# Patient Record
Sex: MALE | Race: BLACK OR AFRICAN AMERICAN | NOT HISPANIC OR LATINO | Employment: OTHER | URBAN - METROPOLITAN AREA
[De-identification: names, ages, dates, MRNs, and addresses within clinical notes are randomized per-mention and may not be internally consistent; named-entity substitution may affect disease eponyms.]

---

## 2018-10-16 PROBLEM — Z11.3 ROUTINE SCREENING FOR STI (SEXUALLY TRANSMITTED INFECTION): Status: ACTIVE | Noted: 2018-10-16

## 2018-10-16 PROBLEM — Z00.00 ANNUAL PHYSICAL EXAM: Status: ACTIVE | Noted: 2018-10-16

## 2018-10-16 PROBLEM — D72.819 LEUKOPENIA: Status: ACTIVE | Noted: 2018-10-16

## 2018-10-16 PROBLEM — E78.5 HYPERLIPIDEMIA: Status: ACTIVE | Noted: 2018-10-16

## 2019-01-21 PROBLEM — Z00.00 ANNUAL PHYSICAL EXAM: Status: RESOLVED | Noted: 2018-10-16 | Resolved: 2019-01-21

## 2019-05-23 ENCOUNTER — HOSPITAL ENCOUNTER (INPATIENT)
Facility: HOSPITAL | Age: 34
LOS: 2 days | Discharge: HOME | DRG: 282 | End: 2019-05-25
Attending: EMERGENCY MEDICINE | Admitting: HOSPITALIST
Payer: COMMERCIAL

## 2019-05-23 DIAGNOSIS — E78.5 HYPERLIPIDEMIA, UNSPECIFIED HYPERLIPIDEMIA TYPE: ICD-10-CM

## 2019-05-23 DIAGNOSIS — I21.4 NSTEMI (NON-ST ELEVATED MYOCARDIAL INFARCTION): Primary | ICD-10-CM

## 2019-05-23 DIAGNOSIS — D72.819 LEUKOPENIA, UNSPECIFIED TYPE: ICD-10-CM

## 2019-05-23 DIAGNOSIS — Z11.3 ROUTINE SCREENING FOR STI (SEXUALLY TRANSMITTED INFECTION): ICD-10-CM

## 2019-05-23 DIAGNOSIS — Z91.199 NONCOMPLIANCE: ICD-10-CM

## 2019-05-23 DIAGNOSIS — E78.5 HYPERLIPIDEMIA: ICD-10-CM

## 2019-05-23 DIAGNOSIS — R07.9 CHEST PAIN: ICD-10-CM

## 2019-05-23 LAB
ALBUMIN SERPL BCP-MCNC: 4.1 G/DL (ref 3.5–5.2)
ALP SERPL-CCNC: 58 U/L (ref 55–135)
ALT SERPL W/O P-5'-P-CCNC: 23 U/L (ref 10–44)
AMPHET+METHAMPHET UR QL: NEGATIVE
ANION GAP SERPL CALC-SCNC: 9 MMOL/L (ref 8–16)
APTT BLDCRRT: 27.8 SEC (ref 21–32)
AST SERPL-CCNC: 38 U/L (ref 10–40)
BARBITURATES UR QL SCN>200 NG/ML: NEGATIVE
BASOPHILS # BLD AUTO: 0.02 K/UL (ref 0–0.2)
BASOPHILS NFR BLD: 0.3 % (ref 0–1.9)
BENZODIAZ UR QL SCN>200 NG/ML: NEGATIVE
BILIRUB SERPL-MCNC: 0.6 MG/DL (ref 0.1–1)
BNP SERPL-MCNC: 15 PG/ML (ref 0–99)
BUN SERPL-MCNC: 16 MG/DL (ref 6–20)
BZE UR QL SCN: NEGATIVE
CALCIUM SERPL-MCNC: 10.3 MG/DL (ref 8.7–10.5)
CANNABINOIDS UR QL SCN: NEGATIVE
CHLORIDE SERPL-SCNC: 104 MMOL/L (ref 95–110)
CK SERPL-CCNC: 508 U/L (ref 20–200)
CO2 SERPL-SCNC: 28 MMOL/L (ref 23–29)
CREAT SERPL-MCNC: 1.2 MG/DL (ref 0.5–1.4)
CREAT UR-MCNC: 167.9 MG/DL (ref 23–375)
DIFFERENTIAL METHOD: ABNORMAL
EOSINOPHIL # BLD AUTO: 0.1 K/UL (ref 0–0.5)
EOSINOPHIL NFR BLD: 1.1 % (ref 0–8)
ERYTHROCYTE [DISTWIDTH] IN BLOOD BY AUTOMATED COUNT: 13.4 % (ref 11.5–14.5)
EST. GFR  (AFRICAN AMERICAN): >60 ML/MIN/1.73 M^2
EST. GFR  (NON AFRICAN AMERICAN): >60 ML/MIN/1.73 M^2
GLUCOSE SERPL-MCNC: 100 MG/DL (ref 70–110)
HCT VFR BLD AUTO: 43.1 % (ref 40–54)
HGB BLD-MCNC: 14 G/DL (ref 14–18)
INR PPP: 1 (ref 0.8–1.2)
LYMPHOCYTES # BLD AUTO: 1.3 K/UL (ref 1–4.8)
LYMPHOCYTES NFR BLD: 19.1 % (ref 18–48)
MCH RBC QN AUTO: 28.1 PG (ref 27–31)
MCHC RBC AUTO-ENTMCNC: 32.5 G/DL (ref 32–36)
MCV RBC AUTO: 86 FL (ref 82–98)
METHADONE UR QL SCN>300 NG/ML: NEGATIVE
MONOCYTES # BLD AUTO: 0.4 K/UL (ref 0.3–1)
MONOCYTES NFR BLD: 5.4 % (ref 4–15)
NEUTROPHILS # BLD AUTO: 4.9 K/UL (ref 1.8–7.7)
NEUTROPHILS NFR BLD: 73.9 % (ref 38–73)
OPIATES UR QL SCN: NEGATIVE
PCP UR QL SCN>25 NG/ML: NEGATIVE
PLATELET # BLD AUTO: 202 K/UL (ref 150–350)
PMV BLD AUTO: 10.4 FL (ref 9.2–12.9)
POTASSIUM SERPL-SCNC: 3.5 MMOL/L (ref 3.5–5.1)
PROT SERPL-MCNC: 7.6 G/DL (ref 6–8.4)
PROTHROMBIN TIME: 10.4 SEC (ref 9–12.5)
RBC # BLD AUTO: 4.99 M/UL (ref 4.6–6.2)
SODIUM SERPL-SCNC: 141 MMOL/L (ref 136–145)
TOXICOLOGY INFORMATION: NORMAL
TROPONIN I SERPL DL<=0.01 NG/ML-MCNC: 3.87 NG/ML (ref 0–0.03)
WBC # BLD AUTO: 6.66 K/UL (ref 3.9–12.7)

## 2019-05-23 PROCEDURE — 93005 ELECTROCARDIOGRAM TRACING: CPT | Mod: ER

## 2019-05-23 PROCEDURE — 99900035 HC TECH TIME PER 15 MIN (STAT): Mod: ER

## 2019-05-23 PROCEDURE — 25000003 PHARM REV CODE 250: Mod: ER | Performed by: EMERGENCY MEDICINE

## 2019-05-23 PROCEDURE — 63600175 PHARM REV CODE 636 W HCPCS: Mod: ER | Performed by: EMERGENCY MEDICINE

## 2019-05-23 PROCEDURE — 96374 THER/PROPH/DIAG INJ IV PUSH: CPT | Mod: ER

## 2019-05-23 PROCEDURE — 85025 COMPLETE CBC W/AUTO DIFF WBC: CPT | Mod: ER

## 2019-05-23 PROCEDURE — 99291 CRITICAL CARE FIRST HOUR: CPT | Mod: 25,ER

## 2019-05-23 PROCEDURE — 83880 ASSAY OF NATRIURETIC PEPTIDE: CPT | Mod: ER

## 2019-05-23 PROCEDURE — 82550 ASSAY OF CK (CPK): CPT | Mod: ER

## 2019-05-23 PROCEDURE — 85730 THROMBOPLASTIN TIME PARTIAL: CPT | Mod: ER

## 2019-05-23 PROCEDURE — 80053 COMPREHEN METABOLIC PANEL: CPT | Mod: ER

## 2019-05-23 PROCEDURE — 85610 PROTHROMBIN TIME: CPT | Mod: ER

## 2019-05-23 PROCEDURE — 21400001 HC TELEMETRY ROOM

## 2019-05-23 PROCEDURE — 80307 DRUG TEST PRSMV CHEM ANLYZR: CPT | Mod: ER

## 2019-05-23 PROCEDURE — 84484 ASSAY OF TROPONIN QUANT: CPT | Mod: ER

## 2019-05-23 PROCEDURE — 93010 ELECTROCARDIOGRAM REPORT: CPT | Mod: ,,, | Performed by: INTERNAL MEDICINE

## 2019-05-23 PROCEDURE — 93010 EKG 12-LEAD: ICD-10-PCS | Mod: ,,, | Performed by: INTERNAL MEDICINE

## 2019-05-23 RX ORDER — NITROGLYCERIN 0.4 MG/1
0.4 TABLET SUBLINGUAL
Status: COMPLETED | OUTPATIENT
Start: 2019-05-23 | End: 2019-05-23

## 2019-05-23 RX ORDER — HEPARIN SODIUM,PORCINE/D5W 25000/250
12 INTRAVENOUS SOLUTION INTRAVENOUS CONTINUOUS
Status: DISCONTINUED | OUTPATIENT
Start: 2019-05-23 | End: 2019-05-24

## 2019-05-23 RX ORDER — ATORVASTATIN CALCIUM 40 MG/1
80 TABLET, FILM COATED ORAL
Status: COMPLETED | OUTPATIENT
Start: 2019-05-23 | End: 2019-05-23

## 2019-05-23 RX ORDER — CLOPIDOGREL 300 MG/1
300 TABLET, FILM COATED ORAL
Status: COMPLETED | OUTPATIENT
Start: 2019-05-23 | End: 2019-05-23

## 2019-05-23 RX ORDER — NAPROXEN SODIUM 220 MG/1
324 TABLET, FILM COATED ORAL
Status: COMPLETED | OUTPATIENT
Start: 2019-05-23 | End: 2019-05-23

## 2019-05-23 RX ADMIN — ASPIRIN 81 MG 324 MG: 81 TABLET ORAL at 08:05

## 2019-05-23 RX ADMIN — ATORVASTATIN CALCIUM 80 MG: 40 TABLET, FILM COATED ORAL at 09:05

## 2019-05-23 RX ADMIN — CLOPIDOGREL BISULFATE 300 MG: 300 TABLET, FILM COATED ORAL at 09:05

## 2019-05-23 RX ADMIN — NITROGLYCERIN 0.4 MG: 0.4 TABLET, ORALLY DISINTEGRATING SUBLINGUAL at 09:05

## 2019-05-23 RX ADMIN — HEPARIN SODIUM AND DEXTROSE 12 UNITS/KG/HR: 10000; 5 INJECTION INTRAVENOUS at 09:05

## 2019-05-24 PROBLEM — E78.5 HYPERLIPIDEMIA: Chronic | Status: ACTIVE | Noted: 2018-10-16

## 2019-05-24 LAB
ANION GAP SERPL CALC-SCNC: 11 MMOL/L (ref 8–16)
APTT BLDCRRT: 55.5 SEC (ref 21–32)
APTT BLDCRRT: 57.1 SEC (ref 21–32)
APTT BLDCRRT: 57.3 SEC (ref 21–32)
APTT BLDCRRT: 58.7 SEC (ref 21–32)
BASOPHILS # BLD AUTO: 0.01 K/UL (ref 0–0.2)
BASOPHILS NFR BLD: 0.2 % (ref 0–1.9)
BUN SERPL-MCNC: 15 MG/DL (ref 6–20)
CALCIUM SERPL-MCNC: 9.6 MG/DL (ref 8.7–10.5)
CHLORIDE SERPL-SCNC: 107 MMOL/L (ref 95–110)
CHOLEST SERPL-MCNC: 194 MG/DL (ref 120–199)
CHOLEST/HDLC SERPL: 4.4 {RATIO} (ref 2–5)
CO2 SERPL-SCNC: 21 MMOL/L (ref 23–29)
CREAT SERPL-MCNC: 0.9 MG/DL (ref 0.5–1.4)
DIASTOLIC DYSFUNCTION: NO
DIFFERENTIAL METHOD: ABNORMAL
DIFFERENTIAL METHOD: ABNORMAL
DIFFERENTIAL METHOD: NORMAL
EOSINOPHIL # BLD AUTO: 0.1 K/UL (ref 0–0.5)
EOSINOPHIL NFR BLD: 1.3 % (ref 0–8)
EOSINOPHIL NFR BLD: 1.5 % (ref 0–8)
EOSINOPHIL NFR BLD: 1.8 % (ref 0–8)
ERYTHROCYTE [DISTWIDTH] IN BLOOD BY AUTOMATED COUNT: 13.6 % (ref 11.5–14.5)
ERYTHROCYTE [DISTWIDTH] IN BLOOD BY AUTOMATED COUNT: 13.7 % (ref 11.5–14.5)
ERYTHROCYTE [DISTWIDTH] IN BLOOD BY AUTOMATED COUNT: 13.7 % (ref 11.5–14.5)
EST. GFR  (AFRICAN AMERICAN): >60 ML/MIN/1.73 M^2
EST. GFR  (NON AFRICAN AMERICAN): >60 ML/MIN/1.73 M^2
ESTIMATED AVG GLUCOSE: 105 MG/DL (ref 68–131)
GLUCOSE SERPL-MCNC: 93 MG/DL (ref 70–110)
HBA1C MFR BLD HPLC: 5.3 % (ref 4–5.6)
HCT VFR BLD AUTO: 40.5 % (ref 40–54)
HCT VFR BLD AUTO: 41.2 % (ref 40–54)
HCT VFR BLD AUTO: 41.9 % (ref 40–54)
HDLC SERPL-MCNC: 44 MG/DL (ref 40–75)
HDLC SERPL: 22.7 % (ref 20–50)
HGB BLD-MCNC: 13.4 G/DL (ref 14–18)
HGB BLD-MCNC: 13.8 G/DL (ref 14–18)
HGB BLD-MCNC: 14 G/DL (ref 14–18)
INR PPP: 1 (ref 0.8–1.2)
LDLC SERPL CALC-MCNC: 135 MG/DL (ref 63–159)
LYMPHOCYTES # BLD AUTO: 2.1 K/UL (ref 1–4.8)
LYMPHOCYTES # BLD AUTO: 2.3 K/UL (ref 1–4.8)
LYMPHOCYTES # BLD AUTO: 2.4 K/UL (ref 1–4.8)
LYMPHOCYTES NFR BLD: 34.6 % (ref 18–48)
LYMPHOCYTES NFR BLD: 36.8 % (ref 18–48)
LYMPHOCYTES NFR BLD: 41.3 % (ref 18–48)
MCH RBC QN AUTO: 28.8 PG (ref 27–31)
MCH RBC QN AUTO: 29 PG (ref 27–31)
MCH RBC QN AUTO: 29.1 PG (ref 27–31)
MCHC RBC AUTO-ENTMCNC: 33.1 G/DL (ref 32–36)
MCHC RBC AUTO-ENTMCNC: 33.4 G/DL (ref 32–36)
MCHC RBC AUTO-ENTMCNC: 33.5 G/DL (ref 32–36)
MCV RBC AUTO: 87 FL (ref 82–98)
MITRAL VALVE MOBILITY: NORMAL
MONOCYTES # BLD AUTO: 0.3 K/UL (ref 0.3–1)
MONOCYTES # BLD AUTO: 0.4 K/UL (ref 0.3–1)
MONOCYTES # BLD AUTO: 0.4 K/UL (ref 0.3–1)
MONOCYTES NFR BLD: 5.1 % (ref 4–15)
MONOCYTES NFR BLD: 6.2 % (ref 4–15)
MONOCYTES NFR BLD: 6.4 % (ref 4–15)
NEUTROPHILS # BLD AUTO: 3 K/UL (ref 1.8–7.7)
NEUTROPHILS # BLD AUTO: 3.4 K/UL (ref 1.8–7.7)
NEUTROPHILS # BLD AUTO: 3.5 K/UL (ref 1.8–7.7)
NEUTROPHILS NFR BLD: 51.6 % (ref 38–73)
NEUTROPHILS NFR BLD: 55.3 % (ref 38–73)
NEUTROPHILS NFR BLD: 57.5 % (ref 38–73)
NONHDLC SERPL-MCNC: 150 MG/DL
PLATELET # BLD AUTO: 177 K/UL (ref 150–350)
PLATELET # BLD AUTO: 182 K/UL (ref 150–350)
PLATELET # BLD AUTO: 189 K/UL (ref 150–350)
PMV BLD AUTO: 10.2 FL (ref 9.2–12.9)
PMV BLD AUTO: 10.2 FL (ref 9.2–12.9)
PMV BLD AUTO: 9.7 FL (ref 9.2–12.9)
POTASSIUM SERPL-SCNC: 3.7 MMOL/L (ref 3.5–5.1)
PROTHROMBIN TIME: 10.9 SEC (ref 9–12.5)
RBC # BLD AUTO: 4.65 M/UL (ref 4.6–6.2)
RBC # BLD AUTO: 4.74 M/UL (ref 4.6–6.2)
RBC # BLD AUTO: 4.82 M/UL (ref 4.6–6.2)
RETIRED EF AND QEF - SEE NOTES: 55 (ref 55–65)
SODIUM SERPL-SCNC: 139 MMOL/L (ref 136–145)
TRICUSPID VALVE REGURGITATION: NORMAL
TRIGL SERPL-MCNC: 75 MG/DL (ref 30–150)
TROPONIN I SERPL DL<=0.01 NG/ML-MCNC: 2.37 NG/ML (ref 0–0.03)
TROPONIN I SERPL DL<=0.01 NG/ML-MCNC: 4.37 NG/ML (ref 0–0.03)
WBC # BLD AUTO: 5.71 K/UL (ref 3.9–12.7)
WBC # BLD AUTO: 6.15 K/UL (ref 3.9–12.7)
WBC # BLD AUTO: 6.22 K/UL (ref 3.9–12.7)

## 2019-05-24 PROCEDURE — 99152 MOD SED SAME PHYS/QHP 5/>YRS: CPT

## 2019-05-24 PROCEDURE — 25000003 PHARM REV CODE 250: Performed by: INTERNAL MEDICINE

## 2019-05-24 PROCEDURE — 63600175 PHARM REV CODE 636 W HCPCS: Performed by: NURSE PRACTITIONER

## 2019-05-24 PROCEDURE — 83036 HEMOGLOBIN GLYCOSYLATED A1C: CPT

## 2019-05-24 PROCEDURE — 99233 SBSQ HOSP IP/OBS HIGH 50: CPT | Mod: 25,,, | Performed by: INTERNAL MEDICINE

## 2019-05-24 PROCEDURE — 80061 LIPID PANEL: CPT

## 2019-05-24 PROCEDURE — 93005 ELECTROCARDIOGRAM TRACING: CPT

## 2019-05-24 PROCEDURE — 94761 N-INVAS EAR/PLS OXIMETRY MLT: CPT

## 2019-05-24 PROCEDURE — 85730 THROMBOPLASTIN TIME PARTIAL: CPT | Mod: 91

## 2019-05-24 PROCEDURE — 99152 CATH LAB PROCEDURE: ICD-10-PCS | Mod: ,,, | Performed by: INTERNAL MEDICINE

## 2019-05-24 PROCEDURE — C1769 GUIDE WIRE: HCPCS

## 2019-05-24 PROCEDURE — 93041 RHYTHM ECG TRACING: CPT

## 2019-05-24 PROCEDURE — 80048 BASIC METABOLIC PNL TOTAL CA: CPT

## 2019-05-24 PROCEDURE — 25000003 PHARM REV CODE 250: Performed by: NURSE PRACTITIONER

## 2019-05-24 PROCEDURE — 85025 COMPLETE CBC W/AUTO DIFF WBC: CPT | Mod: 91

## 2019-05-24 PROCEDURE — 93306 TTE W/DOPPLER COMPLETE: CPT | Mod: 26,,, | Performed by: INTERNAL MEDICINE

## 2019-05-24 PROCEDURE — 21400001 HC TELEMETRY ROOM

## 2019-05-24 PROCEDURE — 63600175 PHARM REV CODE 636 W HCPCS

## 2019-05-24 PROCEDURE — 99152 MOD SED SAME PHYS/QHP 5/>YRS: CPT | Mod: ,,, | Performed by: INTERNAL MEDICINE

## 2019-05-24 PROCEDURE — 85610 PROTHROMBIN TIME: CPT

## 2019-05-24 PROCEDURE — 99233 PR SUBSEQUENT HOSPITAL CARE,LEVL III: ICD-10-PCS | Mod: 25,,, | Performed by: INTERNAL MEDICINE

## 2019-05-24 PROCEDURE — 93010 ELECTROCARDIOGRAM REPORT: CPT | Mod: ,,, | Performed by: INTERNAL MEDICINE

## 2019-05-24 PROCEDURE — 93458 L HRT ARTERY/VENTRICLE ANGIO: CPT | Mod: 26,,, | Performed by: INTERNAL MEDICINE

## 2019-05-24 PROCEDURE — 93306 2D ECHO WITH COLOR FLOW DOPPLER: ICD-10-PCS | Mod: 26,,, | Performed by: INTERNAL MEDICINE

## 2019-05-24 PROCEDURE — 93458 CATH LAB PROCEDURE: ICD-10-PCS | Mod: 26,,, | Performed by: INTERNAL MEDICINE

## 2019-05-24 PROCEDURE — 93306 TTE W/DOPPLER COMPLETE: CPT

## 2019-05-24 PROCEDURE — 93010 EKG 12-LEAD: ICD-10-PCS | Mod: ,,, | Performed by: INTERNAL MEDICINE

## 2019-05-24 PROCEDURE — 84484 ASSAY OF TROPONIN QUANT: CPT | Mod: 91

## 2019-05-24 PROCEDURE — 25000003 PHARM REV CODE 250

## 2019-05-24 PROCEDURE — 36415 COLL VENOUS BLD VENIPUNCTURE: CPT

## 2019-05-24 RX ORDER — OXYCODONE HYDROCHLORIDE 5 MG/1
10 TABLET ORAL EVERY 4 HOURS PRN
Status: DISCONTINUED | OUTPATIENT
Start: 2019-05-24 | End: 2019-05-25 | Stop reason: HOSPADM

## 2019-05-24 RX ORDER — HEPARIN SODIUM 10000 [USP'U]/100ML
12 INJECTION, SOLUTION INTRAVENOUS CONTINUOUS
Status: DISCONTINUED | OUTPATIENT
Start: 2019-05-24 | End: 2019-05-24

## 2019-05-24 RX ORDER — HEPARIN SODIUM,PORCINE/D5W 25000/250
12 INTRAVENOUS SOLUTION INTRAVENOUS CONTINUOUS
Status: DISCONTINUED | OUTPATIENT
Start: 2019-05-24 | End: 2019-05-24

## 2019-05-24 RX ORDER — METOPROLOL TARTRATE 25 MG/1
25 TABLET, FILM COATED ORAL 2 TIMES DAILY
Status: DISCONTINUED | OUTPATIENT
Start: 2019-05-24 | End: 2019-05-25 | Stop reason: HOSPADM

## 2019-05-24 RX ORDER — NITROGLYCERIN 0.4 MG/1
0.4 TABLET SUBLINGUAL EVERY 5 MIN PRN
Status: DISCONTINUED | OUTPATIENT
Start: 2019-05-24 | End: 2019-05-24

## 2019-05-24 RX ORDER — SODIUM CHLORIDE 9 MG/ML
INJECTION, SOLUTION INTRAVENOUS CONTINUOUS
Status: DISCONTINUED | OUTPATIENT
Start: 2019-05-24 | End: 2019-05-24

## 2019-05-24 RX ORDER — ATORVASTATIN CALCIUM 40 MG/1
40 TABLET, FILM COATED ORAL NIGHTLY
Status: DISCONTINUED | OUTPATIENT
Start: 2019-05-24 | End: 2019-05-25 | Stop reason: HOSPADM

## 2019-05-24 RX ORDER — ASPIRIN 81 MG/1
81 TABLET ORAL DAILY
Status: DISCONTINUED | OUTPATIENT
Start: 2019-05-24 | End: 2019-05-25 | Stop reason: HOSPADM

## 2019-05-24 RX ORDER — ATROPINE SULFATE 0.1 MG/ML
0.5 INJECTION INTRAVENOUS
Status: DISCONTINUED | OUTPATIENT
Start: 2019-05-24 | End: 2019-05-25 | Stop reason: HOSPADM

## 2019-05-24 RX ORDER — RAMELTEON 8 MG/1
8 TABLET ORAL NIGHTLY PRN
Status: DISCONTINUED | OUTPATIENT
Start: 2019-05-24 | End: 2019-05-25 | Stop reason: HOSPADM

## 2019-05-24 RX ORDER — SODIUM CHLORIDE 0.9 % (FLUSH) 0.9 %
10 SYRINGE (ML) INJECTION
Status: DISCONTINUED | OUTPATIENT
Start: 2019-05-24 | End: 2019-05-25 | Stop reason: HOSPADM

## 2019-05-24 RX ORDER — NITROGLYCERIN 0.4 MG/1
0.4 TABLET SUBLINGUAL EVERY 5 MIN PRN
Status: DISCONTINUED | OUTPATIENT
Start: 2019-05-24 | End: 2019-05-25 | Stop reason: HOSPADM

## 2019-05-24 RX ORDER — HYDROCODONE BITARTRATE AND ACETAMINOPHEN 5; 325 MG/1; MG/1
1 TABLET ORAL EVERY 4 HOURS PRN
Status: DISCONTINUED | OUTPATIENT
Start: 2019-05-24 | End: 2019-05-25 | Stop reason: HOSPADM

## 2019-05-24 RX ORDER — ACETAMINOPHEN 325 MG/1
650 TABLET ORAL EVERY 6 HOURS PRN
Status: DISCONTINUED | OUTPATIENT
Start: 2019-05-24 | End: 2019-05-24

## 2019-05-24 RX ORDER — SODIUM CHLORIDE 9 MG/ML
INJECTION, SOLUTION INTRAVENOUS CONTINUOUS
Status: ACTIVE | OUTPATIENT
Start: 2019-05-24 | End: 2019-05-24

## 2019-05-24 RX ORDER — ISOSORBIDE MONONITRATE 60 MG/1
60 TABLET, EXTENDED RELEASE ORAL DAILY
Status: DISCONTINUED | OUTPATIENT
Start: 2019-05-24 | End: 2019-05-25

## 2019-05-24 RX ORDER — PANTOPRAZOLE SODIUM 40 MG/1
40 TABLET, DELAYED RELEASE ORAL DAILY
Status: DISCONTINUED | OUTPATIENT
Start: 2019-05-24 | End: 2019-05-25 | Stop reason: HOSPADM

## 2019-05-24 RX ORDER — ONDANSETRON 2 MG/ML
4 INJECTION INTRAMUSCULAR; INTRAVENOUS EVERY 6 HOURS PRN
Status: DISCONTINUED | OUTPATIENT
Start: 2019-05-24 | End: 2019-05-25 | Stop reason: HOSPADM

## 2019-05-24 RX ORDER — ACETAMINOPHEN 325 MG/1
650 TABLET ORAL EVERY 4 HOURS PRN
Status: DISCONTINUED | OUTPATIENT
Start: 2019-05-24 | End: 2019-05-25 | Stop reason: HOSPADM

## 2019-05-24 RX ORDER — ATORVASTATIN CALCIUM 40 MG/1
40 TABLET, FILM COATED ORAL DAILY
Status: DISCONTINUED | OUTPATIENT
Start: 2019-05-24 | End: 2019-05-24

## 2019-05-24 RX ADMIN — METOPROLOL TARTRATE 25 MG: 25 TABLET ORAL at 08:05

## 2019-05-24 RX ADMIN — SODIUM CHLORIDE: 0.9 INJECTION, SOLUTION INTRAVENOUS at 10:05

## 2019-05-24 RX ADMIN — METOPROLOL TARTRATE 25 MG: 25 TABLET ORAL at 09:05

## 2019-05-24 RX ADMIN — ASPIRIN 81 MG: 81 TABLET, COATED ORAL at 09:05

## 2019-05-24 RX ADMIN — ISOSORBIDE MONONITRATE 60 MG: 60 TABLET, EXTENDED RELEASE ORAL at 09:05

## 2019-05-24 RX ADMIN — NITROGLYCERIN 0.4 MG: 0.4 TABLET, ORALLY DISINTEGRATING SUBLINGUAL at 09:05

## 2019-05-24 RX ADMIN — HYDROCODONE BITARTRATE AND ACETAMINOPHEN 1 TABLET: 5; 325 TABLET ORAL at 03:05

## 2019-05-24 RX ADMIN — ATORVASTATIN CALCIUM 40 MG: 40 TABLET, FILM COATED ORAL at 08:05

## 2019-05-24 RX ADMIN — PANTOPRAZOLE SODIUM 40 MG: 40 TABLET, DELAYED RELEASE ORAL at 09:05

## 2019-05-24 NOTE — BRIEF OP NOTE
<[facility]  Surgery Department  Operative Note    SUMMARY     Date of Procedure: 5/24/2019     Procedure: Procedure(s) (LRB):  CATHETERIZATION, HEART, LEFT (Left)     Surgeon(s) and Role:     * Shaheen Rich MD - Primary    Assisting Surgeon: None    Pre-Operative Diagnosis: NSTEMI (non-ST elevated myocardial infarction) [I21.4]    Post-Operative Diagnosis: Post-Op Diagnosis Codes:     * NSTEMI (non-ST elevated myocardial infarction) [I21.4]    Anesthesia: RN IV Sedation    Technical Procedures Used: [facility]    Description of the Findings of the Procedure: [facility], DX: NSTEMI, FINDINGS: NORMAL CORONARIES, NML LV FUNCTION, POSSIBLE DEMAND ISCHEMIA, DBP 94 ON ADMIT    Significant Surgical Tasks Conducted by the Assistant(s), if Applicable: NIONE    Complications: No    Estimated Blood Loss (EBL): < 50 cc           Implants: * No implants in log *    Specimens:   Specimen (12h ago, onward)    None                  Condition: Good    Disposition: PACU - hemodynamically stable.    Attestation: I was present and scrubbed for the entire procedure.

## 2019-05-24 NOTE — PROGRESS NOTES
[facility] Hospital Medicine  Progress Note    Patient Name: Oumar Mccarthy  MRN: [id number]  Patient Class: IP- Inpatient   Admission Date: 5/23/2019  Length of Stay: 1 days  Attending Physician: Andrew Cox MD  Primary Care Provider: Leana Troy MD        Subjective:     Principal Problem:NSTEMI (non-ST elevated myocardial infarction)    HPI:  Mr. Mccarthy is a 32yo male with a PMHx of HLD, who presented to the ED with c/o substernal chest pain x 1-2 weeks.  Pain had been intermittent until this morning when it became constant.  Pain is pressure-like in nature, moderate in intensity, radiates into left arm, and nonexertional.  No aggravating or alleviating factors.  No associated symptoms.  Denies any SOB/ALEXIS, diaphoresis, N/V, palpitations, cough, edema, ABD pain, back pain, lightheadedness, dizziness, syncope, fever or chills.  Patient admits to noncompliance with statin and diet.  He denies any known family h/o premature CAD.  No past or current tobacco use.  He received 325mg ASA and SL NTG in ED with relief of chest pain.  Initial work-up resulted troponin 3.867.  EKG revealed NSR with T wave inversions in inferior leads.  Case discussed with Cardiology in ED, who recommend Plavix load, high-intensity statin, and UFH.  Hospital Medicine was called for admission.  Currently, patient appears comfortable in NAD.  Denies any chest pain at present.      Hospital Course:  5/24  Patient admitted to hospital with a NSTEMI, placed on heparin Gtt, initiated on asa, plavix, statin, nitro therapy. Cardiology placed on consult and consideration for [facility] today in progress. Patient Troponins peaking at 4.366 with a trend downward now at 2.365. Patient with complaint of CP at present described as a dull ache with improvement in Left Arm numbness as compared to admission. EKG ordered and reviewed. Discussed with Cardiology possible tridil Gtt and ICU transfer - at present we will monitor on  telemetry and maintain a high level of vigilance.    Interval History: NSTEMI. Cards evaluating for LHC. Patient NPO. EKG reviewed    Review of Systems   Constitutional: Negative.  Negative for appetite change, fatigue and fever.   HENT: Negative.  Negative for congestion, sinus pressure and sore throat.    Eyes: Negative.  Negative for photophobia, discharge and visual disturbance.   Respiratory: Negative.  Negative for cough, chest tightness, shortness of breath and wheezing.    Cardiovascular: Positive for chest pain. Negative for palpitations.   Gastrointestinal: Negative.  Negative for abdominal pain, blood in stool, constipation, diarrhea, nausea and vomiting.   Endocrine: Negative.    Genitourinary: Negative.    Musculoskeletal: Negative.  Negative for myalgias, neck pain and neck stiffness.   Skin: Negative.    Allergic/Immunologic: Negative.    Neurological: Negative.  Negative for seizures, syncope, weakness and headaches.   Hematological: Negative.    Psychiatric/Behavioral: Negative.  Negative for agitation, behavioral problems, hallucinations, self-injury and suicidal ideas.     Objective:     Vital Signs (Most Recent):  Temp: 98.8 °F (37.1 °C) (05/24/19 0754)  Pulse: 80 (05/24/19 0921)  Resp: 18 (05/24/19 0754)  BP: 130/79 (05/24/19 0921)  SpO2: 96 % (05/24/19 0921) Vital Signs (24h Range):  Temp:  [96.5 °F (35.8 °C)-98.8 °F (37.1 °C)] 98.8 °F (37.1 °C)  Pulse:  [58-83] 80  Resp:  [14-24] 18  SpO2:  [96 %-100 %] 96 %  BP: (121-157)/(67-94) 130/79     Weight: 98 kg (216 lb 0.8 oz)  Body mass index is 27.74 kg/m².  No intake or output data in the 24 hours ending 05/24/19 0947   Physical Exam   Constitutional: He is oriented to person, place, and time. He appears well-developed and well-nourished.   HENT:   Head: Normocephalic and atraumatic.   Eyes: Pupils are equal, round, and reactive to light. EOM are normal.   Neck: Normal range of motion. Neck supple.   Cardiovascular: Normal rate, regular rhythm,  normal heart sounds and intact distal pulses.   Pulmonary/Chest: Effort normal and breath sounds normal. No respiratory distress. He has no wheezes.   Abdominal: Soft. Bowel sounds are normal.   Musculoskeletal: Normal range of motion. He exhibits no deformity.   Neurological: He is alert and oriented to person, place, and time. He has normal reflexes.   Skin: Skin is warm and dry. Capillary refill takes less than 2 seconds.   Psychiatric: He has a normal mood and affect. His behavior is normal. Judgment and thought content normal.   Nursing note and vitals reviewed.      Significant Labs:   BMP:   Recent Labs   Lab 05/24/19  0235   GLU 93      K 3.7      CO2 21*   BUN 15   CREATININE 0.9   CALCIUM 9.6     CBC:   Recent Labs   Lab 05/24/19 0235 05/24/19 0357 05/24/19  0421   WBC 6.15 6.22 5.71   HGB 13.8* 14.0 13.4*   HCT 41.2 41.9 40.5    182 189     CMP:   Recent Labs   Lab 05/23/19 2015 05/24/19  0235    139   K 3.5 3.7    107   CO2 28 21*    93   BUN 16 15   CREATININE 1.2 0.9   CALCIUM 10.3 9.6   PROT 7.6  --    ALBUMIN 4.1  --    BILITOT 0.6  --    ALKPHOS 58  --    AST 38  --    ALT 23  --    ANIONGAP 9 11   EGFRNONAA >60.0 >60     All pertinent labs within the past 24 hours have been reviewed.    Significant Imaging: I have reviewed all pertinent imaging results/findings within the past 24 hours.    Assessment/Plan:      * NSTEMI (non-ST elevated myocardial infarction)  - Initial troponin 3.867, EKG with inferior T wave inversions.  Chest pain free at present.  - Trend serial cardiac enzymes and EKG.  - Check FLP.  - Will obtain 2D echo.  - ASA, beta blocker, and statin.  - Continue heparin drip per ACS protocol.   - Cardiology consult.  Keep NPO after MN for probable LHC.    5/24  ASA  Statin  Plavix  BB  Cards  NPO  LHC possible  Heparin Gtt    Hyperlipidemia  - Increase home atorvastatin to 40mg daily.  - Check FLP.    5/24  Statin  Cards  Monitor      VTE Risk  Mitigation (From admission, onward)        Ordered     heparin 25,000 units in dextrose 5% (100 units/ml) IV bolus from bag INITIAL BOLUS (max bolus 4000 units)  Once      05/24/19 0345     heparin 25,000 units in dextrose 5% 250 mL (100 units/mL) infusion LOW INTENSITY nomogram - [facility]  Continuous      05/24/19 0345     heparin 25,000 units in dextrose 5% (100 units/ml) IV bolus from bag - ADDITIONAL PRN BOLUS - 60 units/kg (max bolus 4000 units)  As needed (PRN)      05/24/19 0345     heparin 25,000 units in dextrose 5% (100 units/ml) IV bolus from bag - ADDITIONAL PRN BOLUS - 30 units/kg (max bolus 4000 units)  As needed (PRN)      05/24/19 0345     IP VTE HIGH RISK PATIENT  Once      05/24/19 0159     Place sequential compression device  Until discontinued      05/24/19 0159     Reason for No Pharmacological VTE Prophylaxis  Once      05/24/19 0159              Andrew Cox MD  Department of Hospital Medicine   [facility]

## 2019-05-24 NOTE — SUBJECTIVE & OBJECTIVE
Past Medical History:   Diagnosis Date    Adult general medical examination 11/28/2016    Hyperlipidemia     Leukopenia        History reviewed. No pertinent surgical history.    Review of patient's allergies indicates:  No Known Allergies    No current facility-administered medications on file prior to encounter.      Current Outpatient Medications on File Prior to Encounter   Medication Sig    atorvastatin (LIPITOR) 20 MG tablet Take 1 tablet (20 mg total) by mouth once daily.    [DISCONTINUED] atorvastatin (LIPITOR) 10 MG tablet Take 1 tablet (10 mg total) by mouth every evening.     Family History     Problem Relation (Age of Onset)    Heart disease Paternal Grandfather    Hyperlipidemia Paternal Grandfather        Tobacco Use    Smoking status: Never Smoker    Smokeless tobacco: Never Used   Substance and Sexual Activity    Alcohol use: Yes     Comment: social    Drug use: No    Sexual activity: Yes     Partners: Female     Review of Systems   Constitution: Negative for diaphoresis, malaise/fatigue, weight gain and weight loss.   HENT: Negative for congestion and nosebleeds.    Cardiovascular: Positive for chest pain. Negative for claudication, cyanosis, dyspnea on exertion, irregular heartbeat, leg swelling, near-syncope, orthopnea, palpitations, paroxysmal nocturnal dyspnea and syncope.   Respiratory: Negative for cough, hemoptysis, shortness of breath, sleep disturbances due to breathing, snoring, sputum production and wheezing.    Hematologic/Lymphatic: Negative for bleeding problem. Does not bruise/bleed easily.   Skin: Negative for rash.   Musculoskeletal: Negative for arthritis, back pain, falls, joint pain, muscle cramps and muscle weakness.   Gastrointestinal: Negative for abdominal pain, constipation, diarrhea, heartburn, hematemesis, hematochezia, melena and nausea.   Genitourinary: Negative for dysuria, hematuria and nocturia.   Neurological: Negative for excessive daytime sleepiness,  dizziness, headaches, light-headedness, loss of balance, numbness, vertigo and weakness.     Objective:     Vital Signs (Most Recent):  Temp: 98.8 °F (37.1 °C) (05/24/19 0754)  Pulse: 80 (05/24/19 0921)  Resp: 18 (05/24/19 0754)  BP: 130/79 (05/24/19 0921)  SpO2: 96 % (05/24/19 0921) Vital Signs (24h Range):  Temp:  [96.5 °F (35.8 °C)-98.8 °F (37.1 °C)] 98.8 °F (37.1 °C)  Pulse:  [58-83] 80  Resp:  [14-24] 18  SpO2:  [96 %-100 %] 96 %  BP: (121-157)/(67-94) 130/79     Weight: 98 kg (216 lb 0.8 oz)  Body mass index is 27.74 kg/m².    SpO2: 96 %  O2 Device (Oxygen Therapy): room air    No intake or output data in the 24 hours ending 05/24/19 1025    Lines/Drains/Airways     Peripheral Intravenous Line                 Peripheral IV - Single Lumen 05/23/19 2010 18 G Right Antecubital less than 1 day         Peripheral IV - Single Lumen 05/23/19 2110 20 G Left Antecubital less than 1 day                Physical Exam   Constitutional: He is oriented to person, place, and time. He appears well-developed and well-nourished.   Neck: Neck supple. No JVD present.   Cardiovascular: Normal rate, regular rhythm, normal heart sounds and normal pulses. Exam reveals no friction rub.   No murmur heard.  Pulmonary/Chest: Effort normal and breath sounds normal. No respiratory distress. He has no wheezes. He has no rales.   Abdominal: Soft. Bowel sounds are normal. He exhibits no distension.   Musculoskeletal: He exhibits no edema or tenderness.   Neurological: He is alert and oriented to person, place, and time.   Skin: Skin is warm and dry. No rash noted.   Psychiatric: He has a normal mood and affect. His behavior is normal.   Nursing note and vitals reviewed.      Significant Labs:   All pertinent lab results from the last 24 hours have been reviewed. and

## 2019-05-24 NOTE — HPI
Mr. Mccarthy is a 32yo male with a PMHx of HLD, who presented to the ED with c/o substernal chest pain x 1-2 weeks. Pain had been intermittent until this morning when it became constant.  Pain is pressure-like in nature, moderate in intensity, radiates into left arm, and nonexertional.  No aggravating or alleviating factors.  No associated symptoms. Denies any SOB/ALEXIS, diaphoresis, N/V, palpitations, cough, edema, ABD pain, back pain, lightheadedness, dizziness, syncope, fever or chills.  Patient admits to noncompliance with statin and diet.  He denies any known family h/o premature CAD.  No past or current tobacco use.  He received 325mg ASA and SL NTG in ED with relief of chest pain. Initial work-up resulted troponin 3.867, 4.366, 2.365  .  EKG revealed NSR with T wave inversions in inferior leads. Given Plavix load, high-intensity statin, and has been started on Heparin gtt. ED echo pending. Currently CP free

## 2019-05-24 NOTE — HPI
Mr. Mccarthy is a 34yo male with a PMHx of HLD, who presented to the ED with c/o substernal chest pain x 1-2 weeks.  Pain had been intermittent until this morning when it became constant.  Pain is pressure-like in nature, moderate in intensity, radiates into left arm, and nonexertional.  No aggravating or alleviating factors.  No associated symptoms.  Denies any SOB/ALEXIS, diaphoresis, N/V, palpitations, cough, edema, ABD pain, back pain, lightheadedness, dizziness, syncope, fever or chills.  Patient admits to noncompliance with statin and diet.  He denies any known family h/o premature CAD.  No past or current tobacco use.  He received 325mg ASA and SL NTG in ED with relief of chest pain.  Initial work-up resulted troponin 3.867.  EKG revealed NSR with T wave inversions in inferior leads.  Case discussed with Cardiology in ED, who recommend Plavix load, high-intensity statin, and UFH.  Hospital Medicine was called for admission.  Currently, patient appears comfortable in NAD.  Denies any chest pain at present.

## 2019-05-24 NOTE — ED PROVIDER NOTES
Encounter Date: 5/23/2019       History     Chief Complaint   Patient presents with    Chest Pain     Chest pain and left arm numbness seen at [facility] for same last pm worse today onset 1-2 weeks     Patient currently presents with chief complaint of chest pain.  This began about 1 - 2 weeks ago but has been intermittent in nature.  This current pain has been sustained since this AM.  This is localized to the parasternal region.  Patient denies shortness of breath, denies palpitations,  denies nausea, denies diaphoresis.  Radiation of pain:  left arm.  Patient denies aspirin use in the last 24 hours. Patient denies history of known CAD.  Cardiac risk factors include:  Hyperlipidemia.  Notes he is noncompliant with meds.        Review of patient's allergies indicates:  No Known Allergies  Past Medical History:   Diagnosis Date    Adult general medical examination 11/28/2016    Hyperlipidemia     Leukopenia      History reviewed. No pertinent surgical history.  Family History   Problem Relation Age of Onset    Heart disease Paternal Grandfather     Hyperlipidemia Paternal Grandfather      Social History     Tobacco Use    Smoking status: Never Smoker    Smokeless tobacco: Never Used   Substance Use Topics    Alcohol use: Yes     Comment: social    Drug use: No     Review of Systems   Constitutional: Negative for chills and fever.   HENT: Negative for congestion.    Respiratory: Negative for chest tightness and shortness of breath.    Cardiovascular: Positive for chest pain. Negative for leg swelling.   Gastrointestinal: Negative for abdominal pain, constipation, diarrhea, nausea and vomiting.   Genitourinary: Negative for dysuria, frequency and urgency.   Skin: Negative for color change and rash.   Allergic/Immunologic: Negative for immunocompromised state.   Neurological: Negative for weakness and numbness.   Hematological: Negative for adenopathy. Does not bruise/bleed easily.   All other systems reviewed and  "are negative.      Physical Exam     Initial Vitals [05/23/19 1954]   BP Pulse Resp Temp SpO2   (!) 156/94 72 20 98.6 °F (37 °C) 100 %      MAP       --         Vitals:    05/23/19 1954 05/23/19 2004 05/23/19 2005 05/23/19 2017   BP: (!) 156/94  (!) 157/89 (!) 143/88   Pulse: 72 78 68 68   Resp: 20  (!) 22 20   Temp: 98.6 °F (37 °C)      TempSrc: Oral      SpO2: 100%  100% 100%   Weight: 99.8 kg (220 lb 0.3 oz)      Height: 6' 2" (1.88 m)       05/23/19 2034 05/23/19 2100 05/23/19 2101 05/23/19 2131   BP: (!) 143/88  (!) 145/86 (!) 145/80   Pulse: 73 70 67 67   Resp:   (!) 24 (!) 24   Temp:       TempSrc:       SpO2: 98%  100% 100%   Weight:       Height:        05/23/19 2200   BP: 127/74   Pulse: 67   Resp: (!) 23   Temp:    TempSrc:    SpO2: 99%   Weight:    Height:        Physical Exam    Nursing note and vitals reviewed.  Constitutional: He appears well-developed and well-nourished. He is not diaphoretic. No distress.   HENT:   Head: Normocephalic and atraumatic.   Right Ear: External ear normal.   Left Ear: External ear normal.   Nose: Nose normal.   Mouth/Throat: Oropharynx is clear and moist.   Eyes: Conjunctivae and EOM are normal. Pupils are equal, round, and reactive to light. No scleral icterus.   Neck: Neck supple. No JVD present.   Cardiovascular: Normal rate, regular rhythm, normal heart sounds and intact distal pulses. Exam reveals no gallop and no friction rub.    No murmur heard.  Pulmonary/Chest: Breath sounds normal. No respiratory distress. He has no wheezes. He has no rhonchi. He has no rales.   Abdominal: Soft. Bowel sounds are normal. He exhibits no distension. There is no tenderness.   Musculoskeletal: Normal range of motion. He exhibits no edema.   Neurological: He is alert and oriented to person, place, and time. He has normal strength. No cranial nerve deficit or sensory deficit.   Skin: Skin is warm and dry. No rash noted.   Psychiatric: He has a normal mood and affect. His behavior is " normal.         ED Course   Critical Care  Date/Time: 5/23/2019 9:22 PM  Performed by: Ivan Joseph MD  Authorized by: Ivan Joseph MD   Direct patient critical care time: 18 minutes  Ordering / reviewing critical care time: 8 minutes  Documentation critical care time: 8 minutes  Consulting other physicians critical care time: 6 minutes  Total critical care time (exclusive of procedural time) : 40 minutes  Critical care time was exclusive of separately billable procedures and treating other patients.  Critical care was necessary to treat or prevent imminent or life-threatening deterioration of the following conditions: circulatory failure.  Critical care was time spent personally by me on the following activities: pulse oximetry, re-evaluation of patient's condition, ordering and review of laboratory studies, ordering and performing treatments and interventions, examination of patient, obtaining history from patient or surrogate, evaluation of patient's response to treatment, discussions with consultants and development of treatment plan with patient or surrogate.        Labs Reviewed   CBC W/ AUTO DIFFERENTIAL - Abnormal; Notable for the following components:       Result Value    Gran% 73.9 (*)     All other components within normal limits   TROPONIN I - Abnormal; Notable for the following components:    Troponin I 3.867 (*)     All other components within normal limits   B-TYPE NATRIURETIC PEPTIDE   COMPREHENSIVE METABOLIC PANEL   APTT   PROTIME-INR   PROTIME-INR   APTT   APTT   APTT     EKG Readings: (Independently Interpreted)   Rhythm: Normal Sinus Rhythm. Heart Rate: 67. Ectopy: No Ectopy. Conduction: Normal. T Waves Flipped: II, III and AVF.       Imaging Results          X-Ray Chest AP Portable (Final result)  Result time 05/23/19 20:39:14    Final result by Flip Courtney MD (05/23/19 20:39:14)                 Impression:      No acute findings.      Electronically signed by: Flip  MD Roz  Date:    05/23/2019  Time:    20:39             Narrative:    EXAMINATION:  XR CHEST AP PORTABLE    CLINICAL HISTORY:  Chest Pain;    COMPARISON:  None    FINDINGS:  Lungs are clear.  Heart size within normal limits.No significant bony findings.                                 Medical Decision Making:   ED Management:  Patient discussed in detail with cardiology on-call Dr. Bojorquez who agrees with our plan and management noting he will see the patient in the a.m. and likely plan for catheterization.    All historical, clinical, radiographic, and laboratory findings were reviewed with the patient/family in detail along with the indications for transport to the facility in [place] in order to receive cardiac monitoring, heparin infusion, and cardiology consultation.  All remaining questions and concerns were addressed at this time and the patient/family communicates understanding and agrees to proceed accordingly.  Similarly, all pertinent details of the encounter were discussed with Dr. Gerber who agrees to receive the patient at [facility] for further care as outlined above.  The patient will be transferred by [facility] services secondary to a need for ongoing cardiac monitoring en route.  Ivan Joseph MD  9:20 PM                          Clinical Impression:       ICD-10-CM ICD-9-CM   1. NSTEMI (non-ST elevated myocardial infarction) I21.4 410.70   2. Chest pain R07.9 786.50   3. Hyperlipidemia, unspecified hyperlipidemia type E78.5 272.4   4. Noncompliance Z91.19 V15.81                                Ivan Joseph MD  05/23/19 230

## 2019-05-24 NOTE — H&P
[facility] Hospital Medicine  History & Physical    Patient Name: Oumar Mccarthy  MRN: [id number]  Admission Date: 5/23/2019  Attending Physician: Chavez Gerber MD   Primary Care Provider: Leana Troy MD         Patient information was obtained from patient and ER records.     Subjective:     Principal Problem:NSTEMI (non-ST elevated myocardial infarction)    Chief Complaint:   Chief Complaint   Patient presents with    Chest Pain     Chest pain and left arm numbness seen at [facility] for same last pm worse today onset 1-2 weeks        HPI: Mr. Mccarthy is a 34yo male with a PMHx of HLD, who presented to the ED with c/o substernal chest pain x 1-2 weeks.  Pain had been intermittent until this morning when it became constant.  Pain is pressure-like in nature, moderate in intensity, radiates into left arm, and nonexertional.  No aggravating or alleviating factors.  No associated symptoms.  Denies any SOB/ALEXIS, diaphoresis, N/V, palpitations, cough, edema, ABD pain, back pain, lightheadedness, dizziness, syncope, fever or chills.  Patient admits to noncompliance with statin and diet.  He denies any known family h/o premature CAD.  No past or current tobacco use.  He received 325mg ASA and SL NTG in ED with relief of chest pain.  Initial work-up resulted troponin 3.867.  EKG revealed NSR with T wave inversions in inferior leads.  Case discussed with Cardiology in ED, who recommend Plavix load, high-intensity statin, and UFH.  Hospital Medicine was called for admission.  Currently, patient appears comfortable in NAD.  Denies any chest pain at present.      Past Medical History:   Diagnosis Date    Adult general medical examination 11/28/2016    Hyperlipidemia     Leukopenia        History reviewed. No pertinent surgical history.    Review of patient's allergies indicates:  No Known Allergies    No current facility-administered medications on file prior to encounter.      Current Outpatient  Medications on File Prior to Encounter   Medication Sig    atorvastatin (LIPITOR) 10 MG tablet Take 1 tablet (10 mg total) by mouth every evening.    atorvastatin (LIPITOR) 20 MG tablet Take 1 tablet (20 mg total) by mouth once daily.     Family History     Problem Relation (Age of Onset)    Heart disease Paternal Grandfather    Hyperlipidemia Paternal Grandfather        Tobacco Use    Smoking status: Never Smoker    Smokeless tobacco: Never Used   Substance and Sexual Activity    Alcohol use: Yes     Comment: social    Drug use: No    Sexual activity: Yes     Partners: Female     Review of Systems   Constitutional: Negative for activity change, chills, diaphoresis, fatigue, fever and unexpected weight change.   HENT: Negative for congestion and sore throat.    Respiratory: Negative for cough, shortness of breath and wheezing.    Cardiovascular: Positive for chest pain. Negative for palpitations and leg swelling.   Gastrointestinal: Negative for abdominal distention, abdominal pain, blood in stool, constipation, diarrhea, nausea and vomiting.   Genitourinary: Negative for dysuria and hematuria.   Musculoskeletal: Negative for arthralgias, back pain and myalgias.   Skin: Negative for pallor, rash and wound.   Neurological: Negative for dizziness, syncope, weakness, light-headedness, numbness and headaches.   Psychiatric/Behavioral: Negative for confusion. The patient is not nervous/anxious.    All other systems reviewed and are negative.    Objective:     Vital Signs (Most Recent):  Temp: 96.5 °F (35.8 °C) (05/24/19 0354)  Pulse: 66 (05/24/19 0354)  Resp: 19 (05/24/19 0354)  BP: 123/80 (05/24/19 0354)  SpO2: 100 % (05/24/19 0354) Vital Signs (24h Range):  Temp:  [96.5 °F (35.8 °C)-98.6 °F (37 °C)] 96.5 °F (35.8 °C)  Pulse:  [58-83] 66  Resp:  [14-24] 19  SpO2:  [98 %-100 %] 100 %  BP: (121-157)/(67-94) 123/80     Weight: 99.8 kg (220 lb 0.3 oz)  Body mass index is 28.25 kg/m².    Physical Exam    Constitutional: He is oriented to person, place, and time. He appears well-developed and well-nourished. No distress.   HENT:   Head: Normocephalic and atraumatic.   Eyes: Conjunctivae are normal.   PERRL; EOM intact.   Neck: Normal range of motion. Neck supple. No JVD present.   Cardiovascular: Normal rate, regular rhythm, S1 normal, S2 normal and intact distal pulses.  No extrasystoles are present. Exam reveals no gallop.   No murmur heard.  Pulses:       Radial pulses are 2+ on the right side, and 2+ on the left side.        Dorsalis pedis pulses are 2+ on the right side, and 2+ on the left side.        Posterior tibial pulses are 2+ on the right side, and 2+ on the left side.   Pulmonary/Chest: Effort normal and breath sounds normal. No accessory muscle usage. No tachypnea. No respiratory distress. He has no wheezes. He has no rhonchi. He has no rales.   Abdominal: Soft. Bowel sounds are normal. He exhibits no distension. There is no tenderness. There is no rebound, no guarding and no CVA tenderness.   Musculoskeletal: Normal range of motion. He exhibits no edema, tenderness or deformity.   Neurological: He is alert and oriented to person, place, and time. No cranial nerve deficit or sensory deficit.   Skin: Skin is warm, dry and intact. Capillary refill takes less than 2 seconds. No rash noted. He is not diaphoretic. No cyanosis or erythema.   Psychiatric: He has a normal mood and affect. His speech is normal and behavior is normal. Cognition and memory are normal.   Nursing note and vitals reviewed.          Significant Labs:  Results for orders placed or performed during the hospital encounter of 05/23/19   Brain natriuretic peptide   Result Value Ref Range    BNP 15 0 - 99 pg/mL   CBC auto differential   Result Value Ref Range    WBC 6.66 3.90 - 12.70 K/uL    RBC 4.99 4.60 - 6.20 M/uL    Hemoglobin 14.0 14.0 - 18.0 g/dL    Hematocrit 43.1 40.0 - 54.0 %    Mean Corpuscular Volume 86 82 - 98 fL    Mean  Corpuscular Hemoglobin 28.1 27.0 - 31.0 pg    Mean Corpuscular Hemoglobin Conc 32.5 32.0 - 36.0 g/dL    RDW 13.4 11.5 - 14.5 %    Platelets 202 150 - 350 K/uL    MPV 10.4 9.2 - 12.9 fL    Gran # (ANC) 4.9 1.8 - 7.7 K/uL    Lymph # 1.3 1.0 - 4.8 K/uL    Mono # 0.4 0.3 - 1.0 K/uL    Eos # 0.1 0.0 - 0.5 K/uL    Baso # 0.02 0.00 - 0.20 K/uL    Gran% 73.9 (H) 38.0 - 73.0 %    Lymph% 19.1 18.0 - 48.0 %    Mono% 5.4 4.0 - 15.0 %    Eosinophil% 1.1 0.0 - 8.0 %    Basophil% 0.3 0.0 - 1.9 %    Differential Method Automated    Comprehensive metabolic panel   Result Value Ref Range    Sodium 141 136 - 145 mmol/L    Potassium 3.5 3.5 - 5.1 mmol/L    Chloride 104 95 - 110 mmol/L    CO2 28 23 - 29 mmol/L    Glucose 100 70 - 110 mg/dL    BUN, Bld 16 6 - 20 mg/dL    Creatinine 1.2 0.5 - 1.4 mg/dL    Calcium 10.3 8.7 - 10.5 mg/dL    Total Protein 7.6 6.0 - 8.4 g/dL    Albumin 4.1 3.5 - 5.2 g/dL    Total Bilirubin 0.6 0.1 - 1.0 mg/dL    Alkaline Phosphatase 58 55 - 135 U/L    AST 38 10 - 40 U/L    ALT 23 10 - 44 U/L    Anion Gap 9 8 - 16 mmol/L    eGFR if African American >60.0 >60 mL/min/1.73 m^2    eGFR if non African American >60.0 >60 mL/min/1.73 m^2   Troponin I   Result Value Ref Range    Troponin I 3.867 (H) 0.000 - 0.026 ng/mL   Protime-INR   Result Value Ref Range    Prothrombin Time 10.4 9.0 - 12.5 sec    INR 1.0 0.8 - 1.2   APTT   Result Value Ref Range    aPTT 27.8 21.0 - 32.0 sec   Drug screen panel, emergency   Result Value Ref Range    Benzodiazepines Negative     Methadone metabolites Negative     Cocaine (Metab.) Negative     Opiate Scrn, Ur Negative     Barbiturate Screen, Ur Negative     Amphetamine Screen, Ur Negative     THC Negative     Phencyclidine Negative     Creatinine, Random Ur 167.9 23.0 - 375.0 mg/dL    Toxicology Information SEE COMMENT    CK   Result Value Ref Range     (H) 20 - 200 U/L   Troponin I   Result Value Ref Range    Troponin I 4.366 (H) 0.000 - 0.026 ng/mL   CBC auto differential    Result Value Ref Range    WBC 6.15 3.90 - 12.70 K/uL    RBC 4.74 4.60 - 6.20 M/uL    Hemoglobin 13.8 (L) 14.0 - 18.0 g/dL    Hematocrit 41.2 40.0 - 54.0 %    Mean Corpuscular Volume 87 82 - 98 fL    Mean Corpuscular Hemoglobin 29.1 27.0 - 31.0 pg    Mean Corpuscular Hemoglobin Conc 33.5 32.0 - 36.0 g/dL    RDW 13.7 11.5 - 14.5 %    Platelets 177 150 - 350 K/uL    MPV 10.2 9.2 - 12.9 fL    Gran # (ANC) 3.5 1.8 - 7.7 K/uL    Lymph # 2.1 1.0 - 4.8 K/uL    Mono # 0.4 0.3 - 1.0 K/uL    Eos # 0.1 0.0 - 0.5 K/uL    Baso # 0.01 0.00 - 0.20 K/uL    Gran% 57.5 38.0 - 73.0 %    Lymph% 34.6 18.0 - 48.0 %    Mono% 6.2 4.0 - 15.0 %    Eosinophil% 1.5 0.0 - 8.0 %    Basophil% 0.2 0.0 - 1.9 %    Differential Method Automated    Basic metabolic panel   Result Value Ref Range    Sodium 139 136 - 145 mmol/L    Potassium 3.7 3.5 - 5.1 mmol/L    Chloride 107 95 - 110 mmol/L    CO2 21 (L) 23 - 29 mmol/L    Glucose 93 70 - 110 mg/dL    BUN, Bld 15 6 - 20 mg/dL    Creatinine 0.9 0.5 - 1.4 mg/dL    Calcium 9.6 8.7 - 10.5 mg/dL    Anion Gap 11 8 - 16 mmol/L    eGFR if African American >60 >60 mL/min/1.73 m^2    eGFR if non African American >60 >60 mL/min/1.73 m^2   APTT   Result Value Ref Range    aPTT 58.7 (H) 21.0 - 32.0 sec   CBC auto differential   Result Value Ref Range    WBC 6.22 3.90 - 12.70 K/uL    RBC 4.82 4.60 - 6.20 M/uL    Hemoglobin 14.0 14.0 - 18.0 g/dL    Hematocrit 41.9 40.0 - 54.0 %    Mean Corpuscular Volume 87 82 - 98 fL    Mean Corpuscular Hemoglobin 29.0 27.0 - 31.0 pg    Mean Corpuscular Hemoglobin Conc 33.4 32.0 - 36.0 g/dL    RDW 13.7 11.5 - 14.5 %    Platelets 182 150 - 350 K/uL    MPV 9.7 9.2 - 12.9 fL    Gran # (ANC) 3.4 1.8 - 7.7 K/uL    Lymph # 2.3 1.0 - 4.8 K/uL    Mono # 0.4 0.3 - 1.0 K/uL    Eos # 0.1 0.0 - 0.5 K/uL    Baso # 0.01 0.00 - 0.20 K/uL    Gran% 55.3 38.0 - 73.0 %    Lymph% 36.8 18.0 - 48.0 %    Mono% 6.4 4.0 - 15.0 %    Eosinophil% 1.3 0.0 - 8.0 %    Basophil% 0.2 0.0 - 1.9 %    Differential  Method Automated       All pertinent labs within the past 24 hours have been reviewed.    Significant Imaging:  Imaging Results          X-Ray Chest AP Portable (Final result)  Result time 05/23/19 20:39:14    Final result by Flip Courtney MD (05/23/19 20:39:14)                 Impression:      No acute findings.      Electronically signed by: Flip Courtney MD  Date:    05/23/2019  Time:    20:39             Narrative:    EXAMINATION:  XR CHEST AP PORTABLE    CLINICAL HISTORY:  Chest Pain;    COMPARISON:  None    FINDINGS:  Lungs are clear.  Heart size within normal limits.No significant bony findings.                               I have reviewed all pertinent imaging results/findings within the past 24 hours.     EKG: (personally reviewed)  Normal sinus rhythm, inferior T wave inversions.  No previous to compare.            Assessment/Plan:     * NSTEMI (non-ST elevated myocardial infarction)  - Initial troponin 3.867, EKG with inferior T wave inversions.  Chest pain free at present.  - Trend serial cardiac enzymes and EKG.  - Check FLP.  - Will obtain 2D echo.  - ASA, beta blocker, and statin.  - Continue heparin drip per ACS protocol.   - Cardiology consult.  Keep NPO after MN for probable LHC.    Hyperlipidemia  - Increase home atorvastatin to 40mg daily.  - Check FLP.      VTE Risk Mitigation (From admission, onward)        Ordered     heparin 25,000 units in dextrose 5% (100 units/ml) IV bolus from bag INITIAL BOLUS (max bolus 4000 units)  Once      05/24/19 0345     heparin 25,000 units in dextrose 5% 250 mL (100 units/mL) infusion LOW INTENSITY nomogram - [facility]  Continuous      05/24/19 0345     heparin 25,000 units in dextrose 5% (100 units/ml) IV bolus from bag - ADDITIONAL PRN BOLUS - 60 units/kg (max bolus 4000 units)  As needed (PRN)      05/24/19 0345     heparin 25,000 units in dextrose 5% (100 units/ml) IV bolus from bag - ADDITIONAL PRN BOLUS - 30 units/kg (max bolus 4000 units)  As  needed (PRN)      05/24/19 0345     IP VTE HIGH RISK PATIENT  Once      05/24/19 0159     Place sequential compression device  Until discontinued      05/24/19 0159     Reason for No Pharmacological VTE Prophylaxis  Once      05/24/19 0159             Karlie Mcneill NP  Department of Hospital Medicine   [facility]

## 2019-05-24 NOTE — ASSESSMENT & PLAN NOTE
Troponin 3.867, 4.366, 2.365  .  EKG shows T wave inversion  Recommend that patient undergo LHC today for further evaluation and treatment  Dr. Alicea explained the risks, benefits and alternatives of the procedure in detail. The patient voices understanding and all questions have been answered. Patient agrees to proceed as planned.   NPO   Continue Heparin gtt  NS at 75/hr  To cath lab this morning with Dr. Rich.   Further recommendations to follow cath   Continue ASA, Plavix load, Statin, BB, Imdur  Echo pending

## 2019-05-24 NOTE — ASSESSMENT & PLAN NOTE
- Initial troponin 3.867, EKG with inferior T wave inversions.  Chest pain free at present.  - Trend serial cardiac enzymes and EKG.  - Check FLP.  - Will obtain 2D echo.  - ASA, beta blocker, and statin.  - Continue heparin drip per ACS protocol.   - Cardiology consult.  Keep NPO after MN for probable LHC.    5/24  ASA  Statin  Plavix  BB  Cards  NPO  LHC possible  Heparin Gtt

## 2019-05-24 NOTE — ASSESSMENT & PLAN NOTE
- Initial troponin 3.867, EKG with inferior T wave inversions.  Chest pain free at present.  - Trend serial cardiac enzymes and EKG.  - Check FLP.  - Will obtain 2D echo.  - ASA, beta blocker, and statin.  - Continue heparin drip per ACS protocol.   - Cardiology consult.  Keep NPO after MN for probable LHC.

## 2019-05-24 NOTE — CONSULTS
[facility]  Cardiology  Consult Note    Patient Name: Oumar Mccarthy  MRN: [id number]  Admission Date: 5/23/2019  Hospital Length of Stay: 1 days  Code Status: Full Code   Attending Provider: nAdrew Cox MD   Consulting Provider: MARIANNA De  Primary Care Physician: Leana Troy MD  Principal Problem:NSTEMI (non-ST elevated myocardial infarction)    Patient information was obtained from patient and ER records.     Inpatient consult to Cardiology  Consult performed by: MARIANNA Copeland  Consult ordered by: Karlie Mcneill NP  Reason for consult: NSTEMI         Subjective:     Chief Complaint:  Chest pain      HPI:   Mr. Mccarthy is a 32yo male with a PMHx of HLD, who presented to the ED with c/o substernal chest pain x 1-2 weeks. Pain had been intermittent until this morning when it became constant.  Pain is pressure-like in nature, moderate in intensity, radiates into left arm, and nonexertional.  No aggravating or alleviating factors.  No associated symptoms. Denies any SOB/ALEXIS, diaphoresis, N/V, palpitations, cough, edema, ABD pain, back pain, lightheadedness, dizziness, syncope, fever or chills.  Patient admits to noncompliance with statin and diet.  He denies any known family h/o premature CAD.  No past or current tobacco use.  He received 325mg ASA and SL NTG in ED with relief of chest pain. Initial work-up resulted troponin 3.867, 4.366, 2.365  .  EKG revealed NSR with T wave inversions in inferior leads. Given Plavix load, high-intensity statin, and has been started on Heparin gtt. ED echo pending. Currently CP free     Past Medical History:   Diagnosis Date    Adult general medical examination 11/28/2016    Hyperlipidemia     Leukopenia        History reviewed. No pertinent surgical history.    Review of patient's allergies indicates:  No Known Allergies    No current facility-administered medications on file prior to encounter.      Current Outpatient  Medications on File Prior to Encounter   Medication Sig    atorvastatin (LIPITOR) 20 MG tablet Take 1 tablet (20 mg total) by mouth once daily.    [DISCONTINUED] atorvastatin (LIPITOR) 10 MG tablet Take 1 tablet (10 mg total) by mouth every evening.     Family History     Problem Relation (Age of Onset)    Heart disease Paternal Grandfather    Hyperlipidemia Paternal Grandfather        Tobacco Use    Smoking status: Never Smoker    Smokeless tobacco: Never Used   Substance and Sexual Activity    Alcohol use: Yes     Comment: social    Drug use: No    Sexual activity: Yes     Partners: Female     Review of Systems   Constitution: Negative for diaphoresis, malaise/fatigue, weight gain and weight loss.   HENT: Negative for congestion and nosebleeds.    Cardiovascular: Positive for chest pain. Negative for claudication, cyanosis, dyspnea on exertion, irregular heartbeat, leg swelling, near-syncope, orthopnea, palpitations, paroxysmal nocturnal dyspnea and syncope.   Respiratory: Negative for cough, hemoptysis, shortness of breath, sleep disturbances due to breathing, snoring, sputum production and wheezing.    Hematologic/Lymphatic: Negative for bleeding problem. Does not bruise/bleed easily.   Skin: Negative for rash.   Musculoskeletal: Negative for arthritis, back pain, falls, joint pain, muscle cramps and muscle weakness.   Gastrointestinal: Negative for abdominal pain, constipation, diarrhea, heartburn, hematemesis, hematochezia, melena and nausea.   Genitourinary: Negative for dysuria, hematuria and nocturia.   Neurological: Negative for excessive daytime sleepiness, dizziness, headaches, light-headedness, loss of balance, numbness, vertigo and weakness.     Objective:     Vital Signs (Most Recent):  Temp: 98.8 °F (37.1 °C) (05/24/19 0754)  Pulse: 80 (05/24/19 0921)  Resp: 18 (05/24/19 0754)  BP: 130/79 (05/24/19 0921)  SpO2: 96 % (05/24/19 0921) Vital Signs (24h Range):  Temp:  [96.5 °F (35.8 °C)-98.8 °F  (37.1 °C)] 98.8 °F (37.1 °C)  Pulse:  [58-83] 80  Resp:  [14-24] 18  SpO2:  [96 %-100 %] 96 %  BP: (121-157)/(67-94) 130/79     Weight: 98 kg (216 lb 0.8 oz)  Body mass index is 27.74 kg/m².    SpO2: 96 %  O2 Device (Oxygen Therapy): room air    No intake or output data in the 24 hours ending 05/24/19 1025    Lines/Drains/Airways     Peripheral Intravenous Line                 Peripheral IV - Single Lumen 05/23/19 2010 18 G Right Antecubital less than 1 day         Peripheral IV - Single Lumen 05/23/19 2110 20 G Left Antecubital less than 1 day                Physical Exam   Constitutional: He is oriented to person, place, and time. He appears well-developed and well-nourished.   Neck: Neck supple. No JVD present.   Cardiovascular: Normal rate, regular rhythm, normal heart sounds and normal pulses. Exam reveals no friction rub.   No murmur heard.  Pulmonary/Chest: Effort normal and breath sounds normal. No respiratory distress. He has no wheezes. He has no rales.   Abdominal: Soft. Bowel sounds are normal. He exhibits no distension.   Musculoskeletal: He exhibits no edema or tenderness.   Neurological: He is alert and oriented to person, place, and time.   Skin: Skin is warm and dry. No rash noted.   Psychiatric: He has a normal mood and affect. His behavior is normal.   Nursing note and vitals reviewed.      Significant Labs:   All pertinent lab results from the last 24 hours have been reviewed. and    Assessment and Plan:     * NSTEMI (non-ST elevated myocardial infarction)   Troponin 3.867, 4.366, 2.365  .  EKG shows T wave inversion  Recommend that patient undergo LHC today for further evaluation and treatment  Dr. Alicea explained the risks, benefits and alternatives of the procedure in detail. The patient voices understanding and all questions have been answered. Patient agrees to proceed as planned.   NPO   Continue Heparin gtt  NS at 75/hr  To cath lab this morning with Dr. Rich.   Further recommendations  to follow cath   Continue ASA, Plavix load, Statin, BB, Imdur  Echo pending     Hyperlipidemia  Continue Statin         VTE Risk Mitigation (From admission, onward)        Ordered     heparin 25,000 units in dextrose 5% (100 units/ml) IV bolus from bag INITIAL BOLUS (max bolus 4000 units)  Once      05/24/19 0345     heparin 25,000 units in dextrose 5% 250 mL (100 units/mL) infusion LOW INTENSITY nomogram - [facility]  Continuous      05/24/19 0345     heparin 25,000 units in dextrose 5% (100 units/ml) IV bolus from bag - ADDITIONAL PRN BOLUS - 60 units/kg (max bolus 4000 units)  As needed (PRN)      05/24/19 0345     heparin 25,000 units in dextrose 5% (100 units/ml) IV bolus from bag - ADDITIONAL PRN BOLUS - 30 units/kg (max bolus 4000 units)  As needed (PRN)      05/24/19 0345     IP VTE HIGH RISK PATIENT  Once      05/24/19 0159     Place sequential compression device  Until discontinued      05/24/19 0159     Reason for No Pharmacological VTE Prophylaxis  Once      05/24/19 0159        Chart reviewed. Patient examined by Dr. Alicea and agrees with plan that has been outlined.     Thank you for your consult. I will follow-up with patient. Please contact us if you have any additional questions.    Marly Morfin, MARIANNA  Cardiology   [facility]

## 2019-05-24 NOTE — PLAN OF CARE
CM attempted bedside discharge planning assessment with patient, however patient was in a procedure at this time.  Patient's sisters at bedside and provided information.  Patient lives at home with his wife and is independent.  He does not use any medical equipment or have home health services.  He does not have any anticipated needs at this time.  Patient's sister will advise patient to call CM if he has any needs/questions.  CM provided a transitional care folder, information on advanced directives, information on pharmacy bedside delivery, and discharge planning begins on admission with contact information for any needs/questions.    D/C Plan: home  PCP:  Dr Troy  Preferred Pharmacy: Unsure  Discharge transportation: Family  [facility]: Link sent  Pharmacy Bedside Delivery: Unsure     05/24/19 1213   Discharge Assessment   Assessment Type Discharge Planning Assessment   Confirmed/corrected address and phone number on facesheet? Yes   Assessment information obtained from? Medical Record;Caregiver   Expected Length of Stay (days)   (TBD)   Communicated expected length of stay with patient/caregiver yes   Prior to hospitilization cognitive status: Alert/Oriented   Prior to hospitalization functional status: Independent   Current cognitive status: Alert/Oriented   Current Functional Status: Independent   Facility Arrived From: home   Lives With spouse;child(cheyenne), dependent   Able to Return to Prior Arrangements yes   Is patient able to care for self after discharge? Yes   Who are your caregiver(s) and their phone number(s)? Lila Mccarthy, spouse [phone]   Patient's perception of discharge disposition home or selfcare   Readmission Within the Last 30 Days no previous admission in last 30 days   Patient currently receives any other outside agency services? No   Equipment Currently Used at Home none   Do you have any problems affording any of your prescribed medications? TBD   Is the patient taking medications  as prescribed?   (not currently on prescription medications)   Does the patient have transportation home? Yes   Transportation Anticipated family or friend will provide   Dialysis Name and Scheduled days NA   Does the patient receive services at the [facility]? No   Discharge Plan A Home with family   DME Needed Upon Discharge  none   Patient/Family in Agreement with Plan yes

## 2019-05-24 NOTE — PLAN OF CARE
Problem: Adult Inpatient Plan of Care  Goal: Plan of Care Review  Outcome: Ongoing (interventions implemented as appropriate)  The patient has been sinus rhythm on the monitor. Heparin gtt infusing. Pt no longer complaining of chest pain at this time. Pt is resting. Will continue to monitor.

## 2019-05-24 NOTE — SUBJECTIVE & OBJECTIVE
Interval History: NSTEMI. Cards evaluating for LHC. Patient NPO. EKG reviewed    Review of Systems   Constitutional: Negative.  Negative for appetite change, fatigue and fever.   HENT: Negative.  Negative for congestion, sinus pressure and sore throat.    Eyes: Negative.  Negative for photophobia, discharge and visual disturbance.   Respiratory: Negative.  Negative for cough, chest tightness, shortness of breath and wheezing.    Cardiovascular: Positive for chest pain. Negative for palpitations.   Gastrointestinal: Negative.  Negative for abdominal pain, blood in stool, constipation, diarrhea, nausea and vomiting.   Endocrine: Negative.    Genitourinary: Negative.    Musculoskeletal: Negative.  Negative for myalgias, neck pain and neck stiffness.   Skin: Negative.    Allergic/Immunologic: Negative.    Neurological: Negative.  Negative for seizures, syncope, weakness and headaches.   Hematological: Negative.    Psychiatric/Behavioral: Negative.  Negative for agitation, behavioral problems, hallucinations, self-injury and suicidal ideas.     Objective:     Vital Signs (Most Recent):  Temp: 98.8 °F (37.1 °C) (05/24/19 0754)  Pulse: 80 (05/24/19 0921)  Resp: 18 (05/24/19 0754)  BP: 130/79 (05/24/19 0921)  SpO2: 96 % (05/24/19 0921) Vital Signs (24h Range):  Temp:  [96.5 °F (35.8 °C)-98.8 °F (37.1 °C)] 98.8 °F (37.1 °C)  Pulse:  [58-83] 80  Resp:  [14-24] 18  SpO2:  [96 %-100 %] 96 %  BP: (121-157)/(67-94) 130/79     Weight: 98 kg (216 lb 0.8 oz)  Body mass index is 27.74 kg/m².  No intake or output data in the 24 hours ending 05/24/19 0947   Physical Exam   Constitutional: He is oriented to person, place, and time. He appears well-developed and well-nourished.   HENT:   Head: Normocephalic and atraumatic.   Eyes: Pupils are equal, round, and reactive to light. EOM are normal.   Neck: Normal range of motion. Neck supple.   Cardiovascular: Normal rate, regular rhythm, normal heart sounds and intact distal pulses.    Pulmonary/Chest: Effort normal and breath sounds normal. No respiratory distress. He has no wheezes.   Abdominal: Soft. Bowel sounds are normal.   Musculoskeletal: Normal range of motion. He exhibits no deformity.   Neurological: He is alert and oriented to person, place, and time. He has normal reflexes.   Skin: Skin is warm and dry. Capillary refill takes less than 2 seconds.   Psychiatric: He has a normal mood and affect. His behavior is normal. Judgment and thought content normal.   Nursing note and vitals reviewed.      Significant Labs:   BMP:   Recent Labs   Lab 05/24/19  0235   GLU 93      K 3.7      CO2 21*   BUN 15   CREATININE 0.9   CALCIUM 9.6     CBC:   Recent Labs   Lab 05/24/19 0235 05/24/19  0357 05/24/19  0421   WBC 6.15 6.22 5.71   HGB 13.8* 14.0 13.4*   HCT 41.2 41.9 40.5    182 189     CMP:   Recent Labs   Lab 05/23/19 2015 05/24/19  0235    139   K 3.5 3.7    107   CO2 28 21*    93   BUN 16 15   CREATININE 1.2 0.9   CALCIUM 10.3 9.6   PROT 7.6  --    ALBUMIN 4.1  --    BILITOT 0.6  --    ALKPHOS 58  --    AST 38  --    ALT 23  --    ANIONGAP 9 11   EGFRNONAA >60.0 >60     All pertinent labs within the past 24 hours have been reviewed.    Significant Imaging: I have reviewed all pertinent imaging results/findings within the past 24 hours.

## 2019-05-24 NOTE — NURSING TRANSFER
Nursing Transfer Note      5/24/2019     Transfer To: 215    Transfer via bed    Transfer with cardiac monitoring    Transported by [employer] sent: None    Chart send with patient: Yes    Notified: spouse    Patient reassessed at: 5/24/2019 1315    Upon arrival to floor: TRANSFERRED TO ROOM. TRANSFERRED TO BED.  TELEMETRY MONITER ON.  IV FLUIDS ON PUMP AT PRESCRIBED RATE.  PROCEDURAL ACCESS SITE WITHOUT BLEEDING OR HEMATOMA.  DRESSING DRY AND INTACT.  CARE HANDED OFF To Nancie

## 2019-05-24 NOTE — SUBJECTIVE & OBJECTIVE
Past Medical History:   Diagnosis Date    Adult general medical examination 11/28/2016    Hyperlipidemia     Leukopenia        History reviewed. No pertinent surgical history.    Review of patient's allergies indicates:  No Known Allergies    No current facility-administered medications on file prior to encounter.      Current Outpatient Medications on File Prior to Encounter   Medication Sig    atorvastatin (LIPITOR) 10 MG tablet Take 1 tablet (10 mg total) by mouth every evening.    atorvastatin (LIPITOR) 20 MG tablet Take 1 tablet (20 mg total) by mouth once daily.     Family History     Problem Relation (Age of Onset)    Heart disease Paternal Grandfather    Hyperlipidemia Paternal Grandfather        Tobacco Use    Smoking status: Never Smoker    Smokeless tobacco: Never Used   Substance and Sexual Activity    Alcohol use: Yes     Comment: social    Drug use: No    Sexual activity: Yes     Partners: Female     Review of Systems   Constitutional: Negative for activity change, chills, diaphoresis, fatigue, fever and unexpected weight change.   HENT: Negative for congestion and sore throat.    Respiratory: Negative for cough, shortness of breath and wheezing.    Cardiovascular: Positive for chest pain. Negative for palpitations and leg swelling.   Gastrointestinal: Negative for abdominal distention, abdominal pain, blood in stool, constipation, diarrhea, nausea and vomiting.   Genitourinary: Negative for dysuria and hematuria.   Musculoskeletal: Negative for arthralgias, back pain and myalgias.   Skin: Negative for pallor, rash and wound.   Neurological: Negative for dizziness, syncope, weakness, light-headedness, numbness and headaches.   Psychiatric/Behavioral: Negative for confusion. The patient is not nervous/anxious.    All other systems reviewed and are negative.    Objective:     Vital Signs (Most Recent):  Temp: 96.5 °F (35.8 °C) (05/24/19 0354)  Pulse: 66 (05/24/19 0354)  Resp: 19 (05/24/19  0354)  BP: 123/80 (05/24/19 0354)  SpO2: 100 % (05/24/19 0354) Vital Signs (24h Range):  Temp:  [96.5 °F (35.8 °C)-98.6 °F (37 °C)] 96.5 °F (35.8 °C)  Pulse:  [58-83] 66  Resp:  [14-24] 19  SpO2:  [98 %-100 %] 100 %  BP: (121-157)/(67-94) 123/80     Weight: 99.8 kg (220 lb 0.3 oz)  Body mass index is 28.25 kg/m².    Physical Exam   Constitutional: He is oriented to person, place, and time. He appears well-developed and well-nourished. No distress.   HENT:   Head: Normocephalic and atraumatic.   Eyes: Conjunctivae are normal.   PERRL; EOM intact.   Neck: Normal range of motion. Neck supple. No JVD present.   Cardiovascular: Normal rate, regular rhythm, S1 normal, S2 normal and intact distal pulses.  No extrasystoles are present. Exam reveals no gallop.   No murmur heard.  Pulses:       Radial pulses are 2+ on the right side, and 2+ on the left side.        Dorsalis pedis pulses are 2+ on the right side, and 2+ on the left side.        Posterior tibial pulses are 2+ on the right side, and 2+ on the left side.   Pulmonary/Chest: Effort normal and breath sounds normal. No accessory muscle usage. No tachypnea. No respiratory distress. He has no wheezes. He has no rhonchi. He has no rales.   Abdominal: Soft. Bowel sounds are normal. He exhibits no distension. There is no tenderness. There is no rebound, no guarding and no CVA tenderness.   Musculoskeletal: Normal range of motion. He exhibits no edema, tenderness or deformity.   Neurological: He is alert and oriented to person, place, and time. No cranial nerve deficit or sensory deficit.   Skin: Skin is warm, dry and intact. Capillary refill takes less than 2 seconds. No rash noted. He is not diaphoretic. No cyanosis or erythema.   Psychiatric: He has a normal mood and affect. His speech is normal and behavior is normal. Cognition and memory are normal.   Nursing note and vitals reviewed.          Significant Labs:  Results for orders placed or performed during the  hospital encounter of 05/23/19   Brain natriuretic peptide   Result Value Ref Range    BNP 15 0 - 99 pg/mL   CBC auto differential   Result Value Ref Range    WBC 6.66 3.90 - 12.70 K/uL    RBC 4.99 4.60 - 6.20 M/uL    Hemoglobin 14.0 14.0 - 18.0 g/dL    Hematocrit 43.1 40.0 - 54.0 %    Mean Corpuscular Volume 86 82 - 98 fL    Mean Corpuscular Hemoglobin 28.1 27.0 - 31.0 pg    Mean Corpuscular Hemoglobin Conc 32.5 32.0 - 36.0 g/dL    RDW 13.4 11.5 - 14.5 %    Platelets 202 150 - 350 K/uL    MPV 10.4 9.2 - 12.9 fL    Gran # (ANC) 4.9 1.8 - 7.7 K/uL    Lymph # 1.3 1.0 - 4.8 K/uL    Mono # 0.4 0.3 - 1.0 K/uL    Eos # 0.1 0.0 - 0.5 K/uL    Baso # 0.02 0.00 - 0.20 K/uL    Gran% 73.9 (H) 38.0 - 73.0 %    Lymph% 19.1 18.0 - 48.0 %    Mono% 5.4 4.0 - 15.0 %    Eosinophil% 1.1 0.0 - 8.0 %    Basophil% 0.3 0.0 - 1.9 %    Differential Method Automated    Comprehensive metabolic panel   Result Value Ref Range    Sodium 141 136 - 145 mmol/L    Potassium 3.5 3.5 - 5.1 mmol/L    Chloride 104 95 - 110 mmol/L    CO2 28 23 - 29 mmol/L    Glucose 100 70 - 110 mg/dL    BUN, Bld 16 6 - 20 mg/dL    Creatinine 1.2 0.5 - 1.4 mg/dL    Calcium 10.3 8.7 - 10.5 mg/dL    Total Protein 7.6 6.0 - 8.4 g/dL    Albumin 4.1 3.5 - 5.2 g/dL    Total Bilirubin 0.6 0.1 - 1.0 mg/dL    Alkaline Phosphatase 58 55 - 135 U/L    AST 38 10 - 40 U/L    ALT 23 10 - 44 U/L    Anion Gap 9 8 - 16 mmol/L    eGFR if African American >60.0 >60 mL/min/1.73 m^2    eGFR if non African American >60.0 >60 mL/min/1.73 m^2   Troponin I   Result Value Ref Range    Troponin I 3.867 (H) 0.000 - 0.026 ng/mL   Protime-INR   Result Value Ref Range    Prothrombin Time 10.4 9.0 - 12.5 sec    INR 1.0 0.8 - 1.2   APTT   Result Value Ref Range    aPTT 27.8 21.0 - 32.0 sec   Drug screen panel, emergency   Result Value Ref Range    Benzodiazepines Negative     Methadone metabolites Negative     Cocaine (Metab.) Negative     Opiate Scrn, Ur Negative     Barbiturate Screen, Ur Negative      Amphetamine Screen, Ur Negative     THC Negative     Phencyclidine Negative     Creatinine, Random Ur 167.9 23.0 - 375.0 mg/dL    Toxicology Information SEE COMMENT    CK   Result Value Ref Range     (H) 20 - 200 U/L   Troponin I   Result Value Ref Range    Troponin I 4.366 (H) 0.000 - 0.026 ng/mL   CBC auto differential   Result Value Ref Range    WBC 6.15 3.90 - 12.70 K/uL    RBC 4.74 4.60 - 6.20 M/uL    Hemoglobin 13.8 (L) 14.0 - 18.0 g/dL    Hematocrit 41.2 40.0 - 54.0 %    Mean Corpuscular Volume 87 82 - 98 fL    Mean Corpuscular Hemoglobin 29.1 27.0 - 31.0 pg    Mean Corpuscular Hemoglobin Conc 33.5 32.0 - 36.0 g/dL    RDW 13.7 11.5 - 14.5 %    Platelets 177 150 - 350 K/uL    MPV 10.2 9.2 - 12.9 fL    Gran # (ANC) 3.5 1.8 - 7.7 K/uL    Lymph # 2.1 1.0 - 4.8 K/uL    Mono # 0.4 0.3 - 1.0 K/uL    Eos # 0.1 0.0 - 0.5 K/uL    Baso # 0.01 0.00 - 0.20 K/uL    Gran% 57.5 38.0 - 73.0 %    Lymph% 34.6 18.0 - 48.0 %    Mono% 6.2 4.0 - 15.0 %    Eosinophil% 1.5 0.0 - 8.0 %    Basophil% 0.2 0.0 - 1.9 %    Differential Method Automated    Basic metabolic panel   Result Value Ref Range    Sodium 139 136 - 145 mmol/L    Potassium 3.7 3.5 - 5.1 mmol/L    Chloride 107 95 - 110 mmol/L    CO2 21 (L) 23 - 29 mmol/L    Glucose 93 70 - 110 mg/dL    BUN, Bld 15 6 - 20 mg/dL    Creatinine 0.9 0.5 - 1.4 mg/dL    Calcium 9.6 8.7 - 10.5 mg/dL    Anion Gap 11 8 - 16 mmol/L    eGFR if African American >60 >60 mL/min/1.73 m^2    eGFR if non African American >60 >60 mL/min/1.73 m^2   APTT   Result Value Ref Range    aPTT 58.7 (H) 21.0 - 32.0 sec   CBC auto differential   Result Value Ref Range    WBC 6.22 3.90 - 12.70 K/uL    RBC 4.82 4.60 - 6.20 M/uL    Hemoglobin 14.0 14.0 - 18.0 g/dL    Hematocrit 41.9 40.0 - 54.0 %    Mean Corpuscular Volume 87 82 - 98 fL    Mean Corpuscular Hemoglobin 29.0 27.0 - 31.0 pg    Mean Corpuscular Hemoglobin Conc 33.4 32.0 - 36.0 g/dL    RDW 13.7 11.5 - 14.5 %    Platelets 182 150 - 350 K/uL    MPV 9.7  9.2 - 12.9 fL    Gran # (ANC) 3.4 1.8 - 7.7 K/uL    Lymph # 2.3 1.0 - 4.8 K/uL    Mono # 0.4 0.3 - 1.0 K/uL    Eos # 0.1 0.0 - 0.5 K/uL    Baso # 0.01 0.00 - 0.20 K/uL    Gran% 55.3 38.0 - 73.0 %    Lymph% 36.8 18.0 - 48.0 %    Mono% 6.4 4.0 - 15.0 %    Eosinophil% 1.3 0.0 - 8.0 %    Basophil% 0.2 0.0 - 1.9 %    Differential Method Automated       All pertinent labs within the past 24 hours have been reviewed.    Significant Imaging:  Imaging Results          X-Ray Chest AP Portable (Final result)  Result time 05/23/19 20:39:14    Final result by Flip Courtney MD (05/23/19 20:39:14)                 Impression:      No acute findings.      Electronically signed by: Flip Courtney MD  Date:    05/23/2019  Time:    20:39             Narrative:    EXAMINATION:  XR CHEST AP PORTABLE    CLINICAL HISTORY:  Chest Pain;    COMPARISON:  None    FINDINGS:  Lungs are clear.  Heart size within normal limits.No significant bony findings.                               I have reviewed all pertinent imaging results/findings within the past 24 hours.     EKG: (personally reviewed)  Normal sinus rhythm, inferior T wave inversions.  No previous to compare.

## 2019-05-24 NOTE — CHAPLAIN
Initial visit with patient and family.  Took time to assess the spiritual needs of pt and his family.  Pt and his family currently had no spiritual needs.  Spiritual care remains available as needed.    Chaplain Kurt Wahl M.Div., [facility]

## 2019-05-24 NOTE — PROGRESS NOTES
Called to his room for C/O of increased chest pain - stated his pain is a 6 on 0-10 scale - gave him SL Nitro and notified MD of increased chest pain - EKG ordered - no change in pain level after x2 SL Nitro - morning meds given and Dr Cox in room to see patient. Dr Cox notified Cardio of patient chest pain and he will be seeing patient this morning.

## 2019-05-24 NOTE — HOSPITAL COURSE
5/24  Patient admitted to hospital with a NSTEMI, placed on heparin Gtt, initiated on asa, plavix, statin, nitro therapy. Cardiology placed on consult and consideration for LHC today in progress. Patient Troponins peaking at 4.366 with a trend downward now at 2.365. Patient with complaint of CP at present described as a dull ache with improvement in Left Arm numbness as compared to admission. EKG ordered and reviewed. Discussed with Cardiology possible tridil Gtt and ICU transfer - at present we will monitor on telemetry and maintain a high level of vigilance.    5/25/19  Patient seen this morning at bedside. Resting comfortably. No recurrence of pain. LHC showed normal coronaries. Cardiology input appreciated, stable for discharge.

## 2019-05-25 VITALS
OXYGEN SATURATION: 100 % | WEIGHT: 218.25 LBS | RESPIRATION RATE: 18 BRPM | SYSTOLIC BLOOD PRESSURE: 113 MMHG | TEMPERATURE: 98 F | HEART RATE: 78 BPM | BODY MASS INDEX: 28.01 KG/M2 | DIASTOLIC BLOOD PRESSURE: 57 MMHG | HEIGHT: 74 IN

## 2019-05-25 PROBLEM — R07.89 OTHER CHEST PAIN: Status: RESOLVED | Noted: 2019-05-25 | Resolved: 2019-05-25

## 2019-05-25 PROBLEM — R07.89 OTHER CHEST PAIN: Status: ACTIVE | Noted: 2019-05-25

## 2019-05-25 PROBLEM — I21.4 NSTEMI (NON-ST ELEVATED MYOCARDIAL INFARCTION): Status: RESOLVED | Noted: 2019-05-23 | Resolved: 2019-05-25

## 2019-05-25 LAB
ANION GAP SERPL CALC-SCNC: 6 MMOL/L (ref 8–16)
APTT BLDCRRT: 30.2 SEC (ref 21–32)
BASOPHILS # BLD AUTO: 0.01 K/UL (ref 0–0.2)
BASOPHILS # BLD AUTO: 0.02 K/UL (ref 0–0.2)
BASOPHILS NFR BLD: 0.2 % (ref 0–1.9)
BASOPHILS NFR BLD: 0.4 % (ref 0–1.9)
BUN SERPL-MCNC: 12 MG/DL (ref 6–20)
CALCIUM SERPL-MCNC: 9.5 MG/DL (ref 8.7–10.5)
CHLORIDE SERPL-SCNC: 106 MMOL/L (ref 95–110)
CO2 SERPL-SCNC: 24 MMOL/L (ref 23–29)
CREAT SERPL-MCNC: 1.1 MG/DL (ref 0.5–1.4)
DIFFERENTIAL METHOD: ABNORMAL
DIFFERENTIAL METHOD: ABNORMAL
EOSINOPHIL # BLD AUTO: 0.1 K/UL (ref 0–0.5)
EOSINOPHIL # BLD AUTO: 0.1 K/UL (ref 0–0.5)
EOSINOPHIL NFR BLD: 1.3 % (ref 0–8)
EOSINOPHIL NFR BLD: 2.4 % (ref 0–8)
ERYTHROCYTE [DISTWIDTH] IN BLOOD BY AUTOMATED COUNT: 13.2 % (ref 11.5–14.5)
ERYTHROCYTE [DISTWIDTH] IN BLOOD BY AUTOMATED COUNT: 13.6 % (ref 11.5–14.5)
EST. GFR  (AFRICAN AMERICAN): >60 ML/MIN/1.73 M^2
EST. GFR  (NON AFRICAN AMERICAN): >60 ML/MIN/1.73 M^2
GLUCOSE SERPL-MCNC: 86 MG/DL (ref 70–110)
HCT VFR BLD AUTO: 39.6 % (ref 40–54)
HCT VFR BLD AUTO: 42.2 % (ref 40–54)
HGB BLD-MCNC: 13.1 G/DL (ref 14–18)
HGB BLD-MCNC: 13.8 G/DL (ref 14–18)
LYMPHOCYTES # BLD AUTO: 1.1 K/UL (ref 1–4.8)
LYMPHOCYTES # BLD AUTO: 1.7 K/UL (ref 1–4.8)
LYMPHOCYTES NFR BLD: 21.8 % (ref 18–48)
LYMPHOCYTES NFR BLD: 36.2 % (ref 18–48)
MCH RBC QN AUTO: 28.6 PG (ref 27–31)
MCH RBC QN AUTO: 28.9 PG (ref 27–31)
MCHC RBC AUTO-ENTMCNC: 32.7 G/DL (ref 32–36)
MCHC RBC AUTO-ENTMCNC: 33.1 G/DL (ref 32–36)
MCV RBC AUTO: 87 FL (ref 82–98)
MCV RBC AUTO: 87 FL (ref 82–98)
MONOCYTES # BLD AUTO: 0.3 K/UL (ref 0.3–1)
MONOCYTES # BLD AUTO: 0.4 K/UL (ref 0.3–1)
MONOCYTES NFR BLD: 6.6 % (ref 4–15)
MONOCYTES NFR BLD: 7.8 % (ref 4–15)
NEUTROPHILS # BLD AUTO: 2.5 K/UL (ref 1.8–7.7)
NEUTROPHILS # BLD AUTO: 3.6 K/UL (ref 1.8–7.7)
NEUTROPHILS NFR BLD: 54.4 % (ref 38–73)
NEUTROPHILS NFR BLD: 68.9 % (ref 38–73)
PLATELET # BLD AUTO: 177 K/UL (ref 150–350)
PLATELET # BLD AUTO: 180 K/UL (ref 150–350)
PMV BLD AUTO: 10 FL (ref 9.2–12.9)
PMV BLD AUTO: 10 FL (ref 9.2–12.9)
POTASSIUM SERPL-SCNC: 4 MMOL/L (ref 3.5–5.1)
RBC # BLD AUTO: 4.53 M/UL (ref 4.6–6.2)
RBC # BLD AUTO: 4.83 M/UL (ref 4.6–6.2)
SODIUM SERPL-SCNC: 136 MMOL/L (ref 136–145)
WBC # BLD AUTO: 4.67 K/UL (ref 3.9–12.7)
WBC # BLD AUTO: 5.24 K/UL (ref 3.9–12.7)

## 2019-05-25 PROCEDURE — 99233 PR SUBSEQUENT HOSPITAL CARE,LEVL III: ICD-10-PCS | Mod: ,,, | Performed by: INTERNAL MEDICINE

## 2019-05-25 PROCEDURE — 36415 COLL VENOUS BLD VENIPUNCTURE: CPT

## 2019-05-25 PROCEDURE — 85730 THROMBOPLASTIN TIME PARTIAL: CPT

## 2019-05-25 PROCEDURE — 85025 COMPLETE CBC W/AUTO DIFF WBC: CPT

## 2019-05-25 PROCEDURE — 25000003 PHARM REV CODE 250: Performed by: NURSE PRACTITIONER

## 2019-05-25 PROCEDURE — 80048 BASIC METABOLIC PNL TOTAL CA: CPT

## 2019-05-25 PROCEDURE — 25000003 PHARM REV CODE 250: Performed by: INTERNAL MEDICINE

## 2019-05-25 PROCEDURE — 99233 SBSQ HOSP IP/OBS HIGH 50: CPT | Mod: ,,, | Performed by: INTERNAL MEDICINE

## 2019-05-25 RX ORDER — ATORVASTATIN CALCIUM 40 MG/1
40 TABLET, FILM COATED ORAL NIGHTLY
Qty: 90 TABLET | Refills: 3 | Status: SHIPPED | OUTPATIENT
Start: 2019-05-25 | End: 2019-05-28 | Stop reason: SDUPTHER

## 2019-05-25 RX ORDER — ISOSORBIDE MONONITRATE 30 MG/1
30 TABLET, EXTENDED RELEASE ORAL DAILY
Status: DISCONTINUED | OUTPATIENT
Start: 2019-05-26 | End: 2019-05-25 | Stop reason: HOSPADM

## 2019-05-25 RX ORDER — CLOPIDOGREL BISULFATE 75 MG/1
75 TABLET ORAL DAILY
Qty: 30 TABLET | Refills: 11 | Status: SHIPPED | OUTPATIENT
Start: 2019-05-26 | End: 2019-05-28 | Stop reason: SDUPTHER

## 2019-05-25 RX ORDER — ASPIRIN 81 MG/1
81 TABLET ORAL DAILY
Refills: 0 | Status: ON HOLD | COMMUNITY
Start: 2019-05-26 | End: 2019-06-04 | Stop reason: HOSPADM

## 2019-05-25 RX ORDER — CLOPIDOGREL BISULFATE 75 MG/1
75 TABLET ORAL DAILY
Status: DISCONTINUED | OUTPATIENT
Start: 2019-05-25 | End: 2019-05-25 | Stop reason: HOSPADM

## 2019-05-25 RX ORDER — METOPROLOL TARTRATE 25 MG/1
25 TABLET, FILM COATED ORAL 2 TIMES DAILY
Qty: 60 TABLET | Refills: 11 | Status: ON HOLD | OUTPATIENT
Start: 2019-05-25 | End: 2019-06-04 | Stop reason: HOSPADM

## 2019-05-25 RX ADMIN — CLOPIDOGREL BISULFATE 75 MG: 75 TABLET ORAL at 11:05

## 2019-05-25 RX ADMIN — METOPROLOL TARTRATE 25 MG: 25 TABLET ORAL at 09:05

## 2019-05-25 RX ADMIN — ASPIRIN 81 MG: 81 TABLET, COATED ORAL at 09:05

## 2019-05-25 RX ADMIN — PANTOPRAZOLE SODIUM 40 MG: 40 TABLET, DELAYED RELEASE ORAL at 09:05

## 2019-05-25 RX ADMIN — ISOSORBIDE MONONITRATE 60 MG: 60 TABLET, EXTENDED RELEASE ORAL at 09:05

## 2019-05-25 NOTE — SUBJECTIVE & OBJECTIVE
Review of Systems   Constitution: Negative for diaphoresis, malaise/fatigue, weight gain and weight loss.   HENT: Negative for congestion and nosebleeds.    Cardiovascular: Negative for chest pain, claudication, cyanosis, dyspnea on exertion, irregular heartbeat, leg swelling, near-syncope, orthopnea, palpitations, paroxysmal nocturnal dyspnea and syncope.   Respiratory: Negative for cough, hemoptysis, shortness of breath, sleep disturbances due to breathing, snoring, sputum production and wheezing.    Hematologic/Lymphatic: Negative for bleeding problem. Does not bruise/bleed easily.   Skin: Negative for rash.   Musculoskeletal: Negative for arthritis, back pain, falls, joint pain, muscle cramps and muscle weakness.   Gastrointestinal: Negative for abdominal pain, constipation, diarrhea, heartburn, hematemesis, hematochezia, melena and nausea.   Genitourinary: Negative for dysuria, hematuria and nocturia.   Neurological: Negative for excessive daytime sleepiness, dizziness, headaches, light-headedness, loss of balance, numbness, vertigo and weakness.     Objective:     Vital Signs (Most Recent):  Temp: 98.7 °F (37.1 °C) (05/25/19 0740)  Pulse: 70 (05/25/19 0740)  Resp: 18 (05/25/19 0740)  BP: 120/70 (05/25/19 0740)  SpO2: 100 % (05/25/19 0740) Vital Signs (24h Range):  Temp:  [98 °F (36.7 °C)-98.7 °F (37.1 °C)] 98.7 °F (37.1 °C)  Pulse:  [62-77] 70  Resp:  [18] 18  SpO2:  [96 %-100 %] 100 %  BP: (107-120)/(51-70) 120/70     Weight: 99 kg (218 lb 4.1 oz)  Body mass index is 28.02 kg/m².     SpO2: 100 %  O2 Device (Oxygen Therapy): room air(pt in restroom )      Intake/Output Summary (Last 24 hours) at 5/25/2019 1114  Last data filed at 5/24/2019 1700  Gross per 24 hour   Intake 240 ml   Output --   Net 240 ml       Lines/Drains/Airways     Peripheral Intravenous Line                 Peripheral IV - Single Lumen 05/23/19 2010 18 G Right Antecubital 1 day         Peripheral IV - Single Lumen 05/23/19 2110 20 G  Left Antecubital 1 day                Physical Exam   Constitutional: He is oriented to person, place, and time. He appears well-developed and well-nourished.   Neck: Neck supple. No JVD present.   Cardiovascular: Normal rate, regular rhythm, normal heart sounds and normal pulses. Exam reveals no friction rub.   No murmur heard.  Pulmonary/Chest: Effort normal and breath sounds normal. No respiratory distress. He has no wheezes. He has no rales.   Abdominal: Soft. Bowel sounds are normal. He exhibits no distension.   Musculoskeletal: He exhibits no edema or tenderness.   Neurological: He is alert and oriented to person, place, and time.   Skin: Skin is warm and dry. No rash noted.   Left radial access site without redness, tenderness, swelling, or drainage. There is no active external bleeding or hematoma.    Psychiatric: He has a normal mood and affect. His behavior is normal.   Nursing note and vitals reviewed.      Significant Labs:   All pertinent lab results from the last 24 hours have been reviewed. and   Recent Lab Results       05/25/19  0503   05/24/19  2359        Anion Gap 6       aPTT 30.2  Comment:  aPTT therapeutic range = 39-69 seconds       Baso # 0.01 0.02     Basophil% 0.2 0.4     BUN, Bld 12       Calcium 9.5       Chloride 106       CO2 24       Creatinine 1.1       Differential Method Automated Automated     eGFR if  >60       eGFR if non  >60  Comment:  Calculation used to obtain the estimated glomerular filtration  rate (eGFR) is the CKD-EPI equation.          Eos # 0.1 0.1     Eosinophil% 1.3 2.4     Glucose 86       Gran # (ANC) 3.6 2.5     Gran% 68.9 54.4     Hematocrit 42.2 39.6     Hemoglobin 13.8 13.1     Lymph # 1.1 1.7     Lymph% 21.8 36.2     MCH 28.6 28.9     MCHC 32.7 33.1     MCV 87 87     Mono # 0.4 0.3     Mono% 7.8 6.6     MPV 10.0 10.0     Platelets 177 180     Potassium 4.0       RBC 4.83 4.53     RDW 13.6 13.2     Sodium 136       WBC 5.24 4.67            Significant Imaging: Echocardiogram:   2D echo with color flow doppler:   Results for orders placed or performed during the hospital encounter of 05/23/19   2D echo with color flow doppler   Result Value Ref Range    QEF 55 55 - 65    Diastolic Dysfunction No     Mitral Valve Mobility NORMAL     Tricuspid Valve Regurgitation TRIVIAL TO MILD

## 2019-05-25 NOTE — ASSESSMENT & PLAN NOTE
- Increase home atorvastatin to 40mg daily.  - Check FLP.    5/24  Statin  Cards  Monitor    5/25/19  Statins   Diet and Exercise advised

## 2019-05-25 NOTE — HOSPITAL COURSE
5/25/19- Patient is s/p [facility] on yesterday. Cath showed normal coronaries. Unsure if patient had coronary spasms. Troponin trending down. Labs and vitals stable. Has no recurrent chest pain

## 2019-05-25 NOTE — PROGRESS NOTES
[facility] -   Cardiology  Progress Note    Patient Name: Oumar Mccarthy  MRN: [id number]  Admission Date: 5/23/2019  Hospital Length of Stay: 2 days  Code Status: Full Code   Attending Physician: Julien Mazariegos MD   Primary Care Physician: Leana Troy MD  Expected Discharge Date: 5/27/2019  Principal Problem:NSTEMI (non-ST elevated myocardial infarction)    Subjective:   Brief HPI:  Mr. Mccarthy is a 34yo male with a PMHx of HLD, who presented to the ED with c/o substernal chest pain x 1-2 weeks. Pain had been intermittent until this morning when it became constant.  Pain is pressure-like in nature, moderate in intensity, radiates into left arm, and nonexertional.  No aggravating or alleviating factors.  No associated symptoms. Denies any SOB/ALEXIS, diaphoresis, N/V, palpitations, cough, edema, ABD pain, back pain, lightheadedness, dizziness, syncope, fever or chills.  Patient admits to noncompliance with statin and diet.  He denies any known family h/o premature CAD.  No past or current tobacco use.  He received 325mg ASA and SL NTG in ED with relief of chest pain. Initial work-up resulted troponin 3.867, 4.366, 2.365  .  EKG revealed NSR with T wave inversions in inferior leads. Given Plavix load, high-intensity statin, and has been started on Heparin gtt. ED echo pending. Currently CP free     Hospital Course:   5/25/19- Patient is s/p [facility] on yesterday. Cath showed normal coronaries. Unsure if patient had coronary spasms. Troponin trending down. Labs and vitals stable. Has no recurrent chest pain         Review of Systems   Constitution: Negative for diaphoresis, malaise/fatigue, weight gain and weight loss.   HENT: Negative for congestion and nosebleeds.    Cardiovascular: Negative for chest pain, claudication, cyanosis, dyspnea on exertion, irregular heartbeat, leg swelling, near-syncope, orthopnea, palpitations, paroxysmal nocturnal dyspnea and syncope.   Respiratory: Negative for  cough, hemoptysis, shortness of breath, sleep disturbances due to breathing, snoring, sputum production and wheezing.    Hematologic/Lymphatic: Negative for bleeding problem. Does not bruise/bleed easily.   Skin: Negative for rash.   Musculoskeletal: Negative for arthritis, back pain, falls, joint pain, muscle cramps and muscle weakness.   Gastrointestinal: Negative for abdominal pain, constipation, diarrhea, heartburn, hematemesis, hematochezia, melena and nausea.   Genitourinary: Negative for dysuria, hematuria and nocturia.   Neurological: Negative for excessive daytime sleepiness, dizziness, headaches, light-headedness, loss of balance, numbness, vertigo and weakness.     Objective:     Vital Signs (Most Recent):  Temp: 98.7 °F (37.1 °C) (05/25/19 0740)  Pulse: 70 (05/25/19 0740)  Resp: 18 (05/25/19 0740)  BP: 120/70 (05/25/19 0740)  SpO2: 100 % (05/25/19 0740) Vital Signs (24h Range):  Temp:  [98 °F (36.7 °C)-98.7 °F (37.1 °C)] 98.7 °F (37.1 °C)  Pulse:  [62-77] 70  Resp:  [18] 18  SpO2:  [96 %-100 %] 100 %  BP: (107-120)/(51-70) 120/70     Weight: 99 kg (218 lb 4.1 oz)  Body mass index is 28.02 kg/m².     SpO2: 100 %  O2 Device (Oxygen Therapy): room air(pt in restroom )      Intake/Output Summary (Last 24 hours) at 5/25/2019 1114  Last data filed at 5/24/2019 1700  Gross per 24 hour   Intake 240 ml   Output --   Net 240 ml       Lines/Drains/Airways     Peripheral Intravenous Line                 Peripheral IV - Single Lumen 05/23/19 2010 18 G Right Antecubital 1 day         Peripheral IV - Single Lumen 05/23/19 2110 20 G Left Antecubital 1 day                Physical Exam   Constitutional: He is oriented to person, place, and time. He appears well-developed and well-nourished.   Neck: Neck supple. No JVD present.   Cardiovascular: Normal rate, regular rhythm, normal heart sounds and normal pulses. Exam reveals no friction rub.   No murmur heard.  Pulmonary/Chest: Effort normal and breath sounds normal. No  respiratory distress. He has no wheezes. He has no rales.   Abdominal: Soft. Bowel sounds are normal. He exhibits no distension.   Musculoskeletal: He exhibits no edema or tenderness.   Neurological: He is alert and oriented to person, place, and time.   Skin: Skin is warm and dry. No rash noted.   Left radial access site without redness, tenderness, swelling, or drainage. There is no active external bleeding or hematoma.    Psychiatric: He has a normal mood and affect. His behavior is normal.   Nursing note and vitals reviewed.      Significant Labs:   All pertinent lab results from the last 24 hours have been reviewed. and   Recent Lab Results       05/25/19  0503   05/24/19  2359        Anion Gap 6       aPTT 30.2  Comment:  aPTT therapeutic range = 39-69 seconds       Baso # 0.01 0.02     Basophil% 0.2 0.4     BUN, Bld 12       Calcium 9.5       Chloride 106       CO2 24       Creatinine 1.1       Differential Method Automated Automated     eGFR if  >60       eGFR if non  >60  Comment:  Calculation used to obtain the estimated glomerular filtration  rate (eGFR) is the CKD-EPI equation.          Eos # 0.1 0.1     Eosinophil% 1.3 2.4     Glucose 86       Gran # (ANC) 3.6 2.5     Gran% 68.9 54.4     Hematocrit 42.2 39.6     Hemoglobin 13.8 13.1     Lymph # 1.1 1.7     Lymph% 21.8 36.2     MCH 28.6 28.9     MCHC 32.7 33.1     MCV 87 87     Mono # 0.4 0.3     Mono% 7.8 6.6     MPV 10.0 10.0     Platelets 177 180     Potassium 4.0       RBC 4.83 4.53     RDW 13.6 13.2     Sodium 136       WBC 5.24 4.67           Significant Imaging: Echocardiogram:   2D echo with color flow doppler:   Results for orders placed or performed during the hospital encounter of 05/23/19   2D echo with color flow doppler   Result Value Ref Range    QEF 55 55 - 65    Diastolic Dysfunction No     Mitral Valve Mobility NORMAL     Tricuspid Valve Regurgitation TRIVIAL TO MILD         Assessment and Plan:       *  NSTEMI (non-ST elevated myocardial infarction)   Troponin 3.867, 4.366, 2.365  .  EKG shows T wave inversion  Recommend that patient undergo LHC today for further evaluation and treatment  Dr. Alicea explained the risks, benefits and alternatives of the procedure in detail. The patient voices understanding and all questions have been answered. Patient agrees to proceed as planned.   NPO   Continue Heparin gtt  NS at 75/hr  To cath lab this morning with Dr. Rich.   Further recommendations to follow cath   Continue ASA, Plavix load, Statin, BB, Imdur  Echo pending     5/25/19  -normal coronaries.   Possible coronary spasm  Clear for discharge from Cardiology standpoint   DC with ASA, Plavix for 1 year. Statin, BB  No ACE due to normal LVF  Will follow up in clinic next week. Clinic to arrange appt     Hyperlipidemia  Continue Statin         VTE Risk Mitigation (From admission, onward)        Ordered     IP VTE HIGH RISK PATIENT  Once      05/24/19 0159     Place sequential compression device  Until discontinued      05/24/19 0159     Reason for No Pharmacological VTE Prophylaxis  Once      05/24/19 0159        Chart reviewed. Patient examined by Dr. Alicea and agrees with plan that has been outlined.     Marly Morfin, MARIANNA  Cardiology  [facility]

## 2019-05-25 NOTE — DISCHARGE SUMMARY
[facility] Hospital Medicine  Discharge Summary      Patient Name: Oumar Mccarthy  MRN: [id number]  Admission Date: 5/23/2019  Hospital Length of Stay: 2 days  Discharge Date and Time:  05/25/2019 12:58 PM  Attending Physician: Julien Mazariegos MD   Discharging Provider: Julien Mazariegos MD  Primary Care Provider: Leana Troy MD      HPI:   Mr. Mccarthy is a 34yo male with a PMHx of HLD, who presented to the ED with c/o substernal chest pain x 1-2 weeks.  Pain had been intermittent until this morning when it became constant.  Pain is pressure-like in nature, moderate in intensity, radiates into left arm, and nonexertional.  No aggravating or alleviating factors.  No associated symptoms.  Denies any SOB/ALEXIS, diaphoresis, N/V, palpitations, cough, edema, ABD pain, back pain, lightheadedness, dizziness, syncope, fever or chills.  Patient admits to noncompliance with statin and diet.  He denies any known family h/o premature CAD.  No past or current tobacco use.  He received 325mg ASA and SL NTG in ED with relief of chest pain.  Initial work-up resulted troponin 3.867.  EKG revealed NSR with T wave inversions in inferior leads.  Case discussed with Cardiology in ED, who recommend Plavix load, high-intensity statin, and UFH.  Hospital Medicine was called for admission.  Currently, patient appears comfortable in NAD.  Denies any chest pain at present.      Procedure(s) (LRB):  CATHETERIZATION, HEART, LEFT (Left)      Hospital Course:   5/24  Patient admitted to hospital with a NSTEMI, placed on heparin Gtt, initiated on asa, plavix, statin, nitro therapy. Cardiology placed on consult and consideration for [facility] today in progress. Patient Troponins peaking at 4.366 with a trend downward now at 2.365. Patient with complaint of CP at present described as a dull ache with improvement in Left Arm numbness as compared to admission. EKG ordered and reviewed. Discussed with Cardiology possible tridil  Gtt and ICU transfer - at present we will monitor on telemetry and maintain a high level of vigilance.    5/25/19  Patient seen this morning at bedside. Resting comfortably. No recurrence of pain. LHC showed normal coronaries. Cardiology input appreciated, stable for discharge.      Consults:   Consults (From admission, onward)        Status Ordering Provider     Inpatient consult to Cardiology  Once     Provider:  MD Jade Jauregui CELESTE B.          Other chest pain  Chest Pain presumably to NSTEMI likely Coronary Spasm  S/p C normal coronaries  Cardiology Input appreciated   DC on ASA, Plavix, Statins, BB  Follow up with Cardiology One week       Leukopenia        Hyperlipidemia  - Increase home atorvastatin to 40mg daily.  - Check FLP.    5/24  Statin  Cards  Monitor    5/25/19  Statins   Diet and Exercise advised       Final Active Diagnoses:    Diagnosis Date Noted POA    Other chest pain [R07.89] 05/25/2019 Unknown    Hyperlipidemia [E78.5] 10/16/2018 Yes     Chronic      Problems Resolved During this Admission:    Diagnosis Date Noted Date Resolved POA    PRINCIPAL PROBLEM:  NSTEMI (non-ST elevated myocardial infarction) [I21.4] 05/23/2019 05/25/2019 Yes       Discharged Condition: good    Disposition: Home or Self Care    Follow Up:  Follow-up Information     Reed Alicea Md, MD In 1 week.    Specialties:  Cardiology, Internal Medicine  Contact information:  [id number]  [place] [address] [phone]                 Patient Instructions:      Diet Cardiac     Notify your health care provider if you experience any of the following:  persistent nausea and vomiting or diarrhea     Notify your health care provider if you experience any of the following:  temperature >100.4     Notify your health care provider if you experience any of the following:  severe uncontrolled pain     Notify your health care provider if you experience any of the following:  difficulty breathing or  increased cough     Activity as tolerated       Significant Diagnostic Studies: Labs:   BMP:   Recent Labs   Lab 05/23/19 2015 05/24/19  0235 05/25/19  0503    93 86    139 136   K 3.5 3.7 4.0    107 106   CO2 28 21* 24   BUN 16 15 12   CREATININE 1.2 0.9 1.1   CALCIUM 10.3 9.6 9.5    and CBC   Recent Labs   Lab 05/24/19  0421 05/24/19  2359 05/25/19  0503   WBC 5.71 4.67 5.24   HGB 13.4* 13.1* 13.8*   HCT 40.5 39.6* 42.2    180 177       Pending Diagnostic Studies:     Procedure Component Value Units Date/Time    IR Heart Cath Images [[id number]] Resulted:  05/24/19 1159    Order Status:  Sent Lab Status:  In process Updated:  05/24/19 1159         Medications:  Reconciled Home Medications:      Medication List      START taking these medications    aspirin 81 MG EC tablet  Commonly known as:  ECOTRIN  Take 1 tablet (81 mg total) by mouth once daily.  Start taking on:  5/26/2019     clopidogrel 75 mg tablet  Commonly known as:  PLAVIX  Take 1 tablet (75 mg total) by mouth once daily.  Start taking on:  5/26/2019     metoprolol tartrate 25 MG tablet  Commonly known as:  LOPRESSOR  Take 1 tablet (25 mg total) by mouth 2 (two) times daily.        CHANGE how you take these medications    atorvastatin 40 MG tablet  Commonly known as:  LIPITOR  Take 1 tablet (40 mg total) by mouth every evening.  What changed:    · medication strength  · how much to take  · when to take this            Indwelling Lines/Drains at time of discharge:   Lines/Drains/Airways          None          Time spent on the discharge of patient: greater than 30 minutes  Patient was seen and examined on the date of discharge and determined to be suitable for discharge.         Julien Mazariegos MD  Department of Hospital Medicine  [facility]

## 2019-05-25 NOTE — NURSING
Pt discharged per MD order. Pt instructions and handout given to pt. Pt instructed to schedule and keep all f/u appointments. Pt verbalizes understanding. IV removed x2, tele box returned to monitor tech. Pt advised to call out when ready to go down.

## 2019-05-25 NOTE — ASSESSMENT & PLAN NOTE
Troponin 3.867, 4.366, 2.365  .  EKG shows T wave inversion  Recommend that patient undergo LHC today for further evaluation and treatment  Dr. Alicea explained the risks, benefits and alternatives of the procedure in detail. The patient voices understanding and all questions have been answered. Patient agrees to proceed as planned.   NPO   Continue Heparin gtt  NS at 75/hr  To cath lab this morning with Dr. Rich.   Further recommendations to follow cath   Continue ASA, Plavix load, Statin, BB, Imdur  Echo pending     5/25/19  -normal coronaries.   Possible coronary spasm  Clear for discharge from Cardiology standpoint   DC with ASA, Plavix for 1 year. Statin, BB  No ACE due to normal LVF  Will follow up in clinic next week. Clinic to arrange appt

## 2019-05-25 NOTE — ASSESSMENT & PLAN NOTE
Chest Pain presumably to NSTEMI likely Coronary Spasm  S/p [facility] normal coronaries  Cardiology Input appreciated   DC on ASA, Plavix, Statins, BB  Follow up with Cardiology One week

## 2019-05-25 NOTE — PLAN OF CARE
Problem: Adult Inpatient Plan of Care  Goal: Plan of Care Review  Outcome: Ongoing (interventions implemented as appropriate)  Discussed Plan of Care with patient and verbalized understanding - Patient remains AAOx4 - remains free of falls, accidents and trauma during the day shift. Bed is in the low position and the call light is within reach. Patient with left Heart cath today thru the left wrist, tolerated well. Will continue to monitor

## 2019-05-26 NOTE — PLAN OF CARE
05/26/19 1627   Final Note   Assessment Type Final Discharge Note   Anticipated Discharge Disposition Home   Right Care Referral Info   Post Acute Recommendation No Care

## 2019-05-27 ENCOUNTER — TELEPHONE (OUTPATIENT)
Dept: CARDIOLOGY | Facility: CLINIC | Age: 34
End: 2019-05-27

## 2019-05-27 NOTE — TELEPHONE ENCOUNTER
Returned pt call states would like to follow up tomorrow for hospital follow up.       I tried scheduling pt for 8:30am tomorrow with Angeline kirk. I sent Ashley a message to put pt on Angeline schedule tomorrow.

## 2019-05-27 NOTE — TELEPHONE ENCOUNTER
----- Message from Brittany Sylvester sent at 5/27/2019  8:23 AM CDT -----  Contact: patient  Type:  Patient Returning Call    Who Called:PATIENT  Who Left Message for Patient:  Does the patient know what this is regarding?:APPOINTMENT  Would the patient rather a call back or a response via [facility]? CALL  Best Call Back Number:[phone]  Additional Information: DUGLAS GUNTER.

## 2019-05-27 NOTE — TELEPHONE ENCOUNTER
Tried reaching pt to schedule hospital follow up no answer left message for pt to call back to schedule appt.

## 2019-05-27 NOTE — TELEPHONE ENCOUNTER
----- Message from Cara Pompa LPN sent at 5/27/2019 11:07 AM CDT -----  Contact: patient      ----- Message -----  From: Brittany Sylvester  Sent: 5/27/2019   8:23 AM  To: Hilario ROSAS Staff    Type:  Patient Returning Call    Who Called:PATIENT  Who Left Message for Patient:  Does the patient know what this is regarding?:APPOINTMENT  Would the patient rather a call back or a response [facility]? CALL  Best Call Back Number:[phone]  Additional Information: THANKS, DUGLAS.

## 2019-05-27 NOTE — TELEPHONE ENCOUNTER
----- Message from MARIANNA Copeland sent at 5/25/2019 11:19 AM CDT -----  Patient needs hospital follow up next week with me     Thanks

## 2019-05-28 ENCOUNTER — CLINICAL SUPPORT (OUTPATIENT)
Dept: CARDIOLOGY | Facility: CLINIC | Age: 34
End: 2019-05-28
Payer: COMMERCIAL

## 2019-05-28 ENCOUNTER — OFFICE VISIT (OUTPATIENT)
Dept: CARDIOLOGY | Facility: CLINIC | Age: 34
End: 2019-05-28
Payer: COMMERCIAL

## 2019-05-28 VITALS
BODY MASS INDEX: 28.21 KG/M2 | SYSTOLIC BLOOD PRESSURE: 124 MMHG | HEIGHT: 74 IN | WEIGHT: 219.81 LBS | HEART RATE: 78 BPM | DIASTOLIC BLOOD PRESSURE: 76 MMHG

## 2019-05-28 DIAGNOSIS — R79.89 ELEVATED TROPONIN: Primary | ICD-10-CM

## 2019-05-28 DIAGNOSIS — R79.89 ELEVATED TROPONIN: ICD-10-CM

## 2019-05-28 DIAGNOSIS — E78.49 OTHER HYPERLIPIDEMIA: Chronic | ICD-10-CM

## 2019-05-28 DIAGNOSIS — R94.31 NONSPECIFIC ABNORMAL ELECTROCARDIOGRAM (ECG) (EKG): ICD-10-CM

## 2019-05-28 PROCEDURE — 99213 PR OFFICE/OUTPT VISIT, EST, LEVL III, 20-29 MIN: ICD-10-PCS | Mod: S$GLB,,, | Performed by: PHYSICIAN ASSISTANT

## 2019-05-28 PROCEDURE — 93000 EKG 12-LEAD: ICD-10-PCS | Mod: S$GLB,,, | Performed by: INTERNAL MEDICINE

## 2019-05-28 PROCEDURE — 99999 PR PBB SHADOW E&M-EST. PATIENT-LVL III: CPT | Mod: PBBFAC,,, | Performed by: PHYSICIAN ASSISTANT

## 2019-05-28 PROCEDURE — 99999 PR PBB SHADOW E&M-EST. PATIENT-LVL III: ICD-10-PCS | Mod: PBBFAC,,, | Performed by: PHYSICIAN ASSISTANT

## 2019-05-28 PROCEDURE — 93000 ELECTROCARDIOGRAM COMPLETE: CPT | Mod: S$GLB,,, | Performed by: INTERNAL MEDICINE

## 2019-05-28 PROCEDURE — 99213 OFFICE O/P EST LOW 20 MIN: CPT | Mod: S$GLB,,, | Performed by: PHYSICIAN ASSISTANT

## 2019-05-28 RX ORDER — ATORVASTATIN CALCIUM 40 MG/1
40 TABLET, FILM COATED ORAL NIGHTLY
Qty: 90 TABLET | Refills: 3 | Status: SHIPPED | OUTPATIENT
Start: 2019-05-28 | End: 2019-06-25 | Stop reason: ALTCHOICE

## 2019-05-28 RX ORDER — CLOPIDOGREL BISULFATE 75 MG/1
75 TABLET ORAL DAILY
Qty: 30 TABLET | Refills: 11 | Status: ON HOLD | OUTPATIENT
Start: 2019-05-28 | End: 2019-06-04 | Stop reason: HOSPADM

## 2019-05-28 RX ORDER — ATORVASTATIN CALCIUM 40 MG/1
40 TABLET, FILM COATED ORAL NIGHTLY
Qty: 90 TABLET | Refills: 3 | Status: SHIPPED | OUTPATIENT
Start: 2019-05-28 | End: 2019-05-28 | Stop reason: SDUPTHER

## 2019-05-28 RX ORDER — CLOPIDOGREL BISULFATE 75 MG/1
75 TABLET ORAL DAILY
Qty: 30 TABLET | Refills: 11 | Status: SHIPPED | OUTPATIENT
Start: 2019-05-28 | End: 2019-05-28 | Stop reason: SDUPTHER

## 2019-05-28 RX ORDER — NITROGLYCERIN 0.4 MG/1
0.4 TABLET SUBLINGUAL EVERY 5 MIN PRN
Qty: 30 TABLET | Refills: 0 | Status: SHIPPED | OUTPATIENT
Start: 2019-05-28 | End: 2019-10-18

## 2019-05-28 NOTE — PROGRESS NOTES
Subjective:    Patient ID:  Oumar Mccarthy is a 33 y.o. male who presents for follow-up of Hospital Follow Up      HPI   Mr. Mccarthy is a 33 year old male patient whose current medical conditions include hyperlipidemia who presents today for hospital follow-up. Patient recently admitted to [facility] with chest pain/NSTEMI. He underwent LHC which showed normal coronaries, ? Vasospasm. Medical mgmt recommended. He returns today and states he is doing well. Still get bouts of vague left-sided chest discomfort. Waxes and wanes, not exertional. Not severe like what prompted his hospitalization. Notices it sometimes after he takes BB at night. No other associated symptoms. Denies any SOB. No lightheadedness, dizziness, palpitations, near syncope, or syncope. No PND, orthopnea, lower extremity edema. No radial access site complaints. Patient reports compliance with his medications, is taking DAPT. EKG today shows chronic T wave inversions inferior leads, no acute changes.     Review of Systems   Constitution: Negative for chills, decreased appetite, fever and malaise/fatigue.   HENT: Negative for congestion, hoarse voice and sore throat.    Eyes: Negative for blurred vision and discharge.   Cardiovascular: Negative for chest pain, claudication, cyanosis, dyspnea on exertion, irregular heartbeat, leg swelling, near-syncope, orthopnea, palpitations and paroxysmal nocturnal dyspnea.   Respiratory: Negative for cough, hemoptysis, shortness of breath, snoring, sputum production and wheezing.    Endocrine: Negative for cold intolerance and heat intolerance.   Hematologic/Lymphatic: Negative for bleeding problem. Does not bruise/bleed easily.   Skin: Negative for rash.   Musculoskeletal: Negative for arthritis, back pain, joint pain, joint swelling, muscle cramps, muscle weakness and myalgias.   Gastrointestinal: Negative for abdominal pain, constipation, diarrhea, heartburn, melena and nausea.   Genitourinary: Negative for  "hematuria.   Neurological: Negative for dizziness, focal weakness, headaches, light-headedness, loss of balance, numbness, paresthesias, seizures and weakness.   Psychiatric/Behavioral: Negative for memory loss. The patient does not have insomnia.    Allergic/Immunologic: Negative for hives.     /76 (BP Location: Right arm, Patient Position: Sitting, BP Method: Large (Manual))   Pulse 78   Ht 6' 2" (1.88 m)   Wt 99.7 kg (219 lb 12.8 oz)   BMI 28.22 kg/m²     Objective:    Physical Exam   Constitutional: He is oriented to person, place, and time. He appears well-developed and well-nourished. No distress.   HENT:   Head: Normocephalic and atraumatic.   Eyes: Pupils are equal, round, and reactive to light. Right eye exhibits no discharge. Left eye exhibits no discharge.   Neck: Neck supple. No JVD present.   Cardiovascular: Normal rate, regular rhythm, normal heart sounds and intact distal pulses. PMI is not displaced. Exam reveals no gallop, no S3, no S4 and no friction rub.   No murmur heard.  Pulmonary/Chest: Effort normal and breath sounds normal. No respiratory distress. He has no wheezes. He has no rales.   Abdominal: Soft. He exhibits no distension. There is no tenderness.   Musculoskeletal: He exhibits no edema.   Neurological: He is alert and oriented to person, place, and time.   Skin: Skin is warm and dry. He is not diaphoretic. No erythema.   Left radial access site with mild bruising; no erythema, drainage, or active bleeding   Psychiatric: He has a normal mood and affect. His behavior is normal.   Nursing note and vitals reviewed.      E. Angiographic Results     Diagnostic:          Patient has a right dominant coronary artery.        The coronary vessels are all normal.        - Left Main Coronary Artery:             The LM is normal. There is ADOLFO 3 flow.     - Left Anterior Descending Artery:             The LAD is normal. There is ADOLFO 3 flow.     - Left Circumflex Artery:             The " LCX is normal. There is ADOLFO 3 flow.     - Right Coronary Artery:             The RCA is normal. There is ADOLFO 3 flow.    2D Echo CONCLUSIONS     1 - Normal left ventricular systolic function (EF 55-60%).     2 - Normal left ventricular diastolic function.     3 - Normal right ventricular systolic function .     4 - No wall motion abnormalities.     5 - Trivial to mild tricuspid regurgitation.     6 - Concentric hypertrophy.   Assessment:       1. Elevated troponin    2. Other hyperlipidemia    3. Nonspecific abnormal electrocardiogram (ECG) (EKG)       Patient presents for hospital follow-up. Doing clinically well. Still with some episodes of vague chest discomfort, mild. ? Medication related. EKG is unchanged. Discussed switching to long acting Toprol vs waiting to see if symptoms improve/patient adjusts to medication. He prefers to watch/wait. Sublingual nitro sent to pharmacy for emergencies. Continue same meds in meantime.   Plan:   -Phone review at end of week for symptom check  -Continue same meds, ASA Plavix x 1 year  -Sublingual nitro for emergencies, counseled on usage  -Will plan on 6 week f/u with lab work if symptoms resolved via phone review    Chart reviewed. Dr. Rich agrees with plan as outlined above.

## 2019-05-30 ENCOUNTER — HOSPITAL ENCOUNTER (INPATIENT)
Facility: HOSPITAL | Age: 34
LOS: 5 days | Discharge: HOME | DRG: 281 | End: 2019-06-04
Attending: EMERGENCY MEDICINE | Admitting: INTERNAL MEDICINE
Payer: COMMERCIAL

## 2019-05-30 DIAGNOSIS — I21.4 NSTEMI (NON-ST ELEVATED MYOCARDIAL INFARCTION): Primary | ICD-10-CM

## 2019-05-30 DIAGNOSIS — R07.9 CHEST PAIN: ICD-10-CM

## 2019-05-30 LAB
ALBUMIN SERPL BCP-MCNC: 4 G/DL (ref 3.5–5.2)
ALP SERPL-CCNC: 65 U/L (ref 55–135)
ALT SERPL W/O P-5'-P-CCNC: 25 U/L (ref 10–44)
AMPHET+METHAMPHET UR QL: NEGATIVE
ANION GAP SERPL CALC-SCNC: 9 MMOL/L (ref 8–16)
APTT BLDCRRT: 27.4 SEC (ref 21–32)
APTT BLDCRRT: 57.8 SEC (ref 21–32)
AST SERPL-CCNC: 35 U/L (ref 10–40)
BARBITURATES UR QL SCN>200 NG/ML: NEGATIVE
BASOPHILS # BLD AUTO: 0.03 K/UL (ref 0–0.2)
BASOPHILS NFR BLD: 0.6 % (ref 0–1.9)
BENZODIAZ UR QL SCN>200 NG/ML: NEGATIVE
BILIRUB SERPL-MCNC: 0.6 MG/DL (ref 0.1–1)
BNP SERPL-MCNC: 34 PG/ML (ref 0–99)
BUN SERPL-MCNC: 14 MG/DL (ref 6–20)
BZE UR QL SCN: NEGATIVE
CALCIUM SERPL-MCNC: 10 MG/DL (ref 8.7–10.5)
CANNABINOIDS UR QL SCN: NEGATIVE
CHLORIDE SERPL-SCNC: 104 MMOL/L (ref 95–110)
CO2 SERPL-SCNC: 28 MMOL/L (ref 23–29)
CREAT SERPL-MCNC: 1.2 MG/DL (ref 0.5–1.4)
CREAT UR-MCNC: 212.7 MG/DL (ref 23–375)
DIFFERENTIAL METHOD: NORMAL
EOSINOPHIL # BLD AUTO: 0.1 K/UL (ref 0–0.5)
EOSINOPHIL NFR BLD: 2.7 % (ref 0–8)
ERYTHROCYTE [DISTWIDTH] IN BLOOD BY AUTOMATED COUNT: 13.1 % (ref 11.5–14.5)
EST. GFR  (AFRICAN AMERICAN): >60 ML/MIN/1.73 M^2
EST. GFR  (NON AFRICAN AMERICAN): >60 ML/MIN/1.73 M^2
GLUCOSE SERPL-MCNC: 81 MG/DL (ref 70–110)
HCT VFR BLD AUTO: 44 % (ref 40–54)
HGB BLD-MCNC: 14.4 G/DL (ref 14–18)
INR PPP: 1 (ref 0.8–1.2)
LYMPHOCYTES # BLD AUTO: 1.7 K/UL (ref 1–4.8)
LYMPHOCYTES NFR BLD: 32.2 % (ref 18–48)
MCH RBC QN AUTO: 28 PG (ref 27–31)
MCHC RBC AUTO-ENTMCNC: 32.7 G/DL (ref 32–36)
MCV RBC AUTO: 85 FL (ref 82–98)
METHADONE UR QL SCN>300 NG/ML: NEGATIVE
MONOCYTES # BLD AUTO: 0.5 K/UL (ref 0.3–1)
MONOCYTES NFR BLD: 9.2 % (ref 4–15)
NEUTROPHILS # BLD AUTO: 2.8 K/UL (ref 1.8–7.7)
NEUTROPHILS NFR BLD: 55.1 % (ref 38–73)
OPIATES UR QL SCN: NORMAL
PCP UR QL SCN>25 NG/ML: NEGATIVE
PLATELET # BLD AUTO: 205 K/UL (ref 150–350)
PMV BLD AUTO: 10.4 FL (ref 9.2–12.9)
POTASSIUM SERPL-SCNC: 3.7 MMOL/L (ref 3.5–5.1)
PROT SERPL-MCNC: 7.8 G/DL (ref 6–8.4)
PROTHROMBIN TIME: 10.4 SEC (ref 9–12.5)
RBC # BLD AUTO: 5.15 M/UL (ref 4.6–6.2)
SODIUM SERPL-SCNC: 141 MMOL/L (ref 136–145)
TOXICOLOGY INFORMATION: NORMAL
TROPONIN I SERPL DL<=0.01 NG/ML-MCNC: 4.3 NG/ML (ref 0–0.03)
TROPONIN I SERPL DL<=0.01 NG/ML-MCNC: 6.39 NG/ML (ref 0–0.03)
TROPONIN I SERPL DL<=0.01 NG/ML-MCNC: 6.82 NG/ML (ref 0–0.03)
WBC # BLD AUTO: 5.13 K/UL (ref 3.9–12.7)

## 2019-05-30 PROCEDURE — 25000003 PHARM REV CODE 250: Performed by: CLINIC/CENTER

## 2019-05-30 PROCEDURE — 93010 EKG 12-LEAD: ICD-10-PCS | Mod: ,,, | Performed by: INTERNAL MEDICINE

## 2019-05-30 PROCEDURE — 84484 ASSAY OF TROPONIN QUANT: CPT | Mod: 91

## 2019-05-30 PROCEDURE — 25000003 PHARM REV CODE 250: Performed by: EMERGENCY MEDICINE

## 2019-05-30 PROCEDURE — 99900035 HC TECH TIME PER 15 MIN (STAT): Mod: ER

## 2019-05-30 PROCEDURE — 25000003 PHARM REV CODE 250: Mod: ER | Performed by: EMERGENCY MEDICINE

## 2019-05-30 PROCEDURE — 96365 THER/PROPH/DIAG IV INF INIT: CPT | Mod: ER

## 2019-05-30 PROCEDURE — 80053 COMPREHEN METABOLIC PANEL: CPT | Mod: ER

## 2019-05-30 PROCEDURE — 84484 ASSAY OF TROPONIN QUANT: CPT | Mod: ER

## 2019-05-30 PROCEDURE — 96375 TX/PRO/DX INJ NEW DRUG ADDON: CPT | Mod: ER

## 2019-05-30 PROCEDURE — 85025 COMPLETE CBC W/AUTO DIFF WBC: CPT | Mod: ER

## 2019-05-30 PROCEDURE — 63600175 PHARM REV CODE 636 W HCPCS: Mod: ER | Performed by: EMERGENCY MEDICINE

## 2019-05-30 PROCEDURE — 85610 PROTHROMBIN TIME: CPT | Mod: ER

## 2019-05-30 PROCEDURE — 96366 THER/PROPH/DIAG IV INF ADDON: CPT | Mod: ER

## 2019-05-30 PROCEDURE — 85730 THROMBOPLASTIN TIME PARTIAL: CPT | Mod: ER

## 2019-05-30 PROCEDURE — 20000000 HC ICU ROOM

## 2019-05-30 PROCEDURE — 63600175 PHARM REV CODE 636 W HCPCS: Performed by: CLINIC/CENTER

## 2019-05-30 PROCEDURE — 99291 CRITICAL CARE FIRST HOUR: CPT | Mod: 25,ER

## 2019-05-30 PROCEDURE — 85730 THROMBOPLASTIN TIME PARTIAL: CPT | Mod: 91

## 2019-05-30 PROCEDURE — 93010 ELECTROCARDIOGRAM REPORT: CPT | Mod: ,,, | Performed by: INTERNAL MEDICINE

## 2019-05-30 PROCEDURE — 93005 ELECTROCARDIOGRAM TRACING: CPT | Mod: ER

## 2019-05-30 PROCEDURE — 83880 ASSAY OF NATRIURETIC PEPTIDE: CPT | Mod: ER

## 2019-05-30 PROCEDURE — 80307 DRUG TEST PRSMV CHEM ANLYZR: CPT | Mod: ER

## 2019-05-30 RX ORDER — ACETAMINOPHEN 325 MG/1
650 TABLET ORAL EVERY 6 HOURS PRN
Status: DISCONTINUED | OUTPATIENT
Start: 2019-05-30 | End: 2019-06-03

## 2019-05-30 RX ORDER — HEPARIN SODIUM,PORCINE/D5W 25000/250
12 INTRAVENOUS SOLUTION INTRAVENOUS CONTINUOUS
Status: DISCONTINUED | OUTPATIENT
Start: 2019-05-30 | End: 2019-06-02

## 2019-05-30 RX ORDER — CLOPIDOGREL BISULFATE 75 MG/1
75 TABLET ORAL DAILY
Status: DISCONTINUED | OUTPATIENT
Start: 2019-05-31 | End: 2019-06-03

## 2019-05-30 RX ORDER — METOPROLOL TARTRATE 25 MG/1
25 TABLET, FILM COATED ORAL 2 TIMES DAILY
Status: DISCONTINUED | OUTPATIENT
Start: 2019-05-30 | End: 2019-06-02

## 2019-05-30 RX ORDER — ATORVASTATIN CALCIUM 40 MG/1
40 TABLET, FILM COATED ORAL NIGHTLY
Status: DISCONTINUED | OUTPATIENT
Start: 2019-05-30 | End: 2019-06-04 | Stop reason: HOSPADM

## 2019-05-30 RX ORDER — SODIUM CHLORIDE 0.9 % (FLUSH) 0.9 %
10 SYRINGE (ML) INJECTION
Status: DISCONTINUED | OUTPATIENT
Start: 2019-05-30 | End: 2019-06-04 | Stop reason: HOSPADM

## 2019-05-30 RX ORDER — ASPIRIN 325 MG
325 TABLET ORAL
Status: COMPLETED | OUTPATIENT
Start: 2019-05-30 | End: 2019-05-30

## 2019-05-30 RX ORDER — ONDANSETRON 2 MG/ML
4 INJECTION INTRAMUSCULAR; INTRAVENOUS ONCE
Status: COMPLETED | OUTPATIENT
Start: 2019-05-30 | End: 2019-05-30

## 2019-05-30 RX ORDER — PANTOPRAZOLE SODIUM 40 MG/1
40 TABLET, DELAYED RELEASE ORAL DAILY
Status: DISCONTINUED | OUTPATIENT
Start: 2019-05-31 | End: 2019-06-04 | Stop reason: HOSPADM

## 2019-05-30 RX ORDER — ONDANSETRON 2 MG/ML
4 INJECTION INTRAMUSCULAR; INTRAVENOUS
Status: COMPLETED | OUTPATIENT
Start: 2019-05-30 | End: 2019-05-30

## 2019-05-30 RX ORDER — ASPIRIN 81 MG/1
81 TABLET ORAL DAILY
Status: DISCONTINUED | OUTPATIENT
Start: 2019-05-31 | End: 2019-06-03

## 2019-05-30 RX ORDER — MORPHINE SULFATE 4 MG/ML
4 INJECTION, SOLUTION INTRAMUSCULAR; INTRAVENOUS
Status: COMPLETED | OUTPATIENT
Start: 2019-05-30 | End: 2019-05-30

## 2019-05-30 RX ORDER — NITROGLYCERIN 0.4 MG/1
0.4 TABLET SUBLINGUAL
Status: COMPLETED | OUTPATIENT
Start: 2019-05-30 | End: 2019-05-30

## 2019-05-30 RX ADMIN — NITROGLYCERIN 1 INCH: 20 OINTMENT TOPICAL at 10:05

## 2019-05-30 RX ADMIN — ATORVASTATIN CALCIUM 40 MG: 40 TABLET, FILM COATED ORAL at 10:05

## 2019-05-30 RX ADMIN — MORPHINE SULFATE 4 MG: 4 INJECTION, SOLUTION INTRAMUSCULAR; INTRAVENOUS at 04:05

## 2019-05-30 RX ADMIN — ONDANSETRON 4 MG: 2 INJECTION INTRAMUSCULAR; INTRAVENOUS at 04:05

## 2019-05-30 RX ADMIN — NITROGLYCERIN 1 INCH: 20 OINTMENT TOPICAL at 03:05

## 2019-05-30 RX ADMIN — ACETAMINOPHEN 650 MG: 325 TABLET, FILM COATED ORAL at 10:05

## 2019-05-30 RX ADMIN — ASPIRIN 325 MG ORAL TABLET 325 MG: 325 PILL ORAL at 02:05

## 2019-05-30 RX ADMIN — HEPARIN SODIUM AND DEXTROSE 12 UNITS/KG/HR: 10000; 5 INJECTION INTRAVENOUS at 04:05

## 2019-05-30 RX ADMIN — METOPROLOL TARTRATE 25 MG: 25 TABLET ORAL at 10:05

## 2019-05-30 RX ADMIN — ONDANSETRON 4 MG: 2 INJECTION INTRAMUSCULAR; INTRAVENOUS at 10:05

## 2019-05-30 RX ADMIN — NITROGLYCERIN 0.4 MG: 0.4 TABLET, ORALLY DISINTEGRATING SUBLINGUAL at 02:05

## 2019-05-30 NOTE — ED PROVIDER NOTES
Encounter Date: 5/30/2019       History     Chief Complaint   Patient presents with    Chest Pain     left side to left arm interm for 1 week. 5/10     The history is provided by the patient.   Chest Pain   The current episode started today. Chest pain occurs intermittently. The chest pain is unchanged. At its most intense, the chest pain is at 6/10. The chest pain is currently at 5/10. The quality of the pain is described as dull. The pain radiates to the left arm. Pertinent negatives for primary symptoms include no fever, no fatigue, no syncope, no shortness of breath, no cough, no wheezing, no palpitations, no abdominal pain, no nausea, no vomiting, no dizziness and no altered mental status.   His past medical history is significant for CAD.     Review of patient's allergies indicates:  No Known Allergies  Past Medical History:   Diagnosis Date    Adult general medical examination 11/28/2016    Hyperlipidemia     Leukopenia     NSTEMI (non-ST elevated myocardial infarction)      Past Surgical History:   Procedure Laterality Date    CATHETERIZATION, HEART, LEFT Left 5/24/2019    Performed by Shaheen Rich MD at [facility] CATH LAB     Family History   Problem Relation Age of Onset    Heart disease Paternal Grandfather     Hyperlipidemia Paternal Grandfather      Social History     Tobacco Use    Smoking status: Never Smoker    Smokeless tobacco: Never Used   Substance Use Topics    Alcohol use: Yes     Comment: social    Drug use: No     Review of Systems   Constitutional: Negative for fatigue and fever.   Respiratory: Negative for cough, shortness of breath and wheezing.    Cardiovascular: Positive for chest pain. Negative for palpitations and syncope.   Gastrointestinal: Negative for abdominal pain, nausea and vomiting.   Neurological: Negative for dizziness.   All other systems reviewed and are negative.      Physical Exam     Initial Vitals [05/30/19 1423]   BP Pulse Resp Temp SpO2   (!) 144/83 70  20 99.3 °F (37.4 °C) 100 %      MAP       --         Vitals:    05/30/19 1531 05/30/19 1546 05/30/19 1601 05/30/19 1616   BP: 126/71 128/77 131/76 126/77   Pulse: 67 69 68 68   Resp: 20 20 20 20   Temp: 99 °F (37.2 °C) 98.9 °F (37.2 °C)     TempSrc: Oral Oral     SpO2: 100% 100% 100% 99%   Weight:       Height:        05/30/19 1631 05/30/19 1646 05/30/19 1700 05/30/19 1701   BP: 123/60 122/62  116/61   Pulse: 69 67  69   Resp: 20 20 20 20   Temp: 98.6 °F (37 °C)   98.4 °F (36.9 °C)   TempSrc: Oral   Oral   SpO2: 99% 99%  99%   Weight:       Height:        05/30/19 1716 05/30/19 1731 05/30/19 1746 05/30/19 1801   BP: (!) 115/56 (!) 122/58 (!) 111/53 (!) 114/56   Pulse: 71 72 69 70   Resp: 17 16 19 20   Temp:    98.8 °F (37.1 °C)   TempSrc:    Oral   SpO2: 98% 98% 98% 98%   Weight:       Height:        05/30/19 1816 05/30/19 1831 05/30/19 1902   BP: (!) 113/58 (!) 112/57 (!) 97/48   Pulse: 71 69 71   Resp: 20 20 20   Temp:   98.9 °F (37.2 °C)   TempSrc:   Oral   SpO2: 99% 99% 100%   Weight:      Height:            Physical Exam    Nursing note and vitals reviewed.  Constitutional: He appears well-developed and well-nourished. He is not diaphoretic. No distress.   HENT:   Head: Normocephalic and atraumatic.   Mouth/Throat: Oropharynx is clear and moist.   Eyes: Conjunctivae and EOM are normal. Pupils are equal, round, and reactive to light. Right eye exhibits no discharge. Left eye exhibits no discharge.   Neck: Normal range of motion. Neck supple.   Cardiovascular: Normal rate and regular rhythm. Exam reveals no gallop and no friction rub.    No murmur heard.  Pulmonary/Chest: Breath sounds normal. No respiratory distress. He exhibits no tenderness.   Abdominal: Soft. He exhibits no distension. There is no tenderness. There is no rebound and no guarding.   Musculoskeletal: Normal range of motion. He exhibits no edema or tenderness.   Neurological: He is alert and oriented to person, place, and time.   Skin: Skin is  warm and dry.   Psychiatric: He has a normal mood and affect. Thought content normal.       ED Course   Critical Care  Date/Time: 5/30/2019 6:10 PM  Performed by: Ivan Joseph MD  Authorized by: Ivan Joseph MD   Direct patient critical care time: 19 minutes  Additional history critical care time: 4 minutes  Ordering / reviewing critical care time: 10 minutes  Documentation critical care time: 9 minutes  Consulting other physicians critical care time: 8 minutes  Total critical care time (exclusive of procedural time) : 50 minutes  Critical care time was exclusive of separately billable procedures and treating other patients.  Critical care was necessary to treat or prevent imminent or life-threatening deterioration of the following conditions: circulatory failure.  Critical care was time spent personally by me on the following activities: discussions with consultants, examination of patient, evaluation of patient's response to treatment, ordering and review of laboratory studies, ordering and review of radiographic studies, ordering and performing treatments and interventions, obtaining history from patient or surrogate, re-evaluation of patient's condition, pulse oximetry, review of old charts and development of treatment plan with patient or surrogate.  Subsequent provider of critical care: I assumed direction of critical care for this patient from another provider of my specialty.        Labs Reviewed   TROPONIN I - Abnormal; Notable for the following components:       Result Value    Troponin I 4.298 (*)     All other components within normal limits   TROPONIN I - Abnormal; Notable for the following components:    Troponin I 6.819 (*)     All other components within normal limits   CBC W/ AUTO DIFFERENTIAL   COMPREHENSIVE METABOLIC PANEL   B-TYPE NATRIURETIC PEPTIDE   APTT   PROTIME-INR   PROTIME-INR   APTT   DRUG SCREEN PANEL, URINE EMERGENCY     EKG Readings: (Independently Interpreted)   Initial  Reading: No STEMI. Rhythm: Normal Sinus Rhythm. Heart Rate: 72. Ectopy: No Ectopy. ST Segment Depression: II. T Waves Flipped: III and AVF. Axis: Right Axis Deviation.     ECG Results          EKG 12-lead (In process)  Result time 05/30/19 14:51:09    In process by Interface, Lab In [facility] (05/30/19 14:51:09)                 Narrative:    Test Reason : R07.9,    Vent. Rate : 072 BPM     Atrial Rate : 072 BPM     P-R Int : 168 ms          QRS Dur : 090 ms      QT Int : 366 ms       P-R-T Axes : 065 106 -40 degrees     QTc Int : 400 ms    Normal sinus rhythm  Rightward axis  T wave abnormality, consider inferior ischemia  Abnormal ECG  When compared with ECG of 28-MAY-2019 09:30,  ST no longer elevated in Anterior leads    Referred By: [email or web address]   SELF           Confirmed By:                             Imaging Results          X-Ray Chest AP Portable (Final result)  Result time 05/30/19 14:57:30    Final result by ISRRAEL Banks Sr., MD (05/30/19 14:57:30)                 Impression:      Normal study.      Electronically signed by: Flip Banks MD  Date:    05/30/2019  Time:    14:57             Narrative:    EXAMINATION:  XR CHEST AP PORTABLE    CLINICAL HISTORY:  Chest Pain;    COMPARISON:  05/23/2019    FINDINGS:  The size of the heart is normal. The lungs are clear. There is no pneumothorax.  The costophrenic angles are sharp.                              Catheterization Results (extracted from report)     Diagnostic:          Patient has a right dominant coronary artery.        The coronary vessels are all normal.        - Left Main Coronary Artery:             The LM is normal. There is ADOLFO 3 flow.     - Left Anterior Descending Artery:             The LAD is normal. There is ADOLFO 3 flow.     - Left Circumflex Artery:             The LCX is normal. There is ADOLFO 3 flow.     - Right Coronary Artery:             The RCA is normal. There is ADOLFO 3 flow.     Medical Decision Making:   ED Management:  The  patient was received from the off-going emergency room physician Dr Kitchen at 6:00PM.  All pertinent details presently available from the patient encounter were discussed along with the expected plan for disposition.      Per Dr. Kitchen, the patient was discussed with the on-call cardiologist Dr. Aguayo who recommends heparinization and admission for cardiac monitoring pending his evaluation.    On repeat assessment, the patient is now comfortable following administration of nitroglycerin ointment and remains hemodynamically stable.    All historical, clinical, radiographic, and laboratory findings were reviewed with the patient/family in detail along with the indications for transport to the facility in [place] in order to receive cardiac monitoring and cardiology consultation.  All remaining questions and concerns were addressed at this time and the patient/family communicates understanding and agrees to proceed accordingly.  Similarly, all pertinent details of the encounter were discussed with Dr. Cho per MATT Perera who agrees to receive the patient at [facility] for further care as outlined above.  [facility] secondary to a need for ongoing cardiac monitoring en route.  Ivan Joseph MD  7:39 PM    I have placed a secure chat message to the cardiologist now on-call Dr. Bojorquez given the substantial rise in his troponin from 4.3 to 6.8 on the 3 hr follow-up labs.  We are currently awaiting call back to discuss any further recommendations.  Ivan Joseph MD  7:43 PM    Discussed all pertinent findings with Dr. Bojorquez.  No further recommendations at this time.                        Clinical Impression:       ICD-10-CM ICD-9-CM   1. NSTEMI (non-ST elevated myocardial infarction) I21.4 410.70   2. Chest pain R07.9 786.50                                Ivan Joseph MD  05/30/19 1948       Ivan Joseph MD  05/30/19 1956

## 2019-05-30 NOTE — ED NOTES
Patient reports nitro not really helping his pain. Denies headache reports chest pain 5/10 MD aware.

## 2019-05-30 NOTE — ED NOTES
Patient in NAD talking with family. Skin warm and dry resp even and unlabroed. Sinus rhythm remains on cardiac monitor. Patient aware they are awaiting for bed to be assigned. IV sites WNL will continue to monitor.

## 2019-05-30 NOTE — ED NOTES
Patient to ER with the complaint of chest pain. [facility] Today patient reports he woke up feeling really good then he took his daily meds and began to feel bad. He relates his chest pain to taking his meds. He is on lopressor and plavix. SR SUMIT noted on cardiac monitor rates his pain 5/10 at present. Denies shortness of breath denies nausea.. Patient has daughter at bedside at present. Will continue to monitor.

## 2019-05-31 ENCOUNTER — TELEPHONE (OUTPATIENT)
Dept: CARDIOLOGY | Facility: CLINIC | Age: 34
End: 2019-05-31

## 2019-05-31 PROBLEM — I10 HYPERTENSION: Status: ACTIVE | Noted: 2019-05-31

## 2019-05-31 PROBLEM — R94.31 ABNORMAL ECG: Status: ACTIVE | Noted: 2019-05-31

## 2019-05-31 LAB
ANION GAP SERPL CALC-SCNC: 8 MMOL/L (ref 8–16)
APTT BLDCRRT: 43.5 SEC (ref 21–32)
APTT BLDCRRT: 56.7 SEC (ref 21–32)
APTT BLDCRRT: 74.7 SEC (ref 21–32)
BASOPHILS # BLD AUTO: 0.02 K/UL (ref 0–0.2)
BASOPHILS NFR BLD: 0.3 % (ref 0–1.9)
BUN SERPL-MCNC: 12 MG/DL (ref 6–20)
CALCIUM SERPL-MCNC: 9.5 MG/DL (ref 8.7–10.5)
CHLORIDE SERPL-SCNC: 103 MMOL/L (ref 95–110)
CO2 SERPL-SCNC: 27 MMOL/L (ref 23–29)
CREAT SERPL-MCNC: 1.1 MG/DL (ref 0.5–1.4)
DIASTOLIC DYSFUNCTION: NO
DIFFERENTIAL METHOD: ABNORMAL
EOSINOPHIL # BLD AUTO: 0.1 K/UL (ref 0–0.5)
EOSINOPHIL NFR BLD: 2 % (ref 0–8)
ERYTHROCYTE [DISTWIDTH] IN BLOOD BY AUTOMATED COUNT: 13.6 % (ref 11.5–14.5)
EST. GFR  (AFRICAN AMERICAN): >60 ML/MIN/1.73 M^2
EST. GFR  (NON AFRICAN AMERICAN): >60 ML/MIN/1.73 M^2
ESTIMATED PA SYSTOLIC PRESSURE: 15.25
GLUCOSE SERPL-MCNC: 99 MG/DL (ref 70–110)
HCT VFR BLD AUTO: 40 % (ref 40–54)
HGB BLD-MCNC: 13 G/DL (ref 14–18)
INR PPP: 1 (ref 0.8–1.2)
LYMPHOCYTES # BLD AUTO: 1.9 K/UL (ref 1–4.8)
LYMPHOCYTES NFR BLD: 28.3 % (ref 18–48)
MAGNESIUM SERPL-MCNC: 2 MG/DL (ref 1.6–2.6)
MCH RBC QN AUTO: 28.1 PG (ref 27–31)
MCHC RBC AUTO-ENTMCNC: 32.5 G/DL (ref 32–36)
MCV RBC AUTO: 87 FL (ref 82–98)
MONOCYTES # BLD AUTO: 0.4 K/UL (ref 0.3–1)
MONOCYTES NFR BLD: 6.5 % (ref 4–15)
NEUTROPHILS # BLD AUTO: 4.2 K/UL (ref 1.8–7.7)
NEUTROPHILS NFR BLD: 62.9 % (ref 38–73)
PHOSPHATE SERPL-MCNC: 4 MG/DL (ref 2.7–4.5)
PLATELET # BLD AUTO: 205 K/UL (ref 150–350)
PMV BLD AUTO: 10.1 FL (ref 9.2–12.9)
POTASSIUM SERPL-SCNC: 4.2 MMOL/L (ref 3.5–5.1)
PROTHROMBIN TIME: 10.9 SEC (ref 9–12.5)
RBC # BLD AUTO: 4.62 M/UL (ref 4.6–6.2)
RETIRED EF AND QEF - SEE NOTES: 55 (ref 55–65)
SODIUM SERPL-SCNC: 138 MMOL/L (ref 136–145)
TROPONIN I SERPL DL<=0.01 NG/ML-MCNC: 3.79 NG/ML (ref 0–0.03)
TSH SERPL DL<=0.005 MIU/L-ACNC: 2.01 UIU/ML (ref 0.4–4)
WBC # BLD AUTO: 6.6 K/UL (ref 3.9–12.7)

## 2019-05-31 PROCEDURE — 85730 THROMBOPLASTIN TIME PARTIAL: CPT | Mod: 91

## 2019-05-31 PROCEDURE — 25000003 PHARM REV CODE 250: Performed by: NURSE PRACTITIONER

## 2019-05-31 PROCEDURE — 63600175 PHARM REV CODE 636 W HCPCS: Performed by: EMERGENCY MEDICINE

## 2019-05-31 PROCEDURE — 83735 ASSAY OF MAGNESIUM: CPT

## 2019-05-31 PROCEDURE — 25500020 PHARM REV CODE 255: Performed by: INTERNAL MEDICINE

## 2019-05-31 PROCEDURE — 85025 COMPLETE CBC W/AUTO DIFF WBC: CPT

## 2019-05-31 PROCEDURE — 25000003 PHARM REV CODE 250: Performed by: EMERGENCY MEDICINE

## 2019-05-31 PROCEDURE — 85730 THROMBOPLASTIN TIME PARTIAL: CPT

## 2019-05-31 PROCEDURE — 25000003 PHARM REV CODE 250: Performed by: INTERNAL MEDICINE

## 2019-05-31 PROCEDURE — 21400001 HC TELEMETRY ROOM

## 2019-05-31 PROCEDURE — 85610 PROTHROMBIN TIME: CPT

## 2019-05-31 PROCEDURE — 93306 TTE W/DOPPLER COMPLETE: CPT | Mod: 26,,, | Performed by: INTERNAL MEDICINE

## 2019-05-31 PROCEDURE — 93306 TTE W/DOPPLER COMPLETE: CPT

## 2019-05-31 PROCEDURE — 84100 ASSAY OF PHOSPHORUS: CPT

## 2019-05-31 PROCEDURE — 36415 COLL VENOUS BLD VENIPUNCTURE: CPT

## 2019-05-31 PROCEDURE — 93306 2D ECHO WITH COLOR FLOW DOPPLER: ICD-10-PCS | Mod: 26,,, | Performed by: INTERNAL MEDICINE

## 2019-05-31 PROCEDURE — 84443 ASSAY THYROID STIM HORMONE: CPT

## 2019-05-31 PROCEDURE — 99255 PR INITIAL INPATIENT CONSULT,LEVL V: ICD-10-PCS | Mod: 25,,, | Performed by: INTERNAL MEDICINE

## 2019-05-31 PROCEDURE — 25000003 PHARM REV CODE 250: Performed by: CLINIC/CENTER

## 2019-05-31 PROCEDURE — 99255 IP/OBS CONSLTJ NEW/EST HI 80: CPT | Mod: 25,,, | Performed by: INTERNAL MEDICINE

## 2019-05-31 PROCEDURE — 84484 ASSAY OF TROPONIN QUANT: CPT

## 2019-05-31 PROCEDURE — 80048 BASIC METABOLIC PNL TOTAL CA: CPT

## 2019-05-31 RX ORDER — AMLODIPINE BESYLATE 5 MG/1
5 TABLET ORAL DAILY
Status: DISCONTINUED | OUTPATIENT
Start: 2019-05-31 | End: 2019-06-04 | Stop reason: HOSPADM

## 2019-05-31 RX ORDER — RANOLAZINE 500 MG/1
500 TABLET, EXTENDED RELEASE ORAL 2 TIMES DAILY
Status: DISCONTINUED | OUTPATIENT
Start: 2019-05-31 | End: 2019-06-02

## 2019-05-31 RX ORDER — SODIUM CHLORIDE 9 MG/ML
INJECTION, SOLUTION INTRAVENOUS CONTINUOUS
Status: DISCONTINUED | OUTPATIENT
Start: 2019-05-31 | End: 2019-06-01

## 2019-05-31 RX ADMIN — AMLODIPINE BESYLATE 5 MG: 5 TABLET ORAL at 12:05

## 2019-05-31 RX ADMIN — SODIUM CHLORIDE: 0.9 INJECTION, SOLUTION INTRAVENOUS at 11:05

## 2019-05-31 RX ADMIN — NITROGLYCERIN 1 INCH: 20 OINTMENT TOPICAL at 10:05

## 2019-05-31 RX ADMIN — CLOPIDOGREL BISULFATE 75 MG: 75 TABLET ORAL at 08:05

## 2019-05-31 RX ADMIN — NITROGLYCERIN 1 INCH: 20 OINTMENT TOPICAL at 05:05

## 2019-05-31 RX ADMIN — NITROGLYCERIN 1 INCH: 20 OINTMENT TOPICAL at 06:05

## 2019-05-31 RX ADMIN — HEPARIN SODIUM AND DEXTROSE 10 UNITS/KG/HR: 10000; 5 INJECTION INTRAVENOUS at 07:05

## 2019-05-31 RX ADMIN — ACETAMINOPHEN 650 MG: 325 TABLET, FILM COATED ORAL at 11:05

## 2019-05-31 RX ADMIN — PANTOPRAZOLE SODIUM 40 MG: 40 TABLET, DELAYED RELEASE ORAL at 08:05

## 2019-05-31 RX ADMIN — METOPROLOL TARTRATE 25 MG: 25 TABLET ORAL at 08:05

## 2019-05-31 RX ADMIN — IOHEXOL 100 ML: 350 INJECTION, SOLUTION INTRAVENOUS at 11:05

## 2019-05-31 RX ADMIN — ASPIRIN 81 MG: 81 TABLET, COATED ORAL at 08:05

## 2019-05-31 RX ADMIN — RANOLAZINE 500 MG: 500 TABLET, FILM COATED, EXTENDED RELEASE ORAL at 08:05

## 2019-05-31 RX ADMIN — ATORVASTATIN CALCIUM 40 MG: 40 TABLET, FILM COATED ORAL at 08:05

## 2019-05-31 RX ADMIN — SODIUM CHLORIDE: 0.9 INJECTION, SOLUTION INTRAVENOUS at 09:05

## 2019-05-31 RX ADMIN — RANOLAZINE 500 MG: 500 TABLET, FILM COATED, EXTENDED RELEASE ORAL at 12:05

## 2019-05-31 NOTE — CONSULTS
[facility] -   Cardiology  Consult Note    Patient Name: Oumar Mccarthy  MRN: [id number]  Admission Date: 5/30/2019  Hospital Length of Stay: 1 days  Code Status: Full Code   Attending Provider: Rodney Quezada, *   Consulting Provider: Richmond Aguayo MD  Primary Care Physician: Leana Troy MD  Principal Problem:NSTEMI (non-ST elevated myocardial infarction)    Patient information was obtained from patient, spouse/SO, past medical records and ER records.     Inpatient consult to Cardiology  Consult performed by: Richmond Aguayo MD  Consult ordered by: Ivan Joseph MD  Reason for consult: CHEST PAIN        Subjective:     Chief Complaint:  CHEST PAIN     HPI:   Pt presents with chest pain.  Pt admitted one week ago for NSTEMI and had LHC done by Dr. Rich 7 days ago and was angiographically normal, normal LVEF.  He has been having chest pain sxs over last 1.5 weeks, intermittently, and yesterday got worse again so went to [facility] ER.  ecg over last week shows NSR, inferior st-t abnl.    Consideration given to vasospasm and demand ischemia last week after cath was normal.  Troponin elevated in ER, now trending down.  Still with intermittent cp.  No acute dyspnea.    Nonsmoker.  No drug use.    Past Medical History:   Diagnosis Date    Adult general medical examination 11/28/2016    Hyperlipidemia     Leukopenia     NSTEMI (non-ST elevated myocardial infarction)        Past Surgical History:   Procedure Laterality Date    CATHETERIZATION, HEART, LEFT Left 5/24/2019    Performed by Shaheen Rich MD at [facility] CATH LAB       Review of patient's allergies indicates:  No Known Allergies    No current facility-administered medications on file prior to encounter.      Current Outpatient Medications on File Prior to Encounter   Medication Sig    aspirin (ECOTRIN) 81 MG EC tablet Take 1 tablet (81 mg total) by mouth once daily.    atorvastatin (LIPITOR) 40 MG tablet Take 1 tablet  (40 mg total) by mouth every evening.    clopidogrel (PLAVIX) 75 mg tablet Take 1 tablet (75 mg total) by mouth once daily.    metoprolol tartrate (LOPRESSOR) 25 MG tablet Take 1 tablet (25 mg total) by mouth 2 (two) times daily.    nitroGLYCERIN (NITROSTAT) 0.4 MG SL tablet Place 1 tablet (0.4 mg total) under the tongue every 5 (five) minutes as needed for Chest pain (do not exceed 3 doses).     Family History     Problem Relation (Age of Onset)    Heart disease Paternal Grandfather    Hyperlipidemia Paternal Grandfather        Tobacco Use    Smoking status: Never Smoker    Smokeless tobacco: Never Used   Substance and Sexual Activity    Alcohol use: Yes     Comment: social    Drug use: No    Sexual activity: Yes     Partners: Female     Review of Systems   Constitution: Negative.   HENT: Negative.    Eyes: Negative.    Cardiovascular: Positive for chest pain.   Respiratory: Negative.    Endocrine: Negative.    Hematologic/Lymphatic: Negative.    Skin: Negative.    Musculoskeletal: Negative.    Gastrointestinal: Negative.    Genitourinary: Negative.    Neurological: Negative.    Psychiatric/Behavioral: Negative.    Allergic/Immunologic: Negative.      Objective:     Vital Signs (Most Recent):  Temp: 98.3 °F (36.8 °C) (05/31/19 1127)  Pulse: 65 (05/31/19 1127)  Resp: 16 (05/31/19 0941)  BP: 122/75 (05/31/19 1127)  SpO2: 100 % (05/31/19 1127) Vital Signs (24h Range):  Temp:  [98.1 °F (36.7 °C)-99.3 °F (37.4 °C)] 98.3 °F (36.8 °C)  Pulse:  [58-82] 65  Resp:  [12-23] 16  SpO2:  [98 %-100 %] 100 %  BP: ()/(37-83) 122/75     Weight: 99.4 kg (219 lb 2.2 oz)  Body mass index is 28.14 kg/m².    SpO2: 100 %  O2 Device (Oxygen Therapy): room air      Intake/Output Summary (Last 24 hours) at 5/31/2019 1240  Last data filed at 5/30/2019 2028  Gross per 24 hour   Intake --   Output 300 ml   Net -300 ml       Lines/Drains/Airways     Peripheral Intravenous Line                 Peripheral IV - Single Lumen 05/30/19  1425 18 G Right Antecubital less than 1 day         Peripheral IV - Single Lumen 05/30/19 1600 20 G Right Hand less than 1 day                Physical Exam   Constitutional: He is oriented to person, place, and time. He appears well-developed and well-nourished.   HENT:   Head: Normocephalic.   Neck: Normal range of motion. Neck supple. Normal carotid pulses, no hepatojugular reflux and no JVD present. Carotid bruit is not present. No thyromegaly present.   Cardiovascular: Normal rate, regular rhythm, S1 normal and S2 normal. PMI is not displaced. Exam reveals no S3, no S4, no distant heart sounds, no friction rub, no midsystolic click and no opening snap.   No murmur heard.  Pulses:       Radial pulses are 2+ on the right side, and 2+ on the left side.   Pulmonary/Chest: Effort normal and breath sounds normal. He has no wheezes. He has no rales.   Abdominal: Soft. Bowel sounds are normal. He exhibits no distension, no abdominal bruit, no ascites and no mass. There is no tenderness.   Musculoskeletal: He exhibits no edema.   Neurological: He is alert and oriented to person, place, and time.   Skin: Skin is warm.   Psychiatric: He has a normal mood and affect. His behavior is normal.   Nursing note and vitals reviewed.      Significant Labs:   BMP:   Recent Labs   Lab 05/30/19  1430 05/31/19  0404   GLU 81 99    138   K 3.7 4.2    103   CO2 28 27   BUN 14 12   CREATININE 1.2 1.1   CALCIUM 10.0 9.5   MG  --  2.0   , CMP   Recent Labs   Lab 05/30/19  1430 05/31/19  0404    138   K 3.7 4.2    103   CO2 28 27   GLU 81 99   BUN 14 12   CREATININE 1.2 1.1   CALCIUM 10.0 9.5   PROT 7.8  --    ALBUMIN 4.0  --    BILITOT 0.6  --    ALKPHOS 65  --    AST 35  --    ALT 25  --    ANIONGAP 9 8   ESTGFRAFRICA >60.0 >60   EGFRNONAA >60.0 >60   , CBC   Recent Labs   Lab 05/30/19  1430 05/31/19  0404   WBC 5.13 6.60   HGB 14.4 13.0*   HCT 44.0 40.0    205   , INR   Recent Labs   Lab 05/30/19  1430  05/31/19  0404   INR 1.0 1.0   , Lipid Panel No results for input(s): CHOL, HDL, LDLCALC, TRIG, CHOLHDL in the last 48 hours. and Troponin   Recent Labs   Lab 05/30/19  1749 05/30/19  2235 05/31/19  0404   TROPONINI 6.819* 6.394* 3.790*       Significant Imaging: Echocardiogram:   2D echo with color flow doppler:   Results for orders placed or performed during the hospital encounter of 05/30/19   2D echo with color flow doppler   Result Value Ref Range    QEF 55 55 - 65    Diastolic Dysfunction No     Est. PA Systolic Pressure 15.25      Assessment and Plan:     RECURRENT CP SXS WITH ELEVATED TROPONIN LEVELS AGAIN.  CATH 7 DAYS AGO -- NORMAL CORONARY ARTERIES.  NEED TO EVALUATE FOR OTHER POSSIBLE ETIOLOGIES OF CP, ELEVATED TROPONIN.   CHECK CTA CHEST TO EVALUATE AORTA, EXCLUDE PE,  TREAT FOR CORONARY VASOSPASM.  ADD AMLODIPINE.    TOPICAL NITRATES TODAY -- SWITCH TO IMDUR SOON.  CONTINUE IV HEPARIN GTT TODAY.  REPEAT ECHO TODAY TO REEVALUATE LVEF, ASSESS VALVES.        * NSTEMI (non-ST elevated myocardial infarction)  See management plan detailed above.     Abnormal ECG  See management plan detailed above.     Hypertension  See management plan detailed above.         VTE Risk Mitigation (From admission, onward)        Ordered     IP VTE HIGH RISK PATIENT  Once      05/30/19 2144     Place SARY hose  Until discontinued      05/30/19 2144     Place sequential compression device  Until discontinued      05/30/19 2144     Reason for No Pharmacological VTE Prophylaxis  Once      05/30/19 2144     heparin 25,000 units in dextrose 5% 250 mL (100 units/mL) infusion LOW INTENSITY nomogram - [facility]  Continuous      05/30/19 1546     heparin 25,000 units in dextrose 5% (100 units/ml) IV bolus from bag - ADDITIONAL PRN BOLUS - 60 units/kg (max bolus 4000 units)  As needed (PRN)      05/30/19 1546     heparin 25,000 units in dextrose 5% (100 units/ml) IV bolus from bag - ADDITIONAL PRN BOLUS - 30 units/kg (max bolus 4000 units)  As  needed (PRN)      05/30/19 1545          Thank you for your consult.    Richmond Aguayo MD  Cardiology   [facility]

## 2019-05-31 NOTE — TELEPHONE ENCOUNTER
Spoke with pt's wife notified her the information states she will come  paperwork today from [facility].

## 2019-05-31 NOTE — ASSESSMENT & PLAN NOTE
Statin  Had recent lipid and a1c  Further evaluation/diagnostics/interventions/consults pending course

## 2019-05-31 NOTE — ASSESSMENT & PLAN NOTE
-Admit on heparin drip.  -Troponin Result in Er 6.819  -EKG in ED reviewed  -Serial enzymes  - Given in   -ASA 81mg daily  -Plavix, statin  -Consult to cardiology  -Patient makes mention of having a possible Protein S deficiency in family history.  Consider further heme workup pending course  Further evaluation/diagnostics/interventions/consults pending course

## 2019-05-31 NOTE — ASSESSMENT & PLAN NOTE
Continue home meds  Monitor tends  Further evaluation/diagnostics/interventions/consults pending course

## 2019-05-31 NOTE — HPI
33 year old male presents as a trasnfer from outside freestanding ER for complaint of Chest Pain. Patient evaluated in the Er. Troponin 6.819. Started on Heparin drip. HM consulted, patient accepted and transferred for admission. Patient seen and examined. Patient reports pain intermittent to left side with radiation to left arm over the past week. Patient reports at this time currently pain free. No modifying factors. No associated symptoms. Prior treatment: ASA, also patient seen/admitted for similar complaint with having a normal heart cath 5/24/19. See chart review for more details.

## 2019-05-31 NOTE — PROGRESS NOTES
[facility] Hospital Medicine  Progress Note    Patient Name: Oumar Mccarthy  MRN: [id number]  Patient Class: IP- Inpatient   Admission Date: 5/30/2019  Length of Stay: 1 days  Attending Physician: Rodney Quezada, *  Primary Care Provider: Leana Troy MD        Subjective:     Principal Problem:NSTEMI (non-ST elevated myocardial infarction)    HPI:  33 year old male presents as a trasnfer from outside freestanding ER for complaint of Chest Pain. Patient evaluated in the Er. Troponin 6.819. Started on Heparin drip. HM consulted, patient accepted and transferred for admission. Patient seen and examined. Patient reports pain intermittent to left side with radiation to left arm over the past week. Patient reports at this time currently pain free. No modifying factors. No associated symptoms. Prior treatment: ASA, also patient seen/admitted for similar complaint with having a normal heart cath 5/24/19. See chart review for more details.     Hospital Course:  5/31 Pt was seen and examined at bedside . He cont with mild chest pain .Cardiology was consulted and recommend to cont Heparin drip . The  Chest CTA did not show any acute finding / The TTE did not show any acute finding .     Interval History:     Review of Systems   Constitutional: Negative for chills, diaphoresis, fatigue and fever.   HENT: Negative for congestion, sore throat and voice change.    Eyes: Negative for photophobia and visual disturbance.   Respiratory: Negative for cough, shortness of breath, wheezing and stridor.    Cardiovascular: Positive for chest pain. Negative for leg swelling.   Gastrointestinal: Negative for abdominal distention, abdominal pain, constipation, diarrhea, nausea and vomiting.   Endocrine: Negative for polydipsia, polyphagia and polyuria.   Genitourinary: Negative for difficulty urinating, dysuria, flank pain, testicular pain and urgency.   Musculoskeletal: Negative for back pain, joint swelling,  neck pain and neck stiffness.   Skin: Negative for color change and rash.   Allergic/Immunologic: Negative for immunocompromised state.   Neurological: Negative for dizziness, syncope, weakness, numbness and headaches.   Hematological: Does not bruise/bleed easily.   Psychiatric/Behavioral: Negative for agitation, behavioral problems and confusion.     Objective:     Vital Signs (Most Recent):  Temp: 98.3 °F (36.8 °C) (05/31/19 1127)  Pulse: 65 (05/31/19 1127)  Resp: 16 (05/31/19 0941)  BP: 122/75 (05/31/19 1127)  SpO2: 100 % (05/31/19 1127) Vital Signs (24h Range):  Temp:  [98.1 °F (36.7 °C)-99 °F (37.2 °C)] 98.3 °F (36.8 °C)  Pulse:  [58-81] 65  Resp:  [12-23] 16  SpO2:  [98 %-100 %] 100 %  BP: ()/(37-82) 122/75     Weight: 99.4 kg (219 lb 2.2 oz)  Body mass index is 28.14 kg/m².    Intake/Output Summary (Last 24 hours) at 5/31/2019 1434  Last data filed at 5/31/2019 1205  Gross per 24 hour   Intake 0 ml   Output 400 ml   Net -400 ml      Physical Exam   Constitutional: He is oriented to person, place, and time. He appears well-developed and well-nourished. No distress.   HENT:   Head: Normocephalic and atraumatic.   Nose: Nose normal.   Eyes: Pupils are equal, round, and reactive to light. Conjunctivae and EOM are normal. No scleral icterus.   Neck: Normal range of motion. Neck supple. No tracheal deviation present.   Cardiovascular: Normal rate, regular rhythm, normal heart sounds and intact distal pulses.   No murmur heard.  Pulmonary/Chest: Effort normal and breath sounds normal. No stridor. No respiratory distress. He has no wheezes. He has no rales.   Abdominal: Soft. Bowel sounds are normal. He exhibits no distension. There is no tenderness. There is no guarding.   Musculoskeletal: Normal range of motion. He exhibits no edema or deformity.   Neurological: He is alert and oriented to person, place, and time. No cranial nerve deficit.   Skin: Skin is warm and dry. Capillary refill takes less than 2  seconds. No rash noted. He is not diaphoretic.   Psychiatric: He has a normal mood and affect. His behavior is normal. Judgment and thought content normal.   Nursing note and vitals reviewed.      Significant Labs: All pertinent labs within the past 24 hours have been reviewed.    Significant Imaging: I have reviewed all pertinent imaging results/findings within the past 24 hours.    Assessment/Plan:      * NSTEMI (non-ST elevated myocardial infarction)  -Cardiology  On board  -Chest CTA did not show any acute abnormalities   -Per cardiology no [facility] at this time   - The TTE did not show any  acute finding         Abnormal ECG        Hypertension  Continue home meds  Monitor tends          Hyperlipidemia  Statin          VTE Risk Mitigation (From admission, onward)        Ordered     IP VTE HIGH RISK PATIENT  Once      05/30/19 2144     Place SARY hose  Until discontinued      05/30/19 2144     Place sequential compression device  Until discontinued      05/30/19 2144     Reason for No Pharmacological VTE Prophylaxis  Once      05/30/19 2144     heparin 25,000 units in dextrose 5% 250 mL (100 units/mL) infusion LOW INTENSITY nomogram - [facility]  Continuous      05/30/19 1546     heparin 25,000 units in dextrose 5% (100 units/ml) IV bolus from bag - ADDITIONAL PRN BOLUS - 60 units/kg (max bolus 4000 units)  As needed (PRN)      05/30/19 1546     heparin 25,000 units in dextrose 5% (100 units/ml) IV bolus from bag - ADDITIONAL PRN BOLUS - 30 units/kg (max bolus 4000 units)  As needed (PRN)      05/30/19 1546              Rodney Quezada MD  Department of Hospital Medicine   [facility]

## 2019-05-31 NOTE — EICU
[facility]   Critical Care - note    Patient Name: Oumar Mccarthy  MRN: [id number]  Admission Date: 5/30/2019  Hospital Length of Stay: 0 days  Code Status: Full Code  Attending Physician: Carlos Cho MD   Primary Care Provider: Leana Troy MD   Principal Problem: <principal problem not specified>    Subjective:     HPI:    34 yo male c/o CP started today, radiates to L arm and is 6/10    PMX: CAD, HLD, NSTEMI,     Recent LHC 5/24/19 (reported normal)    Troponin 4.3 (2:30 pm) ---> 6.8 (5:50pm)    ECG May 28  Twave inversions II, F, ST elevation V1, V2  ECG May 30 2:30 pm  Twave inversions II, F    CXR normal      I viewed the pt at 10:05 pm     116/68, 65 NSR, 100% sat, R<R 18           Past Medical History:   Diagnosis Date    Adult general medical examination 11/28/2016    Hyperlipidemia     Leukopenia     NSTEMI (non-ST elevated myocardial infarction)        Past Surgical History:   Procedure Laterality Date    CATHETERIZATION, HEART, LEFT Left 5/24/2019    Performed by Shaheen Rich MD at [facility] CATH LAB       Review of patient's allergies indicates:  No Known Allergies    Family History     Problem Relation (Age of Onset)    Heart disease Paternal Grandfather    Hyperlipidemia Paternal Grandfather        Tobacco Use    Smoking status: Never Smoker    Smokeless tobacco: Never Used   Substance and Sexual Activity    Alcohol use: Yes     Comment: social    Drug use: No    Sexual activity: Yes     Partners: Female      Review of Systems  Objective:     Vital Signs (Most Recent):  Temp: 98.4 °F (36.9 °C) (05/30/19 2016)  Pulse: 67 (05/30/19 2016)  Resp: 20 (05/30/19 2016)  BP: (!) 108/55 (05/30/19 2016)  SpO2: 99 % (05/30/19 2016) Vital Signs (24h Range):  Temp:  [98.4 °F (36.9 °C)-99.3 °F (37.4 °C)] 98.4 °F (36.9 °C)  Pulse:  [67-82] 67  Resp:  [16-20] 20  SpO2:  [98 %-100 %] 99 %  BP: ()/(48-83) 108/55     Weight: 99.4 kg (219 lb 2.2 oz)  Body mass index is  28.14 kg/m².      Intake/Output Summary (Last 24 hours) at 5/30/2019 2153  Last data filed at 5/30/2019 2028  Gross per 24 hour   Intake --   Output 300 ml   Net -300 ml       Physical Exam    Vents:       Lines/Drains/Airways     Peripheral Intravenous Line                 Peripheral IV - Single Lumen 05/30/19 1425 18 G Right Antecubital less than 1 day         Peripheral IV - Single Lumen 05/30/19 1600 20 G Right Hand less than 1 day                Significant Labs:    CBC/Anemia Profile:  Recent Labs   Lab 05/30/19  1430   WBC 5.13   HGB 14.4   HCT 44.0      MCV 85   RDW 13.1        Chemistries:  Recent Labs   Lab 05/30/19  1430      K 3.7      CO2 28   BUN 14   CREATININE 1.2   CALCIUM 10.0   ALBUMIN 4.0   PROT 7.8   BILITOT 0.6   ALKPHOS 65   ALT 25   AST 35       All pertinent labs within the past 24 hours have been reviewed.    Significant Imaging: I have reviewed and interpreted all pertinent imaging results/findings within the past 24 hours.    Assessment/Plan:     Active Diagnoses:    Diagnosis Date Noted POA    Chest pain [R07.9] 05/30/2019 Yes    NSTEMI (non-ST elevated myocardial infarction) [I21.4] 05/30/2019 Yes      Problems Resolved During this Admission:     Impression:    NSTEMI - ASA, Plavix, heparin IV, NGT paste  ICU monitoring    Cardiology to review     Critical Care Time: 10 minutes  Critical secondary to Patient has a condition that poses threat to life and bodily function: Acute Myocardial Infarction     Critical care was time spent personally by me on the following activities: development of treatment plan with patient or surrogate and bedside caregivers, discussions with consultants, evaluation of patient's response to treatment, examination of patient, ordering and performing treatments and interventions, ordering and review of laboratory studies, ordering and review of radiographic studies, pulse oximetry, re-evaluation of patient's condition.  This critical care  time did not overlap with that of any other provider or involve time for any procedures.     Chato Barry MD  Critical Care - Medicine  [facility]

## 2019-05-31 NOTE — PLAN OF CARE
Problem: Adult Inpatient Plan of Care  Goal: Plan of Care Review  Outcome: Ongoing (interventions implemented as appropriate)  AAO X4. VSS. NSR on monitor. Heparin gtt. NS @75 mL/hr. Nitro paste to CW.  No complaints of pain at this time. Family at bedside.   Fall precautions in place, call bell in reach, bed in low and locked position.   POC discussed w/, verbalized understanding. Will continue to monitor.

## 2019-05-31 NOTE — HOSPITAL COURSE
5/31 Pt was seen and examined at bedside . He cont with mild chest pain .Cardiology was consulted and recommend to cont Heparin drip . The  Chest CTA did not show any acute finding / The TTE did not show any acute finding .   6/1 Pt was seen and examined at bedside . He  Denies any complaint for today . Cardiology recommend to cont heparin drip  dffor today .  Cardiology add Imdur and Norvasc . The troponin level are trending down . There no indication for a LHC at this time  Per cardiology . The Chest CTA did nto show any acute finding .   6/2 Pt was seen and examined at bedside . He had a chest pain episode  Last night . The troponin level is trending down . The heparin drip was d/c .  The Imdur was changed to 2 time a day .    6/3 Pt was seen and examined at bedside . He did no have any chest pain  Overnight . The kidney function trend up today . Cardiology now think is possible pericarditis . Pt was started on  Asa and colchicine per cardiology .   6/4 Pt was seen and examined at bedside .He was determined to be suitable for d/c . Per cardiology rec Stop Plavix per MD (normal coronaries)-Continue  mg BID x 1 week; then 650 mg once daily x 2 weeks-Continue Colchicine 0.6 mg BID x 1 month-No ACEi/ARB indicated as EF is normal-No BB given worsening symptoms on Metoprolol-Follow-up in clinic. Pt has been chest pain free for more than 48 hrs . The kidney function went back to baseline .

## 2019-05-31 NOTE — SUBJECTIVE & OBJECTIVE
Past Medical History:   Diagnosis Date    Adult general medical examination 11/28/2016    Hyperlipidemia     Leukopenia     NSTEMI (non-ST elevated myocardial infarction)        Past Surgical History:   Procedure Laterality Date    CATHETERIZATION, HEART, LEFT Left 5/24/2019    Performed by Shaheen Rich MD at [facility] CATH LAB       Review of patient's allergies indicates:  No Known Allergies    No current facility-administered medications on file prior to encounter.      Current Outpatient Medications on File Prior to Encounter   Medication Sig    aspirin (ECOTRIN) 81 MG EC tablet Take 1 tablet (81 mg total) by mouth once daily.    atorvastatin (LIPITOR) 40 MG tablet Take 1 tablet (40 mg total) by mouth every evening.    clopidogrel (PLAVIX) 75 mg tablet Take 1 tablet (75 mg total) by mouth once daily.    metoprolol tartrate (LOPRESSOR) 25 MG tablet Take 1 tablet (25 mg total) by mouth 2 (two) times daily.    nitroGLYCERIN (NITROSTAT) 0.4 MG SL tablet Place 1 tablet (0.4 mg total) under the tongue every 5 (five) minutes as needed for Chest pain (do not exceed 3 doses).     Family History     Problem Relation (Age of Onset)    Heart disease Paternal Grandfather    Hyperlipidemia Paternal Grandfather        Tobacco Use    Smoking status: Never Smoker    Smokeless tobacco: Never Used   Substance and Sexual Activity    Alcohol use: Yes     Comment: social    Drug use: No    Sexual activity: Yes     Partners: Female     Review of Systems   Constitutional: Negative for chills, diaphoresis, fatigue and fever.   HENT: Negative for congestion, sore throat and voice change.    Eyes: Negative for photophobia and visual disturbance.   Respiratory: Negative for cough, shortness of breath, wheezing and stridor.    Cardiovascular: Positive for chest pain. Negative for leg swelling.   Gastrointestinal: Negative for abdominal distention, abdominal pain, constipation, diarrhea, nausea and vomiting.   Endocrine:  Negative for polydipsia, polyphagia and polyuria.   Genitourinary: Negative for difficulty urinating, dysuria, flank pain, testicular pain and urgency.   Musculoskeletal: Negative for back pain, joint swelling, neck pain and neck stiffness.   Skin: Negative for color change and rash.   Allergic/Immunologic: Negative for immunocompromised state.   Neurological: Negative for dizziness, syncope, weakness, numbness and headaches.   Hematological: Does not bruise/bleed easily.   Psychiatric/Behavioral: Negative for agitation, behavioral problems and confusion.     Objective:     Vital Signs (Most Recent):  Temp: 98.2 °F (36.8 °C) (05/30/19 2145)  Pulse: (!) 59 (05/31/19 0200)  Resp: 14 (05/31/19 0200)  BP: (!) 106/58 (05/31/19 0200)  SpO2: 98 % (05/31/19 0200) Vital Signs (24h Range):  Temp:  [98.2 °F (36.8 °C)-99.3 °F (37.4 °C)] 98.2 °F (36.8 °C)  Pulse:  [59-82] 59  Resp:  [12-20] 14  SpO2:  [98 %-100 %] 98 %  BP: ()/(37-83) 106/58     Weight: 99.4 kg (219 lb 2.2 oz)  Body mass index is 28.14 kg/m².    Physical Exam   Constitutional: He is oriented to person, place, and time. He appears well-developed and well-nourished. No distress.   HENT:   Head: Normocephalic and atraumatic.   Nose: Nose normal.   Eyes: Pupils are equal, round, and reactive to light. Conjunctivae and EOM are normal. No scleral icterus.   Neck: Normal range of motion. Neck supple. No tracheal deviation present.   Cardiovascular: Normal rate, regular rhythm, normal heart sounds and intact distal pulses.   No murmur heard.  Pulmonary/Chest: Effort normal and breath sounds normal. No stridor. No respiratory distress. He has no wheezes. He has no rales.   Abdominal: Soft. Bowel sounds are normal. He exhibits no distension. There is no tenderness. There is no guarding.   Genitourinary:   Genitourinary Comments: See uds   Musculoskeletal: Normal range of motion. He exhibits no edema or deformity.   Neurological: He is alert and oriented to person,  place, and time. No cranial nerve deficit.   Skin: Skin is warm and dry. Capillary refill takes less than 2 seconds. No rash noted. He is not diaphoretic.   Psychiatric: He has a normal mood and affect. His behavior is normal. Judgment and thought content normal.   Nursing note and vitals reviewed.        CRANIAL NERVES     CN III, IV, VI   Pupils are equal, round, and reactive to light.  Extraocular motions are normal.        Significant Labs: All pertinent labs within the past 24 hours have been reviewed.  Results for orders placed or performed during the hospital encounter of 05/30/19   CBC auto differential   Result Value Ref Range    WBC 5.13 3.90 - 12.70 K/uL    RBC 5.15 4.60 - 6.20 M/uL    Hemoglobin 14.4 14.0 - 18.0 g/dL    Hematocrit 44.0 40.0 - 54.0 %    Mean Corpuscular Volume 85 82 - 98 fL    Mean Corpuscular Hemoglobin 28.0 27.0 - 31.0 pg    Mean Corpuscular Hemoglobin Conc 32.7 32.0 - 36.0 g/dL    RDW 13.1 11.5 - 14.5 %    Platelets 205 150 - 350 K/uL    MPV 10.4 9.2 - 12.9 fL    Gran # (ANC) 2.8 1.8 - 7.7 K/uL    Lymph # 1.7 1.0 - 4.8 K/uL    Mono # 0.5 0.3 - 1.0 K/uL    Eos # 0.1 0.0 - 0.5 K/uL    Baso # 0.03 0.00 - 0.20 K/uL    Gran% 55.1 38.0 - 73.0 %    Lymph% 32.2 18.0 - 48.0 %    Mono% 9.2 4.0 - 15.0 %    Eosinophil% 2.7 0.0 - 8.0 %    Basophil% 0.6 0.0 - 1.9 %    Differential Method Automated    Comprehensive metabolic panel   Result Value Ref Range    Sodium 141 136 - 145 mmol/L    Potassium 3.7 3.5 - 5.1 mmol/L    Chloride 104 95 - 110 mmol/L    CO2 28 23 - 29 mmol/L    Glucose 81 70 - 110 mg/dL    BUN, Bld 14 6 - 20 mg/dL    Creatinine 1.2 0.5 - 1.4 mg/dL    Calcium 10.0 8.7 - 10.5 mg/dL    Total Protein 7.8 6.0 - 8.4 g/dL    Albumin 4.0 3.5 - 5.2 g/dL    Total Bilirubin 0.6 0.1 - 1.0 mg/dL    Alkaline Phosphatase 65 55 - 135 U/L    AST 35 10 - 40 U/L    ALT 25 10 - 44 U/L    Anion Gap 9 8 - 16 mmol/L    eGFR if African American >60.0 >60 mL/min/1.73 m^2    eGFR if non African American >60.0  >60 mL/min/1.73 m^2   Troponin I #1   Result Value Ref Range    Troponin I 4.298 (H) 0.000 - 0.026 ng/mL   B-Type natriuretic peptide (BNP)   Result Value Ref Range    BNP 34 0 - 99 pg/mL   Protime-INR   Result Value Ref Range    Prothrombin Time 10.4 9.0 - 12.5 sec    INR 1.0 0.8 - 1.2   APTT   Result Value Ref Range    aPTT 27.4 21.0 - 32.0 sec   Troponin I #2   Result Value Ref Range    Troponin I 6.819 (H) 0.000 - 0.026 ng/mL   Drug screen panel, emergency   Result Value Ref Range    Benzodiazepines Negative     Methadone metabolites Negative     Cocaine (Metab.) Negative     Opiate Scrn, Ur Presumptive Positive     Barbiturate Screen, Ur Negative     Amphetamine Screen, Ur Negative     THC Negative     Phencyclidine Negative     Creatinine, Random Ur 212.7 23.0 - 375.0 mg/dL    Toxicology Information SEE COMMENT    Troponin I   Result Value Ref Range    Troponin I 6.394 (H) 0.000 - 0.026 ng/mL   APTT   Result Value Ref Range    aPTT 57.8 (H) 21.0 - 32.0 sec       Significant Imaging: I have reviewed all pertinent imaging results/findings within the past 24 hours.   Imaging Results          X-Ray Chest AP Portable (Final result)  Result time 05/30/19 14:57:30    Final result by ISRRAEL Banks Sr., MD (05/30/19 14:57:30)                 Impression:      Normal study.      Electronically signed by: Flip Banks MD  Date:    05/30/2019  Time:    14:57             Narrative:    EXAMINATION:  XR CHEST AP PORTABLE    CLINICAL HISTORY:  Chest Pain;    COMPARISON:  05/23/2019    FINDINGS:  The size of the heart is normal. The lungs are clear. There is no pneumothorax.  The costophrenic angles are sharp.

## 2019-05-31 NOTE — TELEPHONE ENCOUNTER
Please phone patient's spouse. I have completed and faxed her FMLA paperwork.     She can come  her copy at [facility] [employer] at her convenience.     Thanks

## 2019-05-31 NOTE — ED NOTES
Attempted to give report to Elisabeth gold request I return call due to an Emergency. Charge made aware.

## 2019-05-31 NOTE — PROGRESS NOTES
Received call back from Dr. Aguayo.  Notified dr. Aguayo that pts heart rate is in the mid to upper 50s so metoprolol was held.  Dr. Aguayo states he wants pt to get metoprolol dose.  Dose given by GUSTAVO Garcia RN.  Dr. Aguayo states he wants pt to remain NPO for further testing today.  Pt updated.

## 2019-05-31 NOTE — PLAN OF CARE
CM met with patient at the bedside to assess for discharge needs.  Patient was here recently and had a heart cath that was negative.  He states that he had chest pain again and presented to the ER.  He states the plan is to have another heart cath.  He is independent and does not anticipate any discharge needs at this time. CM provided a transitional care folder, information on advanced directives, information on pharmacy bedside delivery, and discharge planning begins on admission with contact information for any needs/questions.    D/C Plan: Home  PCP: Dr Troy  [facility]  Discharge transportation: Wife  [facility]: [facility]  Pharmacy Bedside Delivery: Yes     05/31/19 1003   Discharge Assessment   Assessment Type Discharge Planning Assessment   Confirmed/corrected address and phone number on facesheet? Yes   Assessment information obtained from? Patient;Medical Record   Expected Length of Stay (days)   (2-3)   Communicated expected length of stay with patient/caregiver yes   Prior to hospitilization cognitive status: Alert/Oriented   Prior to hospitalization functional status: Independent   Current cognitive status: Alert/Oriented   Current Functional Status: Independent   Facility Arrived From: home   Lives With spouse   Able to Return to Prior Arrangements yes   Is patient able to care for self after discharge? Yes   Who are your caregiver(s) and their phone number(s)? Lila Mccarthy, spouse [phone]   Patient's perception of discharge disposition home or selfcare   Readmission Within the Last 30 Days previous discharge plan unsuccessful   If yes, most recent facility name: [facility]   Patient currently being followed by outpatient case management? No   Patient currently receives any other outside agency services? No   Equipment Currently Used at Home none   Do you have any problems affording any of your prescribed medications? No   Is the patient taking  medications as prescribed? yes   Does the patient have transportation home? Yes   Transportation Anticipated family or friend will provide   Dialysis Name and Scheduled days NA   Does the patient receive services at the Coumadin Clinic? No   Discharge Plan A Home with family   DME Needed Upon Discharge  none   Patient/Family in Agreement with Plan yes   Readmission Questionnaire   At the time of your discharge, did someone talk to you about what your health problems were? Yes   At the time of discharge, did someone talk to you about what to watch out for regarding worsening of your health problem? Yes   At the time of discharge, did someone talk to you about what to do if you experienced worsening of your health problem? Yes   At the time of discharge, did someone talk to you about which medication to take when you left the hospital and which ones to stop taking? Yes   At the time of discharge, did someone talk to you about when and where to follow up with a doctor after you left the hospital? Yes   What do you believe caused you to be sick enough to be re-admitted? Chest Pain   How often do you need to have someone help you when you read instructions, pamphlets, or other written material from your doctor or pharmacy? Never   Do you have problems taking your medications as prescribed? No   Do you have any problems affording any of  your prescribed medications? No   Do you have problems obtaining/receiving your medications? No   Does the patient have transportation to healthcare appointments? Yes   Living Arrangements house   Does your caregiver provide all the help you need? Yes   Are you currently feeling confused? No   Are you currently having problems thinking? No   Are you currently having memory problems? No

## 2019-05-31 NOTE — PROGRESS NOTES
Clinical Pharmacy Progress Note: Discharge Medication Education     Patient was counseled by pharmacist on new medication isosorbide and its indications, side effects, and reasons for taking this medication.   Patient was given educational drug card/handout.   Patient/caregiver expressed understanding and had no further questions.    Thank you for allowing us to participate in this patient's care.    Patria Teague, PharmD 5/31/2019 1:57 PM

## 2019-05-31 NOTE — PLAN OF CARE
Problem: Adult Inpatient Plan of Care  Goal: Readiness for Transition of Care    Intervention: Mutually Develop Transition Plan     05/31/19 1003 05/31/19 1018   OTHER   Communicated expected length of stay with patient/caregiver yes  --    Is patient able to care for self after discharge? Yes  --    Who are your caregiver(s) and their phone number(s)? Lila Mccarthy, spouse [phone]  --    Patient currently receives home health services?  --  No   Discharge Needs Assessment   Readmission Within the Last 30 Days previous discharge plan unsuccessful  --    Equipment Currently Used at Home none  --    Transportation Anticipated family or friend will provide  --    Social Work Plan   Patient/Family in Agreement with Plan yes  --    Living Environment   Able to Return to Prior Arrangements yes  --    (RETIRED) Current Health   Expected Length of Stay (days)   (2-3)  --    (RETIRED) Social Work Plan   Patient's perception of discharge disposition home or selfcare  --

## 2019-05-31 NOTE — H&P
[facility] Hospital Medicine  History & Physical    Patient Name: Oumar Mccarthy  MRN: [id number]  Admission Date: 5/30/2019  Attending Physician: Buzz Byrne MD  Primary Care Provider: Leana Troy MD         Patient information was obtained from patient, past medical records and ER records.     Subjective:     Principal Problem:NSTEMI (non-ST elevated myocardial infarction)    Chief Complaint:   Chief Complaint   Patient presents with    Chest Pain     left side to left arm interm for 1 week. 5/10        HPI: 33 year old male presents as a trasnfer from outside freestanding ER for complaint of Chest Pain. Patient evaluated in the Er. Troponin 6.819. Started on Heparin drip. HM consulted, patient accepted and transferred for admission. Patient seen and examined. Patient reports pain intermittent to left side with radiation to left arm over the past week. Patient reports at this time currently pain free. No modifying factors. No associated symptoms. Prior treatment: ASA, also patient seen/admitted for similar complaint with having a normal heart cath 5/24/19. See chart review for more details.     Past Medical History:   Diagnosis Date    Adult general medical examination 11/28/2016    Hyperlipidemia     Leukopenia     NSTEMI (non-ST elevated myocardial infarction)        Past Surgical History:   Procedure Laterality Date    CATHETERIZATION, HEART, LEFT Left 5/24/2019    Performed by Shaheen Rich MD at [facility] CATH LAB       Review of patient's allergies indicates:  No Known Allergies    No current facility-administered medications on file prior to encounter.      Current Outpatient Medications on File Prior to Encounter   Medication Sig    aspirin (ECOTRIN) 81 MG EC tablet Take 1 tablet (81 mg total) by mouth once daily.    atorvastatin (LIPITOR) 40 MG tablet Take 1 tablet (40 mg total) by mouth every evening.    clopidogrel (PLAVIX) 75 mg tablet Take 1 tablet (75 mg total) by  mouth once daily.    metoprolol tartrate (LOPRESSOR) 25 MG tablet Take 1 tablet (25 mg total) by mouth 2 (two) times daily.    nitroGLYCERIN (NITROSTAT) 0.4 MG SL tablet Place 1 tablet (0.4 mg total) under the tongue every 5 (five) minutes as needed for Chest pain (do not exceed 3 doses).     Family History     Problem Relation (Age of Onset)    Heart disease Paternal Grandfather    Hyperlipidemia Paternal Grandfather        Tobacco Use    Smoking status: Never Smoker    Smokeless tobacco: Never Used   Substance and Sexual Activity    Alcohol use: Yes     Comment: social    Drug use: No    Sexual activity: Yes     Partners: Female     Review of Systems   Constitutional: Negative for chills, diaphoresis, fatigue and fever.   HENT: Negative for congestion, sore throat and voice change.    Eyes: Negative for photophobia and visual disturbance.   Respiratory: Negative for cough, shortness of breath, wheezing and stridor.    Cardiovascular: Positive for chest pain. Negative for leg swelling.   Gastrointestinal: Negative for abdominal distention, abdominal pain, constipation, diarrhea, nausea and vomiting.   Endocrine: Negative for polydipsia, polyphagia and polyuria.   Genitourinary: Negative for difficulty urinating, dysuria, flank pain, testicular pain and urgency.   Musculoskeletal: Negative for back pain, joint swelling, neck pain and neck stiffness.   Skin: Negative for color change and rash.   Allergic/Immunologic: Negative for immunocompromised state.   Neurological: Negative for dizziness, syncope, weakness, numbness and headaches.   Hematological: Does not bruise/bleed easily.   Psychiatric/Behavioral: Negative for agitation, behavioral problems and confusion.     Objective:     Vital Signs (Most Recent):  Temp: 98.2 °F (36.8 °C) (05/30/19 2145)  Pulse: (!) 59 (05/31/19 0200)  Resp: 14 (05/31/19 0200)  BP: (!) 106/58 (05/31/19 0200)  SpO2: 98 % (05/31/19 0200) Vital Signs (24h Range):  Temp:  [98.2 °F  (36.8 °C)-99.3 °F (37.4 °C)] 98.2 °F (36.8 °C)  Pulse:  [59-82] 59  Resp:  [12-20] 14  SpO2:  [98 %-100 %] 98 %  BP: ()/(37-83) 106/58     Weight: 99.4 kg (219 lb 2.2 oz)  Body mass index is 28.14 kg/m².    Physical Exam   Constitutional: He is oriented to person, place, and time. He appears well-developed and well-nourished. No distress.   HENT:   Head: Normocephalic and atraumatic.   Nose: Nose normal.   Eyes: Pupils are equal, round, and reactive to light. Conjunctivae and EOM are normal. No scleral icterus.   Neck: Normal range of motion. Neck supple. No tracheal deviation present.   Cardiovascular: Normal rate, regular rhythm, normal heart sounds and intact distal pulses.   No murmur heard.  Pulmonary/Chest: Effort normal and breath sounds normal. No stridor. No respiratory distress. He has no wheezes. He has no rales.   Abdominal: Soft. Bowel sounds are normal. He exhibits no distension. There is no tenderness. There is no guarding.   Genitourinary:   Genitourinary Comments: See uds   Musculoskeletal: Normal range of motion. He exhibits no edema or deformity.   Neurological: He is alert and oriented to person, place, and time. No cranial nerve deficit.   Skin: Skin is warm and dry. Capillary refill takes less than 2 seconds. No rash noted. He is not diaphoretic.   Psychiatric: He has a normal mood and affect. His behavior is normal. Judgment and thought content normal.   Nursing note and vitals reviewed.        CRANIAL NERVES     CN III, IV, VI   Pupils are equal, round, and reactive to light.  Extraocular motions are normal.        Significant Labs: All pertinent labs within the past 24 hours have been reviewed.  Results for orders placed or performed during the hospital encounter of 05/30/19   CBC auto differential   Result Value Ref Range    WBC 5.13 3.90 - 12.70 K/uL    RBC 5.15 4.60 - 6.20 M/uL    Hemoglobin 14.4 14.0 - 18.0 g/dL    Hematocrit 44.0 40.0 - 54.0 %    Mean Corpuscular Volume 85 82 - 98  fL    Mean Corpuscular Hemoglobin 28.0 27.0 - 31.0 pg    Mean Corpuscular Hemoglobin Conc 32.7 32.0 - 36.0 g/dL    RDW 13.1 11.5 - 14.5 %    Platelets 205 150 - 350 K/uL    MPV 10.4 9.2 - 12.9 fL    Gran # (ANC) 2.8 1.8 - 7.7 K/uL    Lymph # 1.7 1.0 - 4.8 K/uL    Mono # 0.5 0.3 - 1.0 K/uL    Eos # 0.1 0.0 - 0.5 K/uL    Baso # 0.03 0.00 - 0.20 K/uL    Gran% 55.1 38.0 - 73.0 %    Lymph% 32.2 18.0 - 48.0 %    Mono% 9.2 4.0 - 15.0 %    Eosinophil% 2.7 0.0 - 8.0 %    Basophil% 0.6 0.0 - 1.9 %    Differential Method Automated    Comprehensive metabolic panel   Result Value Ref Range    Sodium 141 136 - 145 mmol/L    Potassium 3.7 3.5 - 5.1 mmol/L    Chloride 104 95 - 110 mmol/L    CO2 28 23 - 29 mmol/L    Glucose 81 70 - 110 mg/dL    BUN, Bld 14 6 - 20 mg/dL    Creatinine 1.2 0.5 - 1.4 mg/dL    Calcium 10.0 8.7 - 10.5 mg/dL    Total Protein 7.8 6.0 - 8.4 g/dL    Albumin 4.0 3.5 - 5.2 g/dL    Total Bilirubin 0.6 0.1 - 1.0 mg/dL    Alkaline Phosphatase 65 55 - 135 U/L    AST 35 10 - 40 U/L    ALT 25 10 - 44 U/L    Anion Gap 9 8 - 16 mmol/L    eGFR if African American >60.0 >60 mL/min/1.73 m^2    eGFR if non African American >60.0 >60 mL/min/1.73 m^2   Troponin I #1   Result Value Ref Range    Troponin I 4.298 (H) 0.000 - 0.026 ng/mL   B-Type natriuretic peptide (BNP)   Result Value Ref Range    BNP 34 0 - 99 pg/mL   Protime-INR   Result Value Ref Range    Prothrombin Time 10.4 9.0 - 12.5 sec    INR 1.0 0.8 - 1.2   APTT   Result Value Ref Range    aPTT 27.4 21.0 - 32.0 sec   Troponin I #2   Result Value Ref Range    Troponin I 6.819 (H) 0.000 - 0.026 ng/mL   Drug screen panel, emergency   Result Value Ref Range    Benzodiazepines Negative     Methadone metabolites Negative     Cocaine (Metab.) Negative     Opiate Scrn, Ur Presumptive Positive     Barbiturate Screen, Ur Negative     Amphetamine Screen, Ur Negative     THC Negative     Phencyclidine Negative     Creatinine, Random Ur 212.7 23.0 - 375.0 mg/dL    Toxicology  Information SEE COMMENT    Troponin I   Result Value Ref Range    Troponin I 6.394 (H) 0.000 - 0.026 ng/mL   APTT   Result Value Ref Range    aPTT 57.8 (H) 21.0 - 32.0 sec       Significant Imaging: I have reviewed all pertinent imaging results/findings within the past 24 hours.   Imaging Results          X-Ray Chest AP Portable (Final result)  Result time 05/30/19 14:57:30    Final result by ISRRAEL Banks Sr., MD (05/30/19 14:57:30)                 Impression:      Normal study.      Electronically signed by: Flip Banks MD  Date:    05/30/2019  Time:    14:57             Narrative:    EXAMINATION:  XR CHEST AP PORTABLE    CLINICAL HISTORY:  Chest Pain;    COMPARISON:  05/23/2019    FINDINGS:  The size of the heart is normal. The lungs are clear. There is no pneumothorax.  The costophrenic angles are sharp.                                I have personally reviewed the patients labs, imaging, ekg and discussed the patient case in detail with the Er provider    Assessment/Plan:     * NSTEMI (non-ST elevated myocardial infarction)    -Admit on heparin drip.  -Troponin Result in Er 6.819  -EKG in ED reviewed  -Serial enzymes  - Given in   -ASA 81mg daily  -Plavix, statin  -Consult to cardiology  -Patient makes mention of having a possible Protein S deficiency in family history.  Consider further heme workup pending course  Further evaluation/diagnostics/interventions/consults pending course         Hypertension  Continue home meds  Monitor tends  Further evaluation/diagnostics/interventions/consults pending course         Hyperlipidemia  Statin  Had recent lipid and a1c  Further evaluation/diagnostics/interventions/consults pending course       VTE Risk Mitigation (From admission, onward)        Ordered     IP VTE HIGH RISK PATIENT  Once      05/30/19 2144     Place SARY hose  Until discontinued      05/30/19 2144     Place sequential compression device  Until discontinued      05/30/19 2144     Reason for  No Pharmacological VTE Prophylaxis  Once      05/30/19 2144     heparin 25,000 units in dextrose 5% 250 mL (100 units/mL) infusion LOW INTENSITY nomogram - [facility]  Continuous      05/30/19 1546     heparin 25,000 units in dextrose 5% (100 units/ml) IV bolus from bag - ADDITIONAL PRN BOLUS - 60 units/kg (max bolus 4000 units)  As needed (PRN)      05/30/19 1546     heparin 25,000 units in dextrose 5% (100 units/ml) IV bolus from bag - ADDITIONAL PRN BOLUS - 30 units/kg (max bolus 4000 units)  As needed (PRN)      05/30/19 1546        **Portions of this note has been dictated using speech recognition software, M*Modal Fluency Direct; although, time has been taken to proof read and revise it may still contain misspellings, grammatical and or other errors.**      Mauricio Gardner NP  Department of Hospital Medicine   [facility]

## 2019-05-31 NOTE — ED NOTES
Updated pt on poc, transfer process and admit orders. Pt family and pt denies questions or concerns. Call light in reach. Pt watching tv.

## 2019-05-31 NOTE — NURSING TRANSFER
Nursing Transfer Note      5/31/2019     Transfer TRANSFER TO: Telemetry  251/FROM: ICU 18     Transfer via : Wheelchair    Transfer with: phone, phone     Transported by: HUE Morales / HUE Seals    Medicines sent: Heparin drip    Chart send with patient: YES    Notified: HUE James    - PT stable. Transferred with telemetry monitor intact.    Time Upon arrival to floor: 0935

## 2019-05-31 NOTE — SUBJECTIVE & OBJECTIVE
Past Medical History:   Diagnosis Date    Adult general medical examination 11/28/2016    Hyperlipidemia     Leukopenia     NSTEMI (non-ST elevated myocardial infarction)        Past Surgical History:   Procedure Laterality Date    CATHETERIZATION, HEART, LEFT Left 5/24/2019    Performed by Shaheen Rich MD at [facility] CATH LAB       Review of patient's allergies indicates:  No Known Allergies    No current facility-administered medications on file prior to encounter.      Current Outpatient Medications on File Prior to Encounter   Medication Sig    aspirin (ECOTRIN) 81 MG EC tablet Take 1 tablet (81 mg total) by mouth once daily.    atorvastatin (LIPITOR) 40 MG tablet Take 1 tablet (40 mg total) by mouth every evening.    clopidogrel (PLAVIX) 75 mg tablet Take 1 tablet (75 mg total) by mouth once daily.    metoprolol tartrate (LOPRESSOR) 25 MG tablet Take 1 tablet (25 mg total) by mouth 2 (two) times daily.    nitroGLYCERIN (NITROSTAT) 0.4 MG SL tablet Place 1 tablet (0.4 mg total) under the tongue every 5 (five) minutes as needed for Chest pain (do not exceed 3 doses).     Family History     Problem Relation (Age of Onset)    Heart disease Paternal Grandfather    Hyperlipidemia Paternal Grandfather        Tobacco Use    Smoking status: Never Smoker    Smokeless tobacco: Never Used   Substance and Sexual Activity    Alcohol use: Yes     Comment: social    Drug use: No    Sexual activity: Yes     Partners: Female     Review of Systems   Constitution: Negative.   HENT: Negative.    Eyes: Negative.    Cardiovascular: Positive for chest pain.   Respiratory: Negative.    Endocrine: Negative.    Hematologic/Lymphatic: Negative.    Skin: Negative.    Musculoskeletal: Negative.    Gastrointestinal: Negative.    Genitourinary: Negative.    Neurological: Negative.    Psychiatric/Behavioral: Negative.    Allergic/Immunologic: Negative.      Objective:     Vital Signs (Most Recent):  Temp: 98.3 °F (36.8 °C)  (05/31/19 1127)  Pulse: 65 (05/31/19 1127)  Resp: 16 (05/31/19 0941)  BP: 122/75 (05/31/19 1127)  SpO2: 100 % (05/31/19 1127) Vital Signs (24h Range):  Temp:  [98.1 °F (36.7 °C)-99.3 °F (37.4 °C)] 98.3 °F (36.8 °C)  Pulse:  [58-82] 65  Resp:  [12-23] 16  SpO2:  [98 %-100 %] 100 %  BP: ()/(37-83) 122/75     Weight: 99.4 kg (219 lb 2.2 oz)  Body mass index is 28.14 kg/m².    SpO2: 100 %  O2 Device (Oxygen Therapy): room air      Intake/Output Summary (Last 24 hours) at 5/31/2019 1240  Last data filed at 5/30/2019 2028  Gross per 24 hour   Intake --   Output 300 ml   Net -300 ml       Lines/Drains/Airways     Peripheral Intravenous Line                 Peripheral IV - Single Lumen 05/30/19 1425 18 G Right Antecubital less than 1 day         Peripheral IV - Single Lumen 05/30/19 1600 20 G Right Hand less than 1 day                Physical Exam   Constitutional: He is oriented to person, place, and time. He appears well-developed and well-nourished.   HENT:   Head: Normocephalic.   Neck: Normal range of motion. Neck supple. Normal carotid pulses, no hepatojugular reflux and no JVD present. Carotid bruit is not present. No thyromegaly present.   Cardiovascular: Normal rate, regular rhythm, S1 normal and S2 normal. PMI is not displaced. Exam reveals no S3, no S4, no distant heart sounds, no friction rub, no midsystolic click and no opening snap.   No murmur heard.  Pulses:       Radial pulses are 2+ on the right side, and 2+ on the left side.   Pulmonary/Chest: Effort normal and breath sounds normal. He has no wheezes. He has no rales.   Abdominal: Soft. Bowel sounds are normal. He exhibits no distension, no abdominal bruit, no ascites and no mass. There is no tenderness.   Musculoskeletal: He exhibits no edema.   Neurological: He is alert and oriented to person, place, and time.   Skin: Skin is warm.   Psychiatric: He has a normal mood and affect. His behavior is normal.   Nursing note and vitals  reviewed.      Significant Labs:   BMP:   Recent Labs   Lab 05/30/19  1430 05/31/19  0404   GLU 81 99    138   K 3.7 4.2    103   CO2 28 27   BUN 14 12   CREATININE 1.2 1.1   CALCIUM 10.0 9.5   MG  --  2.0   , CMP   Recent Labs   Lab 05/30/19  1430 05/31/19  0404    138   K 3.7 4.2    103   CO2 28 27   GLU 81 99   BUN 14 12   CREATININE 1.2 1.1   CALCIUM 10.0 9.5   PROT 7.8  --    ALBUMIN 4.0  --    BILITOT 0.6  --    ALKPHOS 65  --    AST 35  --    ALT 25  --    ANIONGAP 9 8   ESTGFRAFRICA >60.0 >60   EGFRNONAA >60.0 >60   , CBC   Recent Labs   Lab 05/30/19  1430 05/31/19  0404   WBC 5.13 6.60   HGB 14.4 13.0*   HCT 44.0 40.0    205   , INR   Recent Labs   Lab 05/30/19  1430 05/31/19  0404   INR 1.0 1.0   , Lipid Panel No results for input(s): CHOL, HDL, LDLCALC, TRIG, CHOLHDL in the last 48 hours. and Troponin   Recent Labs   Lab 05/30/19  1749 05/30/19  2235 05/31/19  0404   TROPONINI 6.819* 6.394* 3.790*       Significant Imaging: Echocardiogram:   2D echo with color flow doppler:   Results for orders placed or performed during the hospital encounter of 05/30/19   2D echo with color flow doppler   Result Value Ref Range    QEF 55 55 - 65    Diastolic Dysfunction No     Est. PA Systolic Pressure 15.25

## 2019-05-31 NOTE — SUBJECTIVE & OBJECTIVE
Interval History:     Review of Systems   Constitutional: Negative for chills, diaphoresis, fatigue and fever.   HENT: Negative for congestion, sore throat and voice change.    Eyes: Negative for photophobia and visual disturbance.   Respiratory: Negative for cough, shortness of breath, wheezing and stridor.    Cardiovascular: Positive for chest pain. Negative for leg swelling.   Gastrointestinal: Negative for abdominal distention, abdominal pain, constipation, diarrhea, nausea and vomiting.   Endocrine: Negative for polydipsia, polyphagia and polyuria.   Genitourinary: Negative for difficulty urinating, dysuria, flank pain, testicular pain and urgency.   Musculoskeletal: Negative for back pain, joint swelling, neck pain and neck stiffness.   Skin: Negative for color change and rash.   Allergic/Immunologic: Negative for immunocompromised state.   Neurological: Negative for dizziness, syncope, weakness, numbness and headaches.   Hematological: Does not bruise/bleed easily.   Psychiatric/Behavioral: Negative for agitation, behavioral problems and confusion.     Objective:     Vital Signs (Most Recent):  Temp: 98.3 °F (36.8 °C) (05/31/19 1127)  Pulse: 65 (05/31/19 1127)  Resp: 16 (05/31/19 0941)  BP: 122/75 (05/31/19 1127)  SpO2: 100 % (05/31/19 1127) Vital Signs (24h Range):  Temp:  [98.1 °F (36.7 °C)-99 °F (37.2 °C)] 98.3 °F (36.8 °C)  Pulse:  [58-81] 65  Resp:  [12-23] 16  SpO2:  [98 %-100 %] 100 %  BP: ()/(37-82) 122/75     Weight: 99.4 kg (219 lb 2.2 oz)  Body mass index is 28.14 kg/m².    Intake/Output Summary (Last 24 hours) at 5/31/2019 1434  Last data filed at 5/31/2019 1205  Gross per 24 hour   Intake 0 ml   Output 400 ml   Net -400 ml      Physical Exam   Constitutional: He is oriented to person, place, and time. He appears well-developed and well-nourished. No distress.   HENT:   Head: Normocephalic and atraumatic.   Nose: Nose normal.   Eyes: Pupils are equal, round, and reactive to light.  Conjunctivae and EOM are normal. No scleral icterus.   Neck: Normal range of motion. Neck supple. No tracheal deviation present.   Cardiovascular: Normal rate, regular rhythm, normal heart sounds and intact distal pulses.   No murmur heard.  Pulmonary/Chest: Effort normal and breath sounds normal. No stridor. No respiratory distress. He has no wheezes. He has no rales.   Abdominal: Soft. Bowel sounds are normal. He exhibits no distension. There is no tenderness. There is no guarding.   Musculoskeletal: Normal range of motion. He exhibits no edema or deformity.   Neurological: He is alert and oriented to person, place, and time. No cranial nerve deficit.   Skin: Skin is warm and dry. Capillary refill takes less than 2 seconds. No rash noted. He is not diaphoretic.   Psychiatric: He has a normal mood and affect. His behavior is normal. Judgment and thought content normal.   Nursing note and vitals reviewed.      Significant Labs: All pertinent labs within the past 24 hours have been reviewed.    Significant Imaging: I have reviewed all pertinent imaging results/findings within the past 24 hours.

## 2019-06-01 PROBLEM — I20.1 CORONARY VASOSPASM: Status: ACTIVE | Noted: 2019-06-01

## 2019-06-01 LAB
ANION GAP SERPL CALC-SCNC: 9 MMOL/L (ref 8–16)
APTT BLDCRRT: 49.6 SEC (ref 21–32)
BASOPHILS # BLD AUTO: 0.01 K/UL (ref 0–0.2)
BASOPHILS NFR BLD: 0.2 % (ref 0–1.9)
BUN SERPL-MCNC: 12 MG/DL (ref 6–20)
CALCIUM SERPL-MCNC: 9.6 MG/DL (ref 8.7–10.5)
CHLORIDE SERPL-SCNC: 105 MMOL/L (ref 95–110)
CO2 SERPL-SCNC: 25 MMOL/L (ref 23–29)
CREAT SERPL-MCNC: 1.1 MG/DL (ref 0.5–1.4)
DIFFERENTIAL METHOD: ABNORMAL
EOSINOPHIL # BLD AUTO: 0.1 K/UL (ref 0–0.5)
EOSINOPHIL NFR BLD: 2 % (ref 0–8)
ERYTHROCYTE [DISTWIDTH] IN BLOOD BY AUTOMATED COUNT: 13.3 % (ref 11.5–14.5)
EST. GFR  (AFRICAN AMERICAN): >60 ML/MIN/1.73 M^2
EST. GFR  (NON AFRICAN AMERICAN): >60 ML/MIN/1.73 M^2
GLUCOSE SERPL-MCNC: 91 MG/DL (ref 70–110)
HCT VFR BLD AUTO: 42.2 % (ref 40–54)
HGB BLD-MCNC: 14.2 G/DL (ref 14–18)
LYMPHOCYTES # BLD AUTO: 1.8 K/UL (ref 1–4.8)
LYMPHOCYTES NFR BLD: 39 % (ref 18–48)
MCH RBC QN AUTO: 28.7 PG (ref 27–31)
MCHC RBC AUTO-ENTMCNC: 33.6 G/DL (ref 32–36)
MCV RBC AUTO: 85 FL (ref 82–98)
MONOCYTES # BLD AUTO: 0.2 K/UL (ref 0.3–1)
MONOCYTES NFR BLD: 4.7 % (ref 4–15)
NEUTROPHILS # BLD AUTO: 2.4 K/UL (ref 1.8–7.7)
NEUTROPHILS NFR BLD: 54.1 % (ref 38–73)
PLATELET # BLD AUTO: 203 K/UL (ref 150–350)
PMV BLD AUTO: 10.3 FL (ref 9.2–12.9)
POTASSIUM SERPL-SCNC: 3.9 MMOL/L (ref 3.5–5.1)
RBC # BLD AUTO: 4.94 M/UL (ref 4.6–6.2)
SODIUM SERPL-SCNC: 139 MMOL/L (ref 136–145)
TROPONIN I SERPL DL<=0.01 NG/ML-MCNC: 2.14 NG/ML (ref 0–0.03)
WBC # BLD AUTO: 4.51 K/UL (ref 3.9–12.7)

## 2019-06-01 PROCEDURE — 99233 PR SUBSEQUENT HOSPITAL CARE,LEVL III: ICD-10-PCS | Mod: ,,, | Performed by: INTERNAL MEDICINE

## 2019-06-01 PROCEDURE — 25000003 PHARM REV CODE 250: Performed by: NURSE PRACTITIONER

## 2019-06-01 PROCEDURE — 99233 SBSQ HOSP IP/OBS HIGH 50: CPT | Mod: ,,, | Performed by: INTERNAL MEDICINE

## 2019-06-01 PROCEDURE — 21400001 HC TELEMETRY ROOM

## 2019-06-01 PROCEDURE — 63600175 PHARM REV CODE 636 W HCPCS: Performed by: EMERGENCY MEDICINE

## 2019-06-01 PROCEDURE — 25000003 PHARM REV CODE 250: Performed by: EMERGENCY MEDICINE

## 2019-06-01 PROCEDURE — 80048 BASIC METABOLIC PNL TOTAL CA: CPT

## 2019-06-01 PROCEDURE — 36415 COLL VENOUS BLD VENIPUNCTURE: CPT

## 2019-06-01 PROCEDURE — 85730 THROMBOPLASTIN TIME PARTIAL: CPT

## 2019-06-01 PROCEDURE — 25000003 PHARM REV CODE 250: Performed by: INTERNAL MEDICINE

## 2019-06-01 PROCEDURE — 84484 ASSAY OF TROPONIN QUANT: CPT

## 2019-06-01 PROCEDURE — 25000003 PHARM REV CODE 250: Performed by: CLINIC/CENTER

## 2019-06-01 PROCEDURE — 85025 COMPLETE CBC W/AUTO DIFF WBC: CPT

## 2019-06-01 RX ORDER — HYDROCODONE BITARTRATE AND ACETAMINOPHEN 10; 325 MG/1; MG/1
1 TABLET ORAL EVERY 6 HOURS PRN
Status: DISCONTINUED | OUTPATIENT
Start: 2019-06-02 | End: 2019-06-04 | Stop reason: HOSPADM

## 2019-06-01 RX ORDER — ISOSORBIDE MONONITRATE 30 MG/1
30 TABLET, EXTENDED RELEASE ORAL DAILY
Status: DISCONTINUED | OUTPATIENT
Start: 2019-06-01 | End: 2019-06-02

## 2019-06-01 RX ORDER — AMOXICILLIN 250 MG
1 CAPSULE ORAL DAILY PRN
Status: DISCONTINUED | OUTPATIENT
Start: 2019-06-01 | End: 2019-06-04 | Stop reason: HOSPADM

## 2019-06-01 RX ORDER — NITROGLYCERIN 0.4 MG/1
0.4 TABLET SUBLINGUAL EVERY 5 MIN PRN
Status: DISCONTINUED | OUTPATIENT
Start: 2019-06-02 | End: 2019-06-04 | Stop reason: HOSPADM

## 2019-06-01 RX ADMIN — CLOPIDOGREL BISULFATE 75 MG: 75 TABLET ORAL at 08:06

## 2019-06-01 RX ADMIN — ATORVASTATIN CALCIUM 40 MG: 40 TABLET, FILM COATED ORAL at 08:06

## 2019-06-01 RX ADMIN — HEPARIN SODIUM AND DEXTROSE 10 UNITS/KG/HR: 10000; 5 INJECTION INTRAVENOUS at 01:06

## 2019-06-01 RX ADMIN — AMLODIPINE BESYLATE 5 MG: 5 TABLET ORAL at 08:06

## 2019-06-01 RX ADMIN — HYDROCODONE BITARTRATE AND ACETAMINOPHEN 1 TABLET: 10; 325 TABLET ORAL at 11:06

## 2019-06-01 RX ADMIN — SODIUM CHLORIDE: 0.9 INJECTION, SOLUTION INTRAVENOUS at 09:06

## 2019-06-01 RX ADMIN — ISOSORBIDE MONONITRATE 30 MG: 30 TABLET, EXTENDED RELEASE ORAL at 08:06

## 2019-06-01 RX ADMIN — ASPIRIN 81 MG: 81 TABLET, COATED ORAL at 08:06

## 2019-06-01 RX ADMIN — ACETAMINOPHEN 650 MG: 325 TABLET, FILM COATED ORAL at 11:06

## 2019-06-01 RX ADMIN — RANOLAZINE 500 MG: 500 TABLET, FILM COATED, EXTENDED RELEASE ORAL at 08:06

## 2019-06-01 RX ADMIN — METOPROLOL TARTRATE 25 MG: 25 TABLET ORAL at 08:06

## 2019-06-01 RX ADMIN — SENNOSIDES, DOCUSATE SODIUM 1 TABLET: 50; 8.6 TABLET, FILM COATED ORAL at 08:06

## 2019-06-01 RX ADMIN — NITROGLYCERIN 0.4 MG: 0.4 TABLET, ORALLY DISINTEGRATING SUBLINGUAL at 11:06

## 2019-06-01 RX ADMIN — PANTOPRAZOLE SODIUM 40 MG: 40 TABLET, DELAYED RELEASE ORAL at 08:06

## 2019-06-01 NOTE — ASSESSMENT & PLAN NOTE
-Cardiology  On board  -Chest CTA did not show any acute abnormalities   -Per cardiology no LHC at this time   - The TTE did not show any  acute finding

## 2019-06-01 NOTE — PROGRESS NOTES
[facility] Hospital Medicine  Progress Note    Patient Name: Oumar Mccarthy  MRN: [id number]  Patient Class: IP- Inpatient   Admission Date: 5/30/2019  Length of Stay: 2 days  Attending Physician: Rodney Quezada, *  Primary Care Provider: Leana Troy MD        Subjective:     Principal Problem:NSTEMI (non-ST elevated myocardial infarction)    HPI:  33 year old male presents as a trasnfer from outside freestanding ER for complaint of Chest Pain. Patient evaluated in the Er. Troponin 6.819. Started on Heparin drip. HM consulted, patient accepted and transferred for admission. Patient seen and examined. Patient reports pain intermittent to left side with radiation to left arm over the past week. Patient reports at this time currently pain free. No modifying factors. No associated symptoms. Prior treatment: ASA, also patient seen/admitted for similar complaint with having a normal heart cath 5/24/19. See chart review for more details.     Hospital Course:  5/31 Pt was seen and examined at bedside . He cont with mild chest pain .Cardiology was consulted and recommend to cont Heparin drip . The  Chest CTA did not show any acute finding / The TTE did not show any acute finding .   6/1 Pt was seen and examined at bedside . He  Denies any complaint for today . Cardiology recommend to cont heparin drip  dffor today .  Cardiology add Imdur and Norvasc . The troponin level are trending down . There no indication for a LHC at this time  Per cardiology . The Chest CTA did nto show any acute finding .     Interval History:     Review of Systems   Constitutional: Negative for chills, diaphoresis, fatigue and fever.   HENT: Negative for congestion, sore throat and voice change.    Eyes: Negative for photophobia and visual disturbance.   Respiratory: Negative for cough, shortness of breath, wheezing and stridor.    Cardiovascular: Negative for chest pain and leg swelling.   Gastrointestinal: Negative  for abdominal distention, abdominal pain, constipation, diarrhea, nausea and vomiting.   Endocrine: Negative for polydipsia, polyphagia and polyuria.   Genitourinary: Negative for difficulty urinating, dysuria, flank pain, testicular pain and urgency.   Musculoskeletal: Negative for back pain, joint swelling, neck pain and neck stiffness.   Skin: Negative for color change and rash.   Allergic/Immunologic: Negative for immunocompromised state.   Neurological: Negative for dizziness, syncope, weakness, numbness and headaches.   Hematological: Does not bruise/bleed easily.   Psychiatric/Behavioral: Negative for agitation, behavioral problems and confusion.     Objective:     Vital Signs (Most Recent):  Temp: 98 °F (36.7 °C) (06/01/19 0742)  Pulse: 69 (06/01/19 0742)  Resp: 18 (06/01/19 0742)  BP: 115/60 (06/01/19 0742)  SpO2: 98 % (06/01/19 0742) Vital Signs (24h Range):  Temp:  [96.4 °F (35.8 °C)-99.1 °F (37.3 °C)] 98 °F (36.7 °C)  Pulse:  [61-77] 69  Resp:  [16-18] 18  SpO2:  [97 %-100 %] 98 %  BP: (106-118)/(56-65) 115/60     Weight: 100 kg (220 lb 7.4 oz)  Body mass index is 28.31 kg/m².    Intake/Output Summary (Last 24 hours) at 6/1/2019 1426  Last data filed at 6/1/2019 0500  Gross per 24 hour   Intake 1643 ml   Output 600 ml   Net 1043 ml      Physical Exam   Constitutional: He is oriented to person, place, and time. He appears well-developed and well-nourished. No distress.   HENT:   Head: Normocephalic and atraumatic.   Nose: Nose normal.   Eyes: Pupils are equal, round, and reactive to light. Conjunctivae and EOM are normal. No scleral icterus.   Neck: Normal range of motion. Neck supple. No tracheal deviation present.   Cardiovascular: Normal rate, regular rhythm, normal heart sounds and intact distal pulses.   No murmur heard.  Pulmonary/Chest: Effort normal and breath sounds normal. No stridor. No respiratory distress. He has no wheezes. He has no rales.   Abdominal: Soft. Bowel sounds are normal. He  exhibits no distension. There is no tenderness. There is no guarding.   Musculoskeletal: Normal range of motion. He exhibits no edema or deformity.   Neurological: He is alert and oriented to person, place, and time. No cranial nerve deficit.   Skin: Skin is warm and dry. Capillary refill takes less than 2 seconds. No rash noted. He is not diaphoretic.   Psychiatric: He has a normal mood and affect. His behavior is normal. Judgment and thought content normal.   Nursing note and vitals reviewed.      Significant Labs: All pertinent labs within the past 24 hours have been reviewed.    Significant Imaging: I have reviewed all pertinent imaging results/findings within the past 24 hours.    Assessment/Plan:      * NSTEMI (non-ST elevated myocardial infarction)  -Cardiology  On board  -Chest CTA did not show any acute abnormalities   -Per cardiology no [facility] at this time   - The TTE did not show any  acute finding         Coronary vasospasm  started on Imdur and Norvasc  Cont asa and plavix        Abnormal ECG        Hypertension  Continue home meds  Monitor tends          Hyperlipidemia  Statin          VTE Risk Mitigation (From admission, onward)        Ordered     IP VTE HIGH RISK PATIENT  Once      05/30/19 2144     Place SARY hose  Until discontinued      05/30/19 2144     Place sequential compression device  Until discontinued      05/30/19 2144     Reason for No Pharmacological VTE Prophylaxis  Once      05/30/19 2144     heparin 25,000 units in dextrose 5% 250 mL (100 units/mL) infusion LOW INTENSITY nomogram - [facility]  Continuous      05/30/19 1546     heparin 25,000 units in dextrose 5% (100 units/ml) IV bolus from bag - ADDITIONAL PRN BOLUS - 60 units/kg (max bolus 4000 units)  As needed (PRN)      05/30/19 1546     heparin 25,000 units in dextrose 5% (100 units/ml) IV bolus from bag - ADDITIONAL PRN BOLUS - 30 units/kg (max bolus 4000 units)  As needed (PRN)      05/30/19 1546              Rodney Quezada  MD  Department of Hospital Medicine   [facility]

## 2019-06-01 NOTE — HOSPITAL COURSE
6/1/19:  PT FEELS REMARKABLY IMPROVED TODAY.  DENIES CHEST PAIN SXS. NO CHF SXS.  HAS BEEN STARTED ON NORVASC AND NITRATES THIS ADMISSION.  CTA CHEST NO PE, NORMAL AORTA.  F/U ECHO SHOWED NORMAL LVEF, NO WMA, NORMAL VALVES.     6/2/19:  PT HAD EPISODES OF SHARP CP LAST NIGHT THEN RESOLVED.  HE FELL ASLEEP PER PT AND HAS NOT HAD RECURRENT CP TODAY.  NO DYSPNEA. HE HAS NO COMPLAINTS AT TIME OF VISIT THIS AFTERNOON.  TROPONIN RECHECKED AND CONTINUES TO TREND DOWN AND ECG TODAY SHOWS NSR, INFERIOR ST-T ABNORMALITY NOTED ON ALL ECGS OVER LAST 10 DAYS.      6/3/19-Patient seen and examined today. Uneventful night. No recurrence of chest pain. Still complains of some left upper extremity weakness/heaviness (was ongoing prior to cath and initial admission). No SOB. Tolerating meds. Labs reviewed, creatinine 1.5.    6/4/19-Patient seen and examined today, resting in bed. Feels well. Still has occasional pleuritic type chest discomfort, much improved from admission. Left arm heaviness/weakness also improved. No SOB. Labs reviewed. Troponin trending down. Creatinine has normalized. EKG without acute ischemic changes.

## 2019-06-01 NOTE — SUBJECTIVE & OBJECTIVE
Interval History:     Review of Systems   Constitutional: Negative for chills, diaphoresis, fatigue and fever.   HENT: Negative for congestion, sore throat and voice change.    Eyes: Negative for photophobia and visual disturbance.   Respiratory: Negative for cough, shortness of breath, wheezing and stridor.    Cardiovascular: Negative for chest pain and leg swelling.   Gastrointestinal: Negative for abdominal distention, abdominal pain, constipation, diarrhea, nausea and vomiting.   Endocrine: Negative for polydipsia, polyphagia and polyuria.   Genitourinary: Negative for difficulty urinating, dysuria, flank pain, testicular pain and urgency.   Musculoskeletal: Negative for back pain, joint swelling, neck pain and neck stiffness.   Skin: Negative for color change and rash.   Allergic/Immunologic: Negative for immunocompromised state.   Neurological: Negative for dizziness, syncope, weakness, numbness and headaches.   Hematological: Does not bruise/bleed easily.   Psychiatric/Behavioral: Negative for agitation, behavioral problems and confusion.     Objective:     Vital Signs (Most Recent):  Temp: 98 °F (36.7 °C) (06/01/19 0742)  Pulse: 69 (06/01/19 0742)  Resp: 18 (06/01/19 0742)  BP: 115/60 (06/01/19 0742)  SpO2: 98 % (06/01/19 0742) Vital Signs (24h Range):  Temp:  [96.4 °F (35.8 °C)-99.1 °F (37.3 °C)] 98 °F (36.7 °C)  Pulse:  [61-77] 69  Resp:  [16-18] 18  SpO2:  [97 %-100 %] 98 %  BP: (106-118)/(56-65) 115/60     Weight: 100 kg (220 lb 7.4 oz)  Body mass index is 28.31 kg/m².    Intake/Output Summary (Last 24 hours) at 6/1/2019 1426  Last data filed at 6/1/2019 0500  Gross per 24 hour   Intake 1643 ml   Output 600 ml   Net 1043 ml      Physical Exam   Constitutional: He is oriented to person, place, and time. He appears well-developed and well-nourished. No distress.   HENT:   Head: Normocephalic and atraumatic.   Nose: Nose normal.   Eyes: Pupils are equal, round, and reactive to light. Conjunctivae and EOM  are normal. No scleral icterus.   Neck: Normal range of motion. Neck supple. No tracheal deviation present.   Cardiovascular: Normal rate, regular rhythm, normal heart sounds and intact distal pulses.   No murmur heard.  Pulmonary/Chest: Effort normal and breath sounds normal. No stridor. No respiratory distress. He has no wheezes. He has no rales.   Abdominal: Soft. Bowel sounds are normal. He exhibits no distension. There is no tenderness. There is no guarding.   Musculoskeletal: Normal range of motion. He exhibits no edema or deformity.   Neurological: He is alert and oriented to person, place, and time. No cranial nerve deficit.   Skin: Skin is warm and dry. Capillary refill takes less than 2 seconds. No rash noted. He is not diaphoretic.   Psychiatric: He has a normal mood and affect. His behavior is normal. Judgment and thought content normal.   Nursing note and vitals reviewed.      Significant Labs: All pertinent labs within the past 24 hours have been reviewed.    Significant Imaging: I have reviewed all pertinent imaging results/findings within the past 24 hours.

## 2019-06-01 NOTE — PLAN OF CARE
Problem: Adult Inpatient Plan of Care  Goal: Plan of Care Review  Outcome: Ongoing (interventions implemented as appropriate)  POC reviewed, verbalized understanding. Pt remained free from falls, fall precautions in place. Pt is SR on monitor, 60-70s. VSS. Pt denies CP this shift. PIV intact and hep gtt infusing per orders. Call bell and personal belongings within reach. Hourly rounding complete. Reminded to call for assistance. Will continue to monitor.

## 2019-06-01 NOTE — PROGRESS NOTES
[facility] -   Cardiology  Progress Note    Patient Name: Oumar Mccarthy  MRN: [id number]  Admission Date: 5/30/2019  Hospital Length of Stay: 2 days  Code Status: Full Code   Attending Physician: Rodney Quezada, *   Primary Care Physician: Leana Troy MD  Expected Discharge Date:   Principal Problem:NSTEMI (non-ST elevated myocardial infarction)    Subjective:     HPI:  Pt presents with chest pain.  Pt admitted one week ago for NSTEMI and had LHC done by Dr. Rich 7 days ago and was angiographically normal, normal LVEF.  He has been having chest pain sxs over last 1.5 weeks, intermittently, and yesterday got worse again so went to [facility] ER.  ecg over last week shows NSR, inferior st-t abnl.    Consideration given to vasospasm and demand ischemia last week after cath was normal.  Troponin elevated in ER, now trending down.  Still with intermittent cp.  No acute dyspnea.    Nonsmoker.  No drug use.    Hospital Course:   6/1/19:  PT FEELS REMARKABLY IMPROVED TODAY.  DENIES CHEST PAIN SXS. NO CHF SXS.  HAS BEEN STARTED ON NORVASC AND NITRATES THIS ADMISSION.  CTA CHEST NO PE, NORMAL AORTA.  F/U ECHO SHOWED NORMAL LVEF, NO WMA, NORMAL VALVES.         Review of Systems   Constitution: Negative.   HENT: Negative.    Eyes: Negative.    Cardiovascular: Negative.    Respiratory: Negative.    Endocrine: Negative.    Hematologic/Lymphatic: Negative.    Skin: Negative.    Musculoskeletal: Negative.    Gastrointestinal: Negative.    Genitourinary: Negative.    Neurological: Negative.    Psychiatric/Behavioral: Negative.    Allergic/Immunologic: Negative.      Objective:     Vital Signs (Most Recent):  Temp: 98 °F (36.7 °C) (06/01/19 0742)  Pulse: 69 (06/01/19 0742)  Resp: 18 (06/01/19 0742)  BP: 115/60 (06/01/19 0742)  SpO2: 98 % (06/01/19 0742) Vital Signs (24h Range):  Temp:  [96.4 °F (35.8 °C)-99.1 °F (37.3 °C)] 98 °F (36.7 °C)  Pulse:  [61-77] 69  Resp:  [16-18] 18  SpO2:  [97 %-100 %] 98  %  BP: (106-118)/(56-65) 115/60     Weight: 100 kg (220 lb 7.4 oz)  Body mass index is 28.31 kg/m².     SpO2: 98 %  O2 Device (Oxygen Therapy): room air      Intake/Output Summary (Last 24 hours) at 6/1/2019 1222  Last data filed at 6/1/2019 0500  Gross per 24 hour   Intake 1643 ml   Output 600 ml   Net 1043 ml       Lines/Drains/Airways     Peripheral Intravenous Line                 Peripheral IV - Single Lumen 05/30/19 1425 18 G Right Antecubital 1 day         Peripheral IV - Single Lumen 05/30/19 1600 20 G Right Hand 1 day                Physical Exam   Constitutional: He is oriented to person, place, and time. He appears well-developed and well-nourished.   HENT:   Head: Normocephalic.   Neck: Normal range of motion. Neck supple. Normal carotid pulses, no hepatojugular reflux and no JVD present. Carotid bruit is not present. No thyromegaly present.   Cardiovascular: Normal rate, regular rhythm, S1 normal and S2 normal. PMI is not displaced. Exam reveals no S3, no S4, no distant heart sounds, no friction rub, no midsystolic click and no opening snap.   No murmur heard.  Pulses:       Radial pulses are 2+ on the right side, and 2+ on the left side.   Pulmonary/Chest: Effort normal and breath sounds normal. He has no wheezes. He has no rales.   Abdominal: Soft. Bowel sounds are normal. He exhibits no distension, no abdominal bruit, no ascites and no mass. There is no tenderness.   Musculoskeletal: He exhibits no edema.   Neurological: He is alert and oriented to person, place, and time.   Skin: Skin is warm.   Psychiatric: He has a normal mood and affect. His behavior is normal.   Nursing note and vitals reviewed.      Significant Labs:   BMP:   Recent Labs   Lab 05/30/19  1430 05/31/19  0404 06/01/19  0400   GLU 81 99 91    138 139   K 3.7 4.2 3.9    103 105   CO2 28 27 25   BUN 14 12 12   CREATININE 1.2 1.1 1.1   CALCIUM 10.0 9.5 9.6   MG  --  2.0  --    , CMP   Recent Labs   Lab 05/30/19  1430  05/31/19  0404 06/01/19  0400    138 139   K 3.7 4.2 3.9    103 105   CO2 28 27 25   GLU 81 99 91   BUN 14 12 12   CREATININE 1.2 1.1 1.1   CALCIUM 10.0 9.5 9.6   PROT 7.8  --   --    ALBUMIN 4.0  --   --    BILITOT 0.6  --   --    ALKPHOS 65  --   --    AST 35  --   --    ALT 25  --   --    ANIONGAP 9 8 9   ESTGFRAFRICA >60.0 >60 >60   EGFRNONAA >60.0 >60 >60   , CBC   Recent Labs   Lab 05/30/19  1430 05/31/19  0404 06/01/19  0400   WBC 5.13 6.60 4.51   HGB 14.4 13.0* 14.2   HCT 44.0 40.0 42.2    205 203   , INR   Recent Labs   Lab 05/30/19  1430 05/31/19  0404   INR 1.0 1.0    and Troponin   Recent Labs   Lab 05/30/19  2235 05/31/19  0404 06/01/19  0830   TROPONINI 6.394* 3.790* 2.136*       Significant Imaging: Echocardiogram:   2D echo with color flow doppler:   Results for orders placed or performed during the hospital encounter of 05/30/19   2D echo with color flow doppler   Result Value Ref Range    QEF 55 55 - 65    Diastolic Dysfunction No     Est. PA Systolic Pressure 15.25      Assessment and Plan:     NSTEMI SIMILAR TO ONE WEEK AGO.  HIS CATH HAS BEEN REVIEWED FROM 7 DAYS AGO AND IS ANGIOGRAPHICALLY NORMAL.  THIS WAS DONE FOR NSTEMI LAST WEEK.  CTA NONREVEALING.  F/U ECHO IS AGAIN NORMAL, NO VALVULAR CONDITIONS AND NO WMA.  WILL DIAGNOSE AND TREAT FOR POSSIBLE CORONARY VASOSPASM.  HIS SXS ARE MUCH IMPROVED WITH ADDITION OF NORVASC, NITRATES.  TROPONIN MUCH IMPROVED.  CHANGE TOPICAL NITRATES TO IMDUR.  NEEDS ONE YEAR OF DAPT.  CONTINUE OTHER MEDS.  CONTINUE IV HEPARIN GTT TODAY.  DISCUSSED ALL HIS CONDITIONS WITH PT, WIFE AND SISTER THIS ADMISSION AND PT SHOWN HIS ANGIOGRAM FILMS.  IF HE CONTINUES TO DO WELL, CAN GO HOME TOMORROW AND STOP HEPARIN IN AM.      * NSTEMI (non-ST elevated myocardial infarction)  See management plan detailed above.     Coronary vasospasm  See management plan detailed above.     Abnormal ECG  See management plan detailed above.     Hypertension  See management  plan detailed above.     Hyperlipidemia  See management plan detailed above.         VTE Risk Mitigation (From admission, onward)        Ordered     IP VTE HIGH RISK PATIENT  Once      05/30/19 2144     Place SARY hose  Until discontinued      05/30/19 2144     Place sequential compression device  Until discontinued      05/30/19 2144     Reason for No Pharmacological VTE Prophylaxis  Once      05/30/19 2144     heparin 25,000 units in dextrose 5% 250 mL (100 units/mL) infusion LOW INTENSITY nomogram - [facility]  Continuous      05/30/19 1546     heparin 25,000 units in dextrose 5% (100 units/ml) IV bolus from bag - ADDITIONAL PRN BOLUS - 60 units/kg (max bolus 4000 units)  As needed (PRN)      05/30/19 1546     heparin 25,000 units in dextrose 5% (100 units/ml) IV bolus from bag - ADDITIONAL PRN BOLUS - 30 units/kg (max bolus 4000 units)  As needed (PRN)      05/30/19 1546          Richmond Aguayo MD  Cardiology  [facility]

## 2019-06-01 NOTE — SUBJECTIVE & OBJECTIVE
Review of Systems   Constitution: Negative.   HENT: Negative.    Eyes: Negative.    Cardiovascular: Negative.    Respiratory: Negative.    Endocrine: Negative.    Hematologic/Lymphatic: Negative.    Skin: Negative.    Musculoskeletal: Negative.    Gastrointestinal: Negative.    Genitourinary: Negative.    Neurological: Negative.    Psychiatric/Behavioral: Negative.    Allergic/Immunologic: Negative.      Objective:     Vital Signs (Most Recent):  Temp: 98 °F (36.7 °C) (06/01/19 0742)  Pulse: 69 (06/01/19 0742)  Resp: 18 (06/01/19 0742)  BP: 115/60 (06/01/19 0742)  SpO2: 98 % (06/01/19 0742) Vital Signs (24h Range):  Temp:  [96.4 °F (35.8 °C)-99.1 °F (37.3 °C)] 98 °F (36.7 °C)  Pulse:  [61-77] 69  Resp:  [16-18] 18  SpO2:  [97 %-100 %] 98 %  BP: (106-118)/(56-65) 115/60     Weight: 100 kg (220 lb 7.4 oz)  Body mass index is 28.31 kg/m².     SpO2: 98 %  O2 Device (Oxygen Therapy): room air      Intake/Output Summary (Last 24 hours) at 6/1/2019 1222  Last data filed at 6/1/2019 0500  Gross per 24 hour   Intake 1643 ml   Output 600 ml   Net 1043 ml       Lines/Drains/Airways     Peripheral Intravenous Line                 Peripheral IV - Single Lumen 05/30/19 1425 18 G Right Antecubital 1 day         Peripheral IV - Single Lumen 05/30/19 1600 20 G Right Hand 1 day                Physical Exam   Constitutional: He is oriented to person, place, and time. He appears well-developed and well-nourished.   HENT:   Head: Normocephalic.   Neck: Normal range of motion. Neck supple. Normal carotid pulses, no hepatojugular reflux and no JVD present. Carotid bruit is not present. No thyromegaly present.   Cardiovascular: Normal rate, regular rhythm, S1 normal and S2 normal. PMI is not displaced. Exam reveals no S3, no S4, no distant heart sounds, no friction rub, no midsystolic click and no opening snap.   No murmur heard.  Pulses:       Radial pulses are 2+ on the right side, and 2+ on the left side.   Pulmonary/Chest: Effort  normal and breath sounds normal. He has no wheezes. He has no rales.   Abdominal: Soft. Bowel sounds are normal. He exhibits no distension, no abdominal bruit, no ascites and no mass. There is no tenderness.   Musculoskeletal: He exhibits no edema.   Neurological: He is alert and oriented to person, place, and time.   Skin: Skin is warm.   Psychiatric: He has a normal mood and affect. His behavior is normal.   Nursing note and vitals reviewed.      Significant Labs:   BMP:   Recent Labs   Lab 05/30/19 1430 05/31/19 0404 06/01/19  0400   GLU 81 99 91    138 139   K 3.7 4.2 3.9    103 105   CO2 28 27 25   BUN 14 12 12   CREATININE 1.2 1.1 1.1   CALCIUM 10.0 9.5 9.6   MG  --  2.0  --    , CMP   Recent Labs   Lab 05/30/19 1430 05/31/19 0404 06/01/19  0400    138 139   K 3.7 4.2 3.9    103 105   CO2 28 27 25   GLU 81 99 91   BUN 14 12 12   CREATININE 1.2 1.1 1.1   CALCIUM 10.0 9.5 9.6   PROT 7.8  --   --    ALBUMIN 4.0  --   --    BILITOT 0.6  --   --    ALKPHOS 65  --   --    AST 35  --   --    ALT 25  --   --    ANIONGAP 9 8 9   ESTGFRAFRICA >60.0 >60 >60   EGFRNONAA >60.0 >60 >60   , CBC   Recent Labs   Lab 05/30/19  1430 05/31/19 0404 06/01/19  0400   WBC 5.13 6.60 4.51   HGB 14.4 13.0* 14.2   HCT 44.0 40.0 42.2    205 203   , INR   Recent Labs   Lab 05/30/19  1430 05/31/19  0404   INR 1.0 1.0    and Troponin   Recent Labs   Lab 05/30/19 2235 05/31/19 0404 06/01/19  0830   TROPONINI 6.394* 3.790* 2.136*       Significant Imaging: Echocardiogram:   2D echo with color flow doppler:   Results for orders placed or performed during the hospital encounter of 05/30/19   2D echo with color flow doppler   Result Value Ref Range    QEF 55 55 - 65    Diastolic Dysfunction No     Est. PA Systolic Pressure 15.25

## 2019-06-02 LAB
ANION GAP SERPL CALC-SCNC: 7 MMOL/L (ref 8–16)
APTT BLDCRRT: 52.9 SEC (ref 21–32)
BASOPHILS # BLD AUTO: 0.01 K/UL (ref 0–0.2)
BASOPHILS NFR BLD: 0.2 % (ref 0–1.9)
BUN SERPL-MCNC: 11 MG/DL (ref 6–20)
CALCIUM SERPL-MCNC: 10 MG/DL (ref 8.7–10.5)
CHLORIDE SERPL-SCNC: 102 MMOL/L (ref 95–110)
CO2 SERPL-SCNC: 30 MMOL/L (ref 23–29)
CREAT SERPL-MCNC: 1.3 MG/DL (ref 0.5–1.4)
DIFFERENTIAL METHOD: ABNORMAL
EOSINOPHIL # BLD AUTO: 0.1 K/UL (ref 0–0.5)
EOSINOPHIL NFR BLD: 3.1 % (ref 0–8)
ERYTHROCYTE [DISTWIDTH] IN BLOOD BY AUTOMATED COUNT: 13.4 % (ref 11.5–14.5)
EST. GFR  (AFRICAN AMERICAN): >60 ML/MIN/1.73 M^2
EST. GFR  (NON AFRICAN AMERICAN): >60 ML/MIN/1.73 M^2
GLUCOSE SERPL-MCNC: 82 MG/DL (ref 70–110)
HCT VFR BLD AUTO: 41.7 % (ref 40–54)
HGB BLD-MCNC: 13.8 G/DL (ref 14–18)
LYMPHOCYTES # BLD AUTO: 1.9 K/UL (ref 1–4.8)
LYMPHOCYTES NFR BLD: 43.2 % (ref 18–48)
MCH RBC QN AUTO: 28.5 PG (ref 27–31)
MCHC RBC AUTO-ENTMCNC: 33.1 G/DL (ref 32–36)
MCV RBC AUTO: 86 FL (ref 82–98)
MONOCYTES # BLD AUTO: 0.4 K/UL (ref 0.3–1)
MONOCYTES NFR BLD: 8.1 % (ref 4–15)
NEUTROPHILS # BLD AUTO: 2 K/UL (ref 1.8–7.7)
NEUTROPHILS NFR BLD: 45.4 % (ref 38–73)
PLATELET # BLD AUTO: 205 K/UL (ref 150–350)
PMV BLD AUTO: 10.2 FL (ref 9.2–12.9)
POTASSIUM SERPL-SCNC: 4 MMOL/L (ref 3.5–5.1)
RBC # BLD AUTO: 4.85 M/UL (ref 4.6–6.2)
SODIUM SERPL-SCNC: 139 MMOL/L (ref 136–145)
TROPONIN I SERPL DL<=0.01 NG/ML-MCNC: 1.81 NG/ML (ref 0–0.03)
WBC # BLD AUTO: 4.47 K/UL (ref 3.9–12.7)

## 2019-06-02 PROCEDURE — 93010 EKG 12-LEAD: ICD-10-PCS | Mod: ,,, | Performed by: INTERNAL MEDICINE

## 2019-06-02 PROCEDURE — 93005 ELECTROCARDIOGRAM TRACING: CPT

## 2019-06-02 PROCEDURE — 93010 ELECTROCARDIOGRAM REPORT: CPT | Mod: ,,, | Performed by: INTERNAL MEDICINE

## 2019-06-02 PROCEDURE — 85025 COMPLETE CBC W/AUTO DIFF WBC: CPT

## 2019-06-02 PROCEDURE — 25000003 PHARM REV CODE 250: Performed by: EMERGENCY MEDICINE

## 2019-06-02 PROCEDURE — 85730 THROMBOPLASTIN TIME PARTIAL: CPT

## 2019-06-02 PROCEDURE — 63600175 PHARM REV CODE 636 W HCPCS: Performed by: NURSE PRACTITIONER

## 2019-06-02 PROCEDURE — 25000003 PHARM REV CODE 250: Performed by: INTERNAL MEDICINE

## 2019-06-02 PROCEDURE — 93041 RHYTHM ECG TRACING: CPT

## 2019-06-02 PROCEDURE — 99233 SBSQ HOSP IP/OBS HIGH 50: CPT | Mod: ,,, | Performed by: INTERNAL MEDICINE

## 2019-06-02 PROCEDURE — 21400001 HC TELEMETRY ROOM

## 2019-06-02 PROCEDURE — 36415 COLL VENOUS BLD VENIPUNCTURE: CPT

## 2019-06-02 PROCEDURE — 84484 ASSAY OF TROPONIN QUANT: CPT

## 2019-06-02 PROCEDURE — 80048 BASIC METABOLIC PNL TOTAL CA: CPT

## 2019-06-02 PROCEDURE — 63600175 PHARM REV CODE 636 W HCPCS: Performed by: INTERNAL MEDICINE

## 2019-06-02 PROCEDURE — 25000003 PHARM REV CODE 250: Performed by: NURSE PRACTITIONER

## 2019-06-02 PROCEDURE — 99233 PR SUBSEQUENT HOSPITAL CARE,LEVL III: ICD-10-PCS | Mod: ,,, | Performed by: INTERNAL MEDICINE

## 2019-06-02 RX ORDER — SODIUM CHLORIDE 9 MG/ML
INJECTION, SOLUTION INTRAVENOUS CONTINUOUS
Status: DISCONTINUED | OUTPATIENT
Start: 2019-06-02 | End: 2019-06-02

## 2019-06-02 RX ORDER — ENOXAPARIN SODIUM 100 MG/ML
40 INJECTION SUBCUTANEOUS EVERY 24 HOURS
Status: DISCONTINUED | OUTPATIENT
Start: 2019-06-02 | End: 2019-06-04 | Stop reason: HOSPADM

## 2019-06-02 RX ORDER — ISOSORBIDE MONONITRATE 30 MG/1
30 TABLET, EXTENDED RELEASE ORAL 2 TIMES DAILY
Status: DISCONTINUED | OUTPATIENT
Start: 2019-06-02 | End: 2019-06-04 | Stop reason: HOSPADM

## 2019-06-02 RX ORDER — SODIUM CHLORIDE 9 MG/ML
INJECTION, SOLUTION INTRAVENOUS CONTINUOUS
Status: DISCONTINUED | OUTPATIENT
Start: 2019-06-02 | End: 2019-06-04 | Stop reason: HOSPADM

## 2019-06-02 RX ADMIN — ENOXAPARIN SODIUM 40 MG: 100 INJECTION SUBCUTANEOUS at 04:06

## 2019-06-02 RX ADMIN — AMLODIPINE BESYLATE 5 MG: 5 TABLET ORAL at 08:06

## 2019-06-02 RX ADMIN — RANOLAZINE 500 MG: 500 TABLET, FILM COATED, EXTENDED RELEASE ORAL at 08:06

## 2019-06-02 RX ADMIN — ISOSORBIDE MONONITRATE 30 MG: 30 TABLET, EXTENDED RELEASE ORAL at 08:06

## 2019-06-02 RX ADMIN — ATORVASTATIN CALCIUM 40 MG: 40 TABLET, FILM COATED ORAL at 08:06

## 2019-06-02 RX ADMIN — ASPIRIN 81 MG: 81 TABLET, COATED ORAL at 08:06

## 2019-06-02 RX ADMIN — METOPROLOL TARTRATE 25 MG: 25 TABLET ORAL at 08:06

## 2019-06-02 RX ADMIN — PANTOPRAZOLE SODIUM 40 MG: 40 TABLET, DELAYED RELEASE ORAL at 08:06

## 2019-06-02 RX ADMIN — CLOPIDOGREL BISULFATE 75 MG: 75 TABLET ORAL at 08:06

## 2019-06-02 NOTE — PLAN OF CARE
Problem: Adult Inpatient Plan of Care  Goal: Plan of Care Review  Outcome: Ongoing (interventions implemented as appropriate)  POC reviewed, verbalized understanding. Pt remained free from falls, fall precautions in place. Pt is SR on monitor, 60-80s. VSS. Had c/o chest pain this shift with NP notified and new orders. PIV intact with hep gtt infusing. Call bell and personal belongings within reach. Hourly rounding complete. Reminded to call for assistance. Will continue to monitor.

## 2019-06-02 NOTE — ASSESSMENT & PLAN NOTE
-Cardiology  On board  -Chest CTA did not show any acute abnormalities   -Per cardiology no [facility] at this time   - The TTE did not show any  acute finding   -s/p heparin  Drip   -Cont asa , Plavix , bb  , imdur and ccb

## 2019-06-02 NOTE — PROGRESS NOTES
[facility] Hospital Medicine  Progress Note    Patient Name: Oumar Mccarthy  MRN: [id number]  Patient Class: IP- Inpatient   Admission Date: 5/30/2019  Length of Stay: 3 days  Attending Physician: Rodney Quezada, *  Primary Care Provider: Leana Troy MD        Subjective:     Principal Problem:NSTEMI (non-ST elevated myocardial infarction)    HPI:  33 year old male presents as a trasnfer from outside freestanding ER for complaint of Chest Pain. Patient evaluated in the Er. Troponin 6.819. Started on Heparin drip. HM consulted, patient accepted and transferred for admission. Patient seen and examined. Patient reports pain intermittent to left side with radiation to left arm over the past week. Patient reports at this time currently pain free. No modifying factors. No associated symptoms. Prior treatment: ASA, also patient seen/admitted for similar complaint with having a normal heart cath 5/24/19. See chart review for more details.     Hospital Course:  5/31 Pt was seen and examined at bedside . He cont with mild chest pain .Cardiology was consulted and recommend to cont Heparin drip . The  Chest CTA did not show any acute finding / The TTE did not show any acute finding .   6/1 Pt was seen and examined at bedside . He  Denies any complaint for today . Cardiology recommend to cont heparin drip  dffor today .  Cardiology add Imdur and Norvasc . The troponin level are trending down . There no indication for a LHC at this time  Per cardiology . The Chest CTA did nto show any acute finding .   6/2 Pt was seen and examined at bedside . He had a chest pain episode  Last night . The troponin level is trending down . The heparin drip was d/c .  The Imdur was changed to 2 time a day .      Interval History: \    Review of Systems   Constitutional: Negative for chills, diaphoresis, fatigue and fever.   HENT: Negative for congestion, sore throat and voice change.    Eyes: Negative for  photophobia and visual disturbance.   Respiratory: Negative for cough, shortness of breath, wheezing and stridor.    Cardiovascular: Negative for chest pain and leg swelling.   Gastrointestinal: Negative for abdominal distention, abdominal pain, constipation, diarrhea, nausea and vomiting.   Endocrine: Negative for polydipsia, polyphagia and polyuria.   Genitourinary: Negative for difficulty urinating, dysuria, flank pain, testicular pain and urgency.   Musculoskeletal: Negative for back pain, joint swelling, neck pain and neck stiffness.   Skin: Negative for color change and rash.   Allergic/Immunologic: Negative for immunocompromised state.   Neurological: Negative for dizziness, syncope, weakness, numbness and headaches.   Hematological: Does not bruise/bleed easily.   Psychiatric/Behavioral: Negative for agitation, behavioral problems and confusion.     Objective:     Vital Signs (Most Recent):  Temp: 97.7 °F (36.5 °C) (06/02/19 1154)  Pulse: 67 (06/02/19 1154)  Resp: 18 (06/02/19 1154)  BP: 108/61 (06/02/19 1154)  SpO2: 100 % (06/02/19 1154) Vital Signs (24h Range):  Temp:  [97.7 °F (36.5 °C)-98.9 °F (37.2 °C)] 97.7 °F (36.5 °C)  Pulse:  [62-85] 67  Resp:  [16-18] 18  SpO2:  [99 %-100 %] 100 %  BP: (107-121)/(57-72) 108/61     Weight: 100 kg (220 lb 7.4 oz)  Body mass index is 28.31 kg/m².    Intake/Output Summary (Last 24 hours) at 6/2/2019 1417  Last data filed at 6/2/2019 1024  Gross per 24 hour   Intake 355.7 ml   Output 900 ml   Net -544.3 ml      Physical Exam   Constitutional: He is oriented to person, place, and time. He appears well-developed and well-nourished. No distress.   HENT:   Head: Normocephalic and atraumatic.   Nose: Nose normal.   Eyes: Pupils are equal, round, and reactive to light. Conjunctivae and EOM are normal. No scleral icterus.   Neck: Normal range of motion. Neck supple. No tracheal deviation present.   Cardiovascular: Normal rate, regular rhythm, normal heart sounds and intact  distal pulses.   No murmur heard.  Pulmonary/Chest: Effort normal and breath sounds normal. No stridor. No respiratory distress. He has no wheezes. He has no rales.   Abdominal: Soft. Bowel sounds are normal. He exhibits no distension. There is no tenderness. There is no guarding.   Musculoskeletal: Normal range of motion. He exhibits no edema or deformity.   Neurological: He is alert and oriented to person, place, and time. No cranial nerve deficit.   Skin: Skin is warm and dry. Capillary refill takes less than 2 seconds. No rash noted. He is not diaphoretic.   Psychiatric: He has a normal mood and affect. His behavior is normal. Judgment and thought content normal.   Nursing note and vitals reviewed.      Significant Labs: All pertinent labs within the past 24 hours have been reviewed.    Significant Imaging: I have reviewed all pertinent imaging results/findings within the past 24 hours.    Assessment/Plan:      * NSTEMI (non-ST elevated myocardial infarction)  -Cardiology  On board  -Chest CTA did not show any acute abnormalities   -Per cardiology no [facility] at this time   - The TTE did not show any  acute finding   -s/p heparin  Drip   -Cont asa , Plavix , bb  , imdur and ccb       Coronary vasospasm  started on Imdur and Norvasc  Cont asa and plavix        Abnormal ECG        Hypertension  Continue home meds  Monitor tends          Hyperlipidemia  Statin          VTE Risk Mitigation (From admission, onward)        Ordered     enoxaparin injection 40 mg  Daily      06/02/19 1342     IP VTE HIGH RISK PATIENT  Once      05/30/19 2144     Place SARY hose  Until discontinued      05/30/19 2144     Place sequential compression device  Until discontinued      05/30/19 2144     Reason for No Pharmacological VTE Prophylaxis  Once      05/30/19 2144              Rodney Quezada MD  Department of Hospital Medicine   [facility]

## 2019-06-02 NOTE — SUBJECTIVE & OBJECTIVE
Review of Systems   Constitution: Negative.   HENT: Negative.    Eyes: Negative.    Cardiovascular: Positive for chest pain.   Respiratory: Negative.    Endocrine: Negative.    Hematologic/Lymphatic: Negative.    Skin: Negative.    Musculoskeletal: Negative.    Gastrointestinal: Negative.    Genitourinary: Negative.    Neurological: Negative.    Psychiatric/Behavioral: Negative.    Allergic/Immunologic: Negative.      Objective:     Vital Signs (Most Recent):  Temp: 97.7 °F (36.5 °C) (06/02/19 1154)  Pulse: 67 (06/02/19 1154)  Resp: 18 (06/02/19 1154)  BP: 108/61 (06/02/19 1154)  SpO2: 100 % (06/02/19 1154) Vital Signs (24h Range):  Temp:  [97.7 °F (36.5 °C)-98.9 °F (37.2 °C)] 97.7 °F (36.5 °C)  Pulse:  [62-85] 67  Resp:  [16-18] 18  SpO2:  [99 %-100 %] 100 %  BP: (107-121)/(57-72) 108/61     Weight: 100 kg (220 lb 7.4 oz)  Body mass index is 28.31 kg/m².     SpO2: 100 %  O2 Device (Oxygen Therapy): room air      Intake/Output Summary (Last 24 hours) at 6/2/2019 1345  Last data filed at 6/2/2019 1024  Gross per 24 hour   Intake 355.7 ml   Output 1500 ml   Net -1144.3 ml       Lines/Drains/Airways     Peripheral Intravenous Line                 Peripheral IV - Single Lumen 05/30/19 1600 20 G Right Hand 2 days                Physical Exam   Constitutional: He is oriented to person, place, and time. He appears well-developed and well-nourished.   HENT:   Head: Normocephalic.   Neck: Normal range of motion. Neck supple. Normal carotid pulses, no hepatojugular reflux and no JVD present. Carotid bruit is not present. No thyromegaly present.   Cardiovascular: Normal rate, regular rhythm, S1 normal and S2 normal. PMI is not displaced. Exam reveals no S3, no S4, no distant heart sounds, no friction rub, no midsystolic click and no opening snap.   No murmur heard.  Pulses:       Radial pulses are 2+ on the right side, and 2+ on the left side.   Pulmonary/Chest: Effort normal and breath sounds normal. He has no wheezes. He  has no rales.   Abdominal: Soft. Bowel sounds are normal. He exhibits no distension, no abdominal bruit, no ascites and no mass. There is no tenderness.   Musculoskeletal: He exhibits no edema.   Neurological: He is alert and oriented to person, place, and time.   Skin: Skin is warm.   Psychiatric: He has a normal mood and affect. His behavior is normal.   Nursing note and vitals reviewed.      Significant Labs:   BMP:   Recent Labs   Lab 06/01/19  0400 06/02/19  0403   GLU 91 82    139   K 3.9 4.0    102   CO2 25 30*   BUN 12 11   CREATININE 1.1 1.3   CALCIUM 9.6 10.0   , CMP   Recent Labs   Lab 06/01/19  0400 06/02/19  0403    139   K 3.9 4.0    102   CO2 25 30*   GLU 91 82   BUN 12 11   CREATININE 1.1 1.3   CALCIUM 9.6 10.0   ANIONGAP 9 7*   ESTGFRAFRICA >60 >60   EGFRNONAA >60 >60   , CBC   Recent Labs   Lab 06/01/19  0400 06/02/19  0403   WBC 4.51 4.47   HGB 14.2 13.8*   HCT 42.2 41.7    205   , INR No results for input(s): INR, PROTIME in the last 48 hours. and Troponin   Recent Labs   Lab 06/01/19  0830 06/02/19  1259   TROPONINI 2.136* 1.815*       Significant Imaging: Echocardiogram:   2D echo with color flow doppler:   Results for orders placed or performed during the hospital encounter of 05/30/19   2D echo with color flow doppler   Result Value Ref Range    QEF 55 55 - 65    Diastolic Dysfunction No     Est. PA Systolic Pressure 15.25

## 2019-06-02 NOTE — PLAN OF CARE
Problem: Adult Inpatient Plan of Care  Goal: Plan of Care Review  Outcome: Ongoing (interventions implemented as appropriate)  AAO X4. VSS. NSR on monitor. Heparin gtt.   No complaints of pain during the shift. Family at bedside.   Fall precautions in place, call bell in reach, bed in low and locked position.   POC discussed w/, verbalized understanding. Will continue to monitor.

## 2019-06-02 NOTE — PROGRESS NOTES
[facility]   Cardiology  Progress Note    Patient Name: Oumar Mccarthy  MRN: [id number]  Admission Date: 5/30/2019  Hospital Length of Stay: 3 days  Code Status: Full Code   Attending Physician: Rodney Quezada, *   Primary Care Physician: Leana Troy MD  Expected Discharge Date:   Principal Problem:NSTEMI (non-ST elevated myocardial infarction)    Subjective:     HPI:  Pt presents with chest pain.  Pt admitted one week ago for NSTEMI and had LHC done by Dr. Rich 7 days ago and was angiographically normal, normal LVEF.  He has been having chest pain sxs over last 1.5 weeks, intermittently, and yesterday got worse again so went to [facility] ER.  ecg over last week shows NSR, inferior st-t abnl.    Consideration given to vasospasm and demand ischemia last week after cath was normal.  Troponin elevated in ER, now trending down.  Still with intermittent cp.  No acute dyspnea.    Nonsmoker.  No drug use.    Hospital Course:   6/1/19:  PT FEELS REMARKABLY IMPROVED TODAY.  DENIES CHEST PAIN SXS. NO CHF SXS.  HAS BEEN STARTED ON NORVASC AND NITRATES THIS ADMISSION.  CTA CHEST NO PE, NORMAL AORTA.  F/U ECHO SHOWED NORMAL LVEF, NO WMA, NORMAL VALVES.     6/2/19:  PT HAD EPISODES OF SHARP CP LAST NIGHT THEN RESOLVED.  HE FELL ASLEEP PER PT AND HAS NOT HAD RECURRENT CP TODAY.  NO DYSPNEA. HE HAS NO COMPLAINTS AT TIME OF VISIT THIS AFTERNOON.  TROPONIN RECHECKED AND CONTINUES TO TREND DOWN AND ECG TODAY SHOWS NSR, INFERIOR ST-T ABNORMALITY NOTED ON ALL ECGS OVER LAST 10 DAYS.        Review of Systems   Constitution: Negative.   HENT: Negative.    Eyes: Negative.    Cardiovascular: Positive for chest pain.   Respiratory: Negative.    Endocrine: Negative.    Hematologic/Lymphatic: Negative.    Skin: Negative.    Musculoskeletal: Negative.    Gastrointestinal: Negative.    Genitourinary: Negative.    Neurological: Negative.    Psychiatric/Behavioral: Negative.    Allergic/Immunologic: Negative.       Objective:     Vital Signs (Most Recent):  Temp: 97.7 °F (36.5 °C) (06/02/19 1154)  Pulse: 67 (06/02/19 1154)  Resp: 18 (06/02/19 1154)  BP: 108/61 (06/02/19 1154)  SpO2: 100 % (06/02/19 1154) Vital Signs (24h Range):  Temp:  [97.7 °F (36.5 °C)-98.9 °F (37.2 °C)] 97.7 °F (36.5 °C)  Pulse:  [62-85] 67  Resp:  [16-18] 18  SpO2:  [99 %-100 %] 100 %  BP: (107-121)/(57-72) 108/61     Weight: 100 kg (220 lb 7.4 oz)  Body mass index is 28.31 kg/m².     SpO2: 100 %  O2 Device (Oxygen Therapy): room air      Intake/Output Summary (Last 24 hours) at 6/2/2019 1345  Last data filed at 6/2/2019 1024  Gross per 24 hour   Intake 355.7 ml   Output 1500 ml   Net -1144.3 ml       Lines/Drains/Airways     Peripheral Intravenous Line                 Peripheral IV - Single Lumen 05/30/19 1600 20 G Right Hand 2 days                Physical Exam   Constitutional: He is oriented to person, place, and time. He appears well-developed and well-nourished.   HENT:   Head: Normocephalic.   Neck: Normal range of motion. Neck supple. Normal carotid pulses, no hepatojugular reflux and no JVD present. Carotid bruit is not present. No thyromegaly present.   Cardiovascular: Normal rate, regular rhythm, S1 normal and S2 normal. PMI is not displaced. Exam reveals no S3, no S4, no distant heart sounds, no friction rub, no midsystolic click and no opening snap.   No murmur heard.  Pulses:       Radial pulses are 2+ on the right side, and 2+ on the left side.   Pulmonary/Chest: Effort normal and breath sounds normal. He has no wheezes. He has no rales.   Abdominal: Soft. Bowel sounds are normal. He exhibits no distension, no abdominal bruit, no ascites and no mass. There is no tenderness.   Musculoskeletal: He exhibits no edema.   Neurological: He is alert and oriented to person, place, and time.   Skin: Skin is warm.   Psychiatric: He has a normal mood and affect. His behavior is normal.   Nursing note and vitals reviewed.      Significant Labs:    BMP:   Recent Labs   Lab 06/01/19  0400 06/02/19  0403   GLU 91 82    139   K 3.9 4.0    102   CO2 25 30*   BUN 12 11   CREATININE 1.1 1.3   CALCIUM 9.6 10.0   , CMP   Recent Labs   Lab 06/01/19  0400 06/02/19  0403    139   K 3.9 4.0    102   CO2 25 30*   GLU 91 82   BUN 12 11   CREATININE 1.1 1.3   CALCIUM 9.6 10.0   ANIONGAP 9 7*   ESTGFRAFRICA >60 >60   EGFRNONAA >60 >60   , CBC   Recent Labs   Lab 06/01/19  0400 06/02/19  0403   WBC 4.51 4.47   HGB 14.2 13.8*   HCT 42.2 41.7    205   , INR No results for input(s): INR, PROTIME in the last 48 hours. and Troponin   Recent Labs   Lab 06/01/19  0830 06/02/19  1259   TROPONINI 2.136* 1.815*       Significant Imaging: Echocardiogram:   2D echo with color flow doppler:   Results for orders placed or performed during the hospital encounter of 05/30/19   2D echo with color flow doppler   Result Value Ref Range    QEF 55 55 - 65    Diastolic Dysfunction No     Est. PA Systolic Pressure 15.25      Assessment and Plan:       RECURRENT NSTEMI IN PATIENT WITH ANGIOGRAPHICALLY NORMAL CORONARY ARTERIES ON [facility] 5/24/19 BY DR. MCKINLEY.  CTA CHEST WAS DONE ON ADMIT TO EVALUATE FOR ANY OTHER ETIOLOGY BUT CTA WAS NONREVEALING.  F/U ECHO SHOWED NORMAL EF, NO WMA, NORMAL VALVES.  PRESUMED DX IS POSSIBLE CORONARY VASOSPASM.      DISCUSSED VASOSPASM MGT WITH PT AND HIS WIFE, SISTER AND SISTER-IN-LAW ON MULTIPLE VISITS/CONVERSATIONS.  LONG CONVERSATION TODAY ON PHONE WITH HIS SISTER-IN-LAW, WHO IS A LOCAL PEDIATRICIAN, AND EXPRESSED CONCERNS OVER PATIENT'S CONDITION AND TREATMENT PLAN PARTICULARLY INVOLVING BETA-BLOCKER THERAPY WHICH WAS STARTED FOR NSTEMI PRESENTATION A WEEK + AGO AND SHE ADVOCATED FOR PT TO BE OFF BETA-BLOCKER THERAPY (IN CASE IT WAS MAKING POSSIBLE VASOSPASM WORSE).  I ANSWERED ALL  HER QUESTIONS ON PHONE IN FRONT OF PATIENT AND DETAILED ALL MGT PLANS FOR PT AT PRESENT TIME AND IN NEAR FUTURE.  THIS INVOLVES KEEPING PT IN HOSPITAL  LONGER DURATION AND POSSIBLE REPEAT CATH DUE TO INTERMITTENT CP SXS TO REASSESS HIS ANATOMY; HOWEVER, WAS NORMAL A WEEK AGO AND UNLIKELY TO BE CHANGED.    INCREASE IMDUR TO 30 MG BID.  MAY STOP RANEXA AND METOPROLOL FOR NOW AND MAXIMIZE DOSES OF IMDUR/NORVASC.  CONTINUE NORVASC STARTED YESTERDAY FOR POSSIBLE VASOSPASM.  ALLOW MEDS MORE TIME TO WORK.  MAY STOP IV HEPARIN GTT SINCE HE HAS BEEN ON > 48 HOURS AND TROPONIN TRENDING DOWN STILL.  CONTINUE ASA, PLAVIX.  NPO AFTER MIDNIGHT, IV FLUIDS.  REPEAT CATH TOMORROW PROBABLY NEEDED TO PROVE HIS CORONARY ARTERIES REMAIN NORMAL IN LIGHT OF CURRENT SITUATION/EVENTS.          * NSTEMI (non-ST elevated myocardial infarction)  See management plan detailed above.     Coronary vasospasm  See management plan detailed above.     Abnormal ECG  See management plan detailed above.     Hypertension  See management plan detailed above.     Hyperlipidemia  See management plan detailed above.         VTE Risk Mitigation (From admission, onward)        Ordered     enoxaparin injection 40 mg  Daily      06/02/19 1342     IP VTE HIGH RISK PATIENT  Once      05/30/19 2144     Place SARY hose  Until discontinued      05/30/19 2144     Place sequential compression device  Until discontinued      05/30/19 2144     Reason for No Pharmacological VTE Prophylaxis  Once      05/30/19 2144          Richmond Aguayo MD  Cardiology  [facility]

## 2019-06-02 NOTE — PLAN OF CARE
Problem: Adult Inpatient Plan of Care  Goal: Plan of Care Review  Outcome: Ongoing (interventions implemented as appropriate)  AAO X4. VSS. NSR on monitor. IV saline locked.   No complaints of pain during the shift. Possible LHC in AM.  Fall precautions in place, call bell in reach, bed in low and locked position.   POC discussed w/, verbalized understanding. Will continue to monitor.

## 2019-06-03 PROBLEM — N17.9 AKI (ACUTE KIDNEY INJURY): Status: ACTIVE | Noted: 2019-06-03

## 2019-06-03 LAB
ANION GAP SERPL CALC-SCNC: 7 MMOL/L (ref 8–16)
BASOPHILS # BLD AUTO: 0.01 K/UL (ref 0–0.2)
BASOPHILS NFR BLD: 0.3 % (ref 0–1.9)
BUN SERPL-MCNC: 16 MG/DL (ref 6–20)
CALCIUM SERPL-MCNC: 9.8 MG/DL (ref 8.7–10.5)
CHLORIDE SERPL-SCNC: 104 MMOL/L (ref 95–110)
CO2 SERPL-SCNC: 27 MMOL/L (ref 23–29)
CREAT SERPL-MCNC: 1.5 MG/DL (ref 0.5–1.4)
DIFFERENTIAL METHOD: ABNORMAL
EOSINOPHIL # BLD AUTO: 0.1 K/UL (ref 0–0.5)
EOSINOPHIL NFR BLD: 3.7 % (ref 0–8)
ERYTHROCYTE [DISTWIDTH] IN BLOOD BY AUTOMATED COUNT: 13 % (ref 11.5–14.5)
ERYTHROCYTE [SEDIMENTATION RATE] IN BLOOD BY WESTERGREN METHOD: 18 MM/HR (ref 0–10)
EST. GFR  (AFRICAN AMERICAN): >60 ML/MIN/1.73 M^2
EST. GFR  (NON AFRICAN AMERICAN): 59 ML/MIN/1.73 M^2
GLUCOSE SERPL-MCNC: 95 MG/DL (ref 70–110)
HCT VFR BLD AUTO: 40.8 % (ref 40–54)
HGB BLD-MCNC: 13.4 G/DL (ref 14–18)
LYMPHOCYTES # BLD AUTO: 1 K/UL (ref 1–4.8)
LYMPHOCYTES NFR BLD: 27.7 % (ref 18–48)
MCH RBC QN AUTO: 28.5 PG (ref 27–31)
MCHC RBC AUTO-ENTMCNC: 32.8 G/DL (ref 32–36)
MCV RBC AUTO: 87 FL (ref 82–98)
MONOCYTES # BLD AUTO: 0.4 K/UL (ref 0.3–1)
MONOCYTES NFR BLD: 9.6 % (ref 4–15)
NEUTROPHILS # BLD AUTO: 2.2 K/UL (ref 1.8–7.7)
NEUTROPHILS NFR BLD: 58.7 % (ref 38–73)
PLATELET # BLD AUTO: 197 K/UL (ref 150–350)
PMV BLD AUTO: 10.3 FL (ref 9.2–12.9)
POTASSIUM SERPL-SCNC: 3.8 MMOL/L (ref 3.5–5.1)
RBC # BLD AUTO: 4.7 M/UL (ref 4.6–6.2)
SODIUM SERPL-SCNC: 138 MMOL/L (ref 136–145)
WBC # BLD AUTO: 3.76 K/UL (ref 3.9–12.7)

## 2019-06-03 PROCEDURE — 25000003 PHARM REV CODE 250: Performed by: PHYSICIAN ASSISTANT

## 2019-06-03 PROCEDURE — 25000003 PHARM REV CODE 250: Performed by: INTERNAL MEDICINE

## 2019-06-03 PROCEDURE — 85651 RBC SED RATE NONAUTOMATED: CPT

## 2019-06-03 PROCEDURE — 94760 N-INVAS EAR/PLS OXIMETRY 1: CPT

## 2019-06-03 PROCEDURE — 21400001 HC TELEMETRY ROOM

## 2019-06-03 PROCEDURE — 80048 BASIC METABOLIC PNL TOTAL CA: CPT

## 2019-06-03 PROCEDURE — 99232 PR SUBSEQUENT HOSPITAL CARE,LEVL II: ICD-10-PCS | Mod: ,,, | Performed by: INTERNAL MEDICINE

## 2019-06-03 PROCEDURE — 85025 COMPLETE CBC W/AUTO DIFF WBC: CPT

## 2019-06-03 PROCEDURE — 25000003 PHARM REV CODE 250: Performed by: NURSE PRACTITIONER

## 2019-06-03 PROCEDURE — 36415 COLL VENOUS BLD VENIPUNCTURE: CPT

## 2019-06-03 PROCEDURE — 63600175 PHARM REV CODE 636 W HCPCS: Performed by: INTERNAL MEDICINE

## 2019-06-03 PROCEDURE — 25000003 PHARM REV CODE 250: Performed by: EMERGENCY MEDICINE

## 2019-06-03 PROCEDURE — 99232 SBSQ HOSP IP/OBS MODERATE 35: CPT | Mod: ,,, | Performed by: INTERNAL MEDICINE

## 2019-06-03 RX ORDER — ACETAMINOPHEN 325 MG/1
650 TABLET ORAL EVERY 6 HOURS PRN
Status: DISCONTINUED | OUTPATIENT
Start: 2019-06-03 | End: 2019-06-04 | Stop reason: HOSPADM

## 2019-06-03 RX ORDER — COLCHICINE 0.6 MG/1
0.6 TABLET, FILM COATED ORAL 2 TIMES DAILY
Status: DISCONTINUED | OUTPATIENT
Start: 2019-06-03 | End: 2019-06-04 | Stop reason: HOSPADM

## 2019-06-03 RX ORDER — ASPIRIN 325 MG
650 TABLET ORAL EVERY 8 HOURS
Status: DISCONTINUED | OUTPATIENT
Start: 2019-06-03 | End: 2019-06-04 | Stop reason: HOSPADM

## 2019-06-03 RX ADMIN — SODIUM CHLORIDE: 0.9 INJECTION, SOLUTION INTRAVENOUS at 12:06

## 2019-06-03 RX ADMIN — COLCHICINE 0.6 MG: 0.6 TABLET, FILM COATED ORAL at 09:06

## 2019-06-03 RX ADMIN — ISOSORBIDE MONONITRATE 30 MG: 30 TABLET, EXTENDED RELEASE ORAL at 09:06

## 2019-06-03 RX ADMIN — ENOXAPARIN SODIUM 40 MG: 100 INJECTION SUBCUTANEOUS at 04:06

## 2019-06-03 RX ADMIN — ISOSORBIDE MONONITRATE 30 MG: 30 TABLET, EXTENDED RELEASE ORAL at 08:06

## 2019-06-03 RX ADMIN — ASPIRIN 81 MG: 81 TABLET, COATED ORAL at 08:06

## 2019-06-03 RX ADMIN — ASPIRIN 325 MG ORAL TABLET 650 MG: 325 PILL ORAL at 09:06

## 2019-06-03 RX ADMIN — ASPIRIN 325 MG ORAL TABLET 650 MG: 325 PILL ORAL at 02:06

## 2019-06-03 RX ADMIN — SODIUM CHLORIDE: 0.9 INJECTION, SOLUTION INTRAVENOUS at 02:06

## 2019-06-03 RX ADMIN — CLOPIDOGREL BISULFATE 75 MG: 75 TABLET ORAL at 08:06

## 2019-06-03 RX ADMIN — PANTOPRAZOLE SODIUM 40 MG: 40 TABLET, DELAYED RELEASE ORAL at 08:06

## 2019-06-03 RX ADMIN — AMLODIPINE BESYLATE 5 MG: 5 TABLET ORAL at 08:06

## 2019-06-03 RX ADMIN — ATORVASTATIN CALCIUM 40 MG: 40 TABLET, FILM COATED ORAL at 09:06

## 2019-06-03 RX ADMIN — SODIUM CHLORIDE: 0.9 INJECTION, SOLUTION INTRAVENOUS at 10:06

## 2019-06-03 NOTE — SUBJECTIVE & OBJECTIVE
Review of Systems   Constitution: Negative.   HENT: Negative.    Eyes: Negative.    Cardiovascular: Negative.    Respiratory: Negative.    Endocrine: Negative.    Hematologic/Lymphatic: Negative.    Skin: Negative.    Musculoskeletal:        Left arm heaviness   Gastrointestinal: Negative.    Genitourinary: Negative.    Neurological: Negative.    Psychiatric/Behavioral: Negative.    Allergic/Immunologic: Negative.      Objective:     Vital Signs (Most Recent):  Temp: 98.5 °F (36.9 °C) (06/03/19 1153)  Pulse: 74 (06/03/19 1153)  Resp: 15 (06/03/19 1153)  BP: 120/65 (06/03/19 1153)  SpO2: 100 % (06/03/19 1153) Vital Signs (24h Range):  Temp:  [97.5 °F (36.4 °C)-98.9 °F (37.2 °C)] 98.5 °F (36.9 °C)  Pulse:  [68-77] 74  Resp:  [15-18] 15  SpO2:  [96 %-100 %] 100 %  BP: (105-122)/(53-66) 120/65     Weight: 93 kg (205 lb 0.4 oz)  Body mass index is 26.32 kg/m².     SpO2: 100 %  O2 Device (Oxygen Therapy): room air      Intake/Output Summary (Last 24 hours) at 6/3/2019 1331  Last data filed at 6/2/2019 1700  Gross per 24 hour   Intake 360 ml   Output --   Net 360 ml       Lines/Drains/Airways     Peripheral Intravenous Line                 Peripheral IV - Single Lumen 05/30/19 1600 20 G Right Hand 3 days                Physical Exam   Constitutional: He is oriented to person, place, and time. He appears well-developed and well-nourished. No distress.   HENT:   Head: Normocephalic and atraumatic.   Eyes: Pupils are equal, round, and reactive to light. Right eye exhibits no discharge. Left eye exhibits no discharge.   Neck: Neck supple. No JVD present.   Cardiovascular: Normal rate, regular rhythm, S1 normal, S2 normal and normal heart sounds.   No murmur heard.  Pulmonary/Chest: Effort normal and breath sounds normal. No respiratory distress. He has no wheezes. He has no rales.   Abdominal: Soft. He exhibits no distension.   Musculoskeletal: He exhibits no edema.   Neurological: He is alert and oriented to person,  place, and time.   Skin: Skin is warm and dry. He is not diaphoretic. No erythema.   Psychiatric: He has a normal mood and affect. His behavior is normal. Thought content normal.   Nursing note and vitals reviewed.      Significant Labs:   CMP   Recent Labs   Lab 06/02/19  0403 06/03/19  0532    138   K 4.0 3.8    104   CO2 30* 27   GLU 82 95   BUN 11 16   CREATININE 1.3 1.5*   CALCIUM 10.0 9.8   ANIONGAP 7* 7*   ESTGFRAFRICA >60 >60   EGFRNONAA >60 59*   , CBC   Recent Labs   Lab 06/02/19  0403 06/03/19  0532   WBC 4.47 3.76*   HGB 13.8* 13.4*   HCT 41.7 40.8    197   , Troponin   Recent Labs   Lab 06/02/19  1259   TROPONINI 1.815*    and All pertinent lab results from the last 24 hours have been reviewed.    Significant Imaging: Echocardiogram:   2D echo with color flow doppler:   Results for orders placed or performed during the hospital encounter of 05/30/19   2D echo with color flow doppler   Result Value Ref Range    QEF 55 55 - 65    Diastolic Dysfunction No     Est. PA Systolic Pressure 15.25    , EKG: Reviewed and X-Ray: CXR: X-Ray Chest 1 View (CXR): No results found for this visit on 05/30/19. and X-Ray Chest PA and Lateral (CXR): No results found for this visit on 05/30/19.

## 2019-06-03 NOTE — ASSESSMENT & PLAN NOTE
-Cardiology  On board  -Chest CTA did not show any acute abnormalities   -Per cardiology no LH at this time   - The TTE did not show any  acute finding   -s/p heparin  Drip   -Cont asa ,, imdur and ccb   -BB and plavix was d/c per cardiology

## 2019-06-03 NOTE — SUBJECTIVE & OBJECTIVE
Interval History:     Review of Systems   Constitutional: Negative for chills, diaphoresis, fatigue and fever.   HENT: Negative for congestion, sore throat and voice change.    Eyes: Negative for photophobia and visual disturbance.   Respiratory: Negative for cough, shortness of breath, wheezing and stridor.    Cardiovascular: Negative for chest pain and leg swelling.   Gastrointestinal: Negative for abdominal distention, abdominal pain, constipation, diarrhea, nausea and vomiting.   Endocrine: Negative for polydipsia, polyphagia and polyuria.   Genitourinary: Negative for difficulty urinating, dysuria, flank pain, testicular pain and urgency.   Musculoskeletal: Negative for back pain, joint swelling, neck pain and neck stiffness.   Skin: Negative for color change and rash.   Allergic/Immunologic: Negative for immunocompromised state.   Neurological: Negative for dizziness, syncope, weakness, numbness and headaches.   Hematological: Does not bruise/bleed easily.   Psychiatric/Behavioral: Negative for agitation, behavioral problems and confusion.     Objective:     Vital Signs (Most Recent):  Temp: 98.5 °F (36.9 °C) (06/03/19 1153)  Pulse: 74 (06/03/19 1153)  Resp: 15 (06/03/19 1153)  BP: 120/65 (06/03/19 1153)  SpO2: 100 % (06/03/19 1153) Vital Signs (24h Range):  Temp:  [97.5 °F (36.4 °C)-98.9 °F (37.2 °C)] 98.5 °F (36.9 °C)  Pulse:  [68-77] 74  Resp:  [15-18] 15  SpO2:  [96 %-100 %] 100 %  BP: (105-122)/(53-66) 120/65     Weight: 93 kg (205 lb 0.4 oz)  Body mass index is 26.32 kg/m².    Intake/Output Summary (Last 24 hours) at 6/3/2019 1445  Last data filed at 6/2/2019 1700  Gross per 24 hour   Intake 360 ml   Output --   Net 360 ml      Physical Exam   Constitutional: He is oriented to person, place, and time. He appears well-developed and well-nourished. No distress.   HENT:   Head: Normocephalic and atraumatic.   Nose: Nose normal.   Eyes: Pupils are equal, round, and reactive to light. Conjunctivae and EOM are  normal. No scleral icterus.   Neck: Normal range of motion. Neck supple. No tracheal deviation present.   Cardiovascular: Normal rate, regular rhythm, normal heart sounds and intact distal pulses.   No murmur heard.  Pulmonary/Chest: Effort normal and breath sounds normal. No stridor. No respiratory distress. He has no wheezes. He has no rales.   Abdominal: Soft. Bowel sounds are normal. He exhibits no distension. There is no tenderness. There is no guarding.   Musculoskeletal: Normal range of motion. He exhibits no edema or deformity.   Neurological: He is alert and oriented to person, place, and time. No cranial nerve deficit.   Skin: Skin is warm and dry. Capillary refill takes less than 2 seconds. No rash noted. He is not diaphoretic.   Psychiatric: He has a normal mood and affect. His behavior is normal. Judgment and thought content normal.   Nursing note and vitals reviewed.      Significant Labs: All pertinent labs within the past 24 hours have been reviewed.    Significant Imaging: I have reviewed all pertinent imaging results/findings within the past 24 hours.

## 2019-06-03 NOTE — ASSESSMENT & PLAN NOTE
-Patient who presents with recurrent/NSTEMI  -? Coronary vasospasm vs pericarditis  -Continue ASA, Plavix, amlodipine, Imdur, statin  -Reviewed cath images in detail-normal coronaries; no plans for repeat angiogram per Dr. Bojorquez  -Will treat for pericarditis as well and assess response  -Increase ASA to 650 mg TID  -Start colchicine 0.6 mg BID  -Monitor overnight, repeat labs to assess kidney function

## 2019-06-03 NOTE — ASSESSMENT & PLAN NOTE
-Pericarditis vs coronary vasospasm  -See plan under NSTEMI  -Start Prilosec as well to rule out any GERD related component

## 2019-06-03 NOTE — ASSESSMENT & PLAN NOTE
-Mild elevation of Kidney function : Multifactorial  Due to  Recent  contrast   -Increase IVF   -Monitor kidney function and UOP

## 2019-06-03 NOTE — PROGRESS NOTES
[facility]  Cardiology  Progress Note    Patient Name: Oumar Mccarthy  MRN: [id number]  Admission Date: 5/30/2019  Hospital Length of Stay: 4 days  Code Status: Full Code   Attending Physician: Rodney Quezada, *   Primary Care Physician: Leana Troy MD  Expected Discharge Date: 6/3/2019  Principal Problem:NSTEMI (non-ST elevated myocardial infarction)    Subjective:   HPI:  Pt presents with chest pain.  Pt admitted one week ago for NSTEMI and had LHC done by Dr. Rich 7 days ago and was angiographically normal, normal LVEF.  He has been having chest pain sxs over last 1.5 weeks, intermittently, and yesterday got worse again so went to [facility] ER.  ecg over last week shows NSR, inferior st-t abnl.    Consideration given to vasospasm and demand ischemia last week after cath was normal.  Troponin elevated in ER, now trending down.  Still with intermittent cp.  No acute dyspnea.    Nonsmoker.  No drug use.    Hospital Course:   6/1/19:  PT FEELS REMARKABLY IMPROVED TODAY.  DENIES CHEST PAIN SXS. NO CHF SXS.  HAS BEEN STARTED ON NORVASC AND NITRATES THIS ADMISSION.  CTA CHEST NO PE, NORMAL AORTA.  F/U ECHO SHOWED NORMAL LVEF, NO WMA, NORMAL VALVES.     6/2/19:  PT HAD EPISODES OF SHARP CP LAST NIGHT THEN RESOLVED.  HE FELL ASLEEP PER PT AND HAS NOT HAD RECURRENT CP TODAY.  NO DYSPNEA. HE HAS NO COMPLAINTS AT TIME OF VISIT THIS AFTERNOON.  TROPONIN RECHECKED AND CONTINUES TO TREND DOWN AND ECG TODAY SHOWS NSR, INFERIOR ST-T ABNORMALITY NOTED ON ALL ECGS OVER LAST 10 DAYS.      6/3/19-Patient seen and examined today. Uneventful night. No recurrence of chest pain. Still complains of some left upper extremity weakness/numbness (was ongoing prior to cath and initial admission). No SOB. Tolerating meds. Labs reviewed, creatinine 1.5.        Review of Systems   Constitution: Negative.   HENT: Negative.    Eyes: Negative.    Cardiovascular: Negative.    Respiratory: Negative.    Endocrine:  Negative.    Hematologic/Lymphatic: Negative.    Skin: Negative.    Musculoskeletal:        Left arm heaviness   Gastrointestinal: Negative.    Genitourinary: Negative.    Neurological: Negative.    Psychiatric/Behavioral: Negative.    Allergic/Immunologic: Negative.      Objective:     Vital Signs (Most Recent):  Temp: 98.5 °F (36.9 °C) (06/03/19 1153)  Pulse: 74 (06/03/19 1153)  Resp: 15 (06/03/19 1153)  BP: 120/65 (06/03/19 1153)  SpO2: 100 % (06/03/19 1153) Vital Signs (24h Range):  Temp:  [97.5 °F (36.4 °C)-98.9 °F (37.2 °C)] 98.5 °F (36.9 °C)  Pulse:  [68-77] 74  Resp:  [15-18] 15  SpO2:  [96 %-100 %] 100 %  BP: (105-122)/(53-66) 120/65     Weight: 93 kg (205 lb 0.4 oz)  Body mass index is 26.32 kg/m².     SpO2: 100 %  O2 Device (Oxygen Therapy): room air      Intake/Output Summary (Last 24 hours) at 6/3/2019 1331  Last data filed at 6/2/2019 1700  Gross per 24 hour   Intake 360 ml   Output --   Net 360 ml       Lines/Drains/Airways     Peripheral Intravenous Line                 Peripheral IV - Single Lumen 05/30/19 1600 20 G Right Hand 3 days                Physical Exam   Constitutional: He is oriented to person, place, and time. He appears well-developed and well-nourished. No distress.   HENT:   Head: Normocephalic and atraumatic.   Eyes: Pupils are equal, round, and reactive to light. Right eye exhibits no discharge. Left eye exhibits no discharge.   Neck: Neck supple. No JVD present.   Cardiovascular: Normal rate, regular rhythm, S1 normal, S2 normal and normal heart sounds.   No murmur heard.  Pulmonary/Chest: Effort normal and breath sounds normal. No respiratory distress. He has no wheezes. He has no rales.   Abdominal: Soft. He exhibits no distension.   Musculoskeletal: He exhibits no edema.   Neurological: He is alert and oriented to person, place, and time.   Skin: Skin is warm and dry. He is not diaphoretic. No erythema.   Psychiatric: He has a normal mood and affect. His behavior is normal.  Thought content normal.   Nursing note and vitals reviewed.      Significant Labs:   CMP   Recent Labs   Lab 06/02/19  0403 06/03/19  0532    138   K 4.0 3.8    104   CO2 30* 27   GLU 82 95   BUN 11 16   CREATININE 1.3 1.5*   CALCIUM 10.0 9.8   ANIONGAP 7* 7*   ESTGFRAFRICA >60 >60   EGFRNONAA >60 59*   , CBC   Recent Labs   Lab 06/02/19  0403 06/03/19  0532   WBC 4.47 3.76*   HGB 13.8* 13.4*   HCT 41.7 40.8    197   , Troponin   Recent Labs   Lab 06/02/19  1259   TROPONINI 1.815*    and All pertinent lab results from the last 24 hours have been reviewed.    Significant Imaging: Echocardiogram:   2D echo with color flow doppler:   Results for orders placed or performed during the hospital encounter of 05/30/19   2D echo with color flow doppler   Result Value Ref Range    QEF 55 55 - 65    Diastolic Dysfunction No     Est. PA Systolic Pressure 15.25    , EKG: Reviewed and X-Ray: CXR: X-Ray Chest 1 View (CXR): No results found for this visit on 05/30/19. and X-Ray Chest PA and Lateral (CXR): No results found for this visit on 05/30/19.    Assessment and Plan:   Patient who presents with recurrent NSTEMI. Stable overnight, no recurrence of chest pain.  Coronary vasospasm vs pericarditis. Continue same meds. Increase ASA and start Colchicine and assess response. Repeat labs in AM.    * NSTEMI (non-ST elevated myocardial infarction)  -Patient who presents with recurrent NSTEMI  -? Coronary vasospasm vs pericarditis  -Continue ASA, Plavix, amlodipine, Imdur, statin  -Reviewed cath images in detail-normal coronaries; no plans for repeat angiogram per Dr. Bojorquez  -Will treat for pericarditis and assess response  -Increase ASA to 650 mg TID   -Start colchicine 0.6 mg BID  -Monitor overnight, repeat labs to assess kidney function    Coronary vasospasm  -Tolerating meds  -Continue amlodipine, Imdur  -Chest pain free overnight    Abnormal ECG  -EKG with chronic abnormalities in inferior leads   -No acute  changes    Hypertension  -Continue amlodipine, Imdur     Chest pain  -Pericarditis vs coronary vasospasm  -See plan under NSTEMI  -Continue PPI in case there is any GERD related component    Hyperlipidemia  -Continue statin        VTE Risk Mitigation (From admission, onward)        Ordered     enoxaparin injection 40 mg  Daily      06/02/19 1342     IP VTE HIGH RISK PATIENT  Once      05/30/19 2144     Place SARY hose  Until discontinued      05/30/19 2144     Place sequential compression device  Until discontinued      05/30/19 2144     Reason for No Pharmacological VTE Prophylaxis  Once      05/30/19 2144          Angeline Orourke PA-C  Cardiology  [facility]     Chart reviewed. Dr. Bojorquez examined patient and agrees with plan as outlined above.

## 2019-06-03 NOTE — PLAN OF CARE
Problem: Adult Inpatient Plan of Care  Goal: Plan of Care Review  Outcome: Ongoing (interventions implemented as appropriate)  POC reviewed, verbalized understanding. Pt remained free from falls, fall precautions in place. Pt is SR on monitor, 60-70s. VSS. No CP or distress at this time. Pt NPO since MN. PIV intact. Call bell and personal belongings within reach. Hourly rounding complete. Reminded to call for assistance. Will continue to monitor.

## 2019-06-03 NOTE — PLAN OF CARE
Problem: Adult Inpatient Plan of Care  Goal: Plan of Care Review  Outcome: Ongoing (interventions implemented as appropriate)  Plan of care reviewed with patient,verbalized understanding  NSR on cardiac monitor  NS@125  Cardiac diet  Fall precautions, instructed to call for assistance  Call light in reach, non slip socks on  Patient has no concerns at this time  Will continue to monitor.

## 2019-06-03 NOTE — PROGRESS NOTES
[facility] Hospital Medicine  Progress Note    Patient Name: Oumar Mccarthy  MRN: [id number]  Patient Class: IP- Inpatient   Admission Date: 5/30/2019  Length of Stay: 4 days  Attending Physician: Rodney Quezada, *  Primary Care Provider: Leana Troy MD        Subjective:     Principal Problem:NSTEMI (non-ST elevated myocardial infarction)    HPI:  33 year old male presents as a trasnfer from outside freestanding ER for complaint of Chest Pain. Patient evaluated in the Er. Troponin 6.819. Started on Heparin drip. HM consulted, patient accepted and transferred for admission. Patient seen and examined. Patient reports pain intermittent to left side with radiation to left arm over the past week. Patient reports at this time currently pain free. No modifying factors. No associated symptoms. Prior treatment: ASA, also patient seen/admitted for similar complaint with having a normal heart cath 5/24/19. See chart review for more details.     Hospital Course:  5/31 Pt was seen and examined at bedside . He cont with mild chest pain .Cardiology was consulted and recommend to cont Heparin drip . The  Chest CTA did not show any acute finding / The TTE did not show any acute finding .   6/1 Pt was seen and examined at bedside . He  Denies any complaint for today . Cardiology recommend to cont heparin drip  dffor today .  Cardiology add Imdur and Norvasc . The troponin level are trending down . There no indication for a LHC at this time  Per cardiology . The Chest CTA did nto show any acute finding .   6/2 Pt was seen and examined at bedside . He had a chest pain episode  Last night . The troponin level is trending down . The heparin drip was d/c .  The Imdur was changed to 2 time a day .    6/3 Pt was seen and examined at bedside . He did no have any chest pain  Overnight . The kidney function trend up today . Cardiology now think is possible pericarditis . Pt was started on  Asa and colchicine  per cardiology .     Interval History:     Review of Systems   Constitutional: Negative for chills, diaphoresis, fatigue and fever.   HENT: Negative for congestion, sore throat and voice change.    Eyes: Negative for photophobia and visual disturbance.   Respiratory: Negative for cough, shortness of breath, wheezing and stridor.    Cardiovascular: Negative for chest pain and leg swelling.   Gastrointestinal: Negative for abdominal distention, abdominal pain, constipation, diarrhea, nausea and vomiting.   Endocrine: Negative for polydipsia, polyphagia and polyuria.   Genitourinary: Negative for difficulty urinating, dysuria, flank pain, testicular pain and urgency.   Musculoskeletal: Negative for back pain, joint swelling, neck pain and neck stiffness.   Skin: Negative for color change and rash.   Allergic/Immunologic: Negative for immunocompromised state.   Neurological: Negative for dizziness, syncope, weakness, numbness and headaches.   Hematological: Does not bruise/bleed easily.   Psychiatric/Behavioral: Negative for agitation, behavioral problems and confusion.     Objective:     Vital Signs (Most Recent):  Temp: 98.5 °F (36.9 °C) (06/03/19 1153)  Pulse: 74 (06/03/19 1153)  Resp: 15 (06/03/19 1153)  BP: 120/65 (06/03/19 1153)  SpO2: 100 % (06/03/19 1153) Vital Signs (24h Range):  Temp:  [97.5 °F (36.4 °C)-98.9 °F (37.2 °C)] 98.5 °F (36.9 °C)  Pulse:  [68-77] 74  Resp:  [15-18] 15  SpO2:  [96 %-100 %] 100 %  BP: (105-122)/(53-66) 120/65     Weight: 93 kg (205 lb 0.4 oz)  Body mass index is 26.32 kg/m².    Intake/Output Summary (Last 24 hours) at 6/3/2019 1445  Last data filed at 6/2/2019 1700  Gross per 24 hour   Intake 360 ml   Output --   Net 360 ml      Physical Exam   Constitutional: He is oriented to person, place, and time. He appears well-developed and well-nourished. No distress.   HENT:   Head: Normocephalic and atraumatic.   Nose: Nose normal.   Eyes: Pupils are equal, round, and reactive to light.  Conjunctivae and EOM are normal. No scleral icterus.   Neck: Normal range of motion. Neck supple. No tracheal deviation present.   Cardiovascular: Normal rate, regular rhythm, normal heart sounds and intact distal pulses.   No murmur heard.  Pulmonary/Chest: Effort normal and breath sounds normal. No stridor. No respiratory distress. He has no wheezes. He has no rales.   Abdominal: Soft. Bowel sounds are normal. He exhibits no distension. There is no tenderness. There is no guarding.   Musculoskeletal: Normal range of motion. He exhibits no edema or deformity.   Neurological: He is alert and oriented to person, place, and time. No cranial nerve deficit.   Skin: Skin is warm and dry. Capillary refill takes less than 2 seconds. No rash noted. He is not diaphoretic.   Psychiatric: He has a normal mood and affect. His behavior is normal. Judgment and thought content normal.   Nursing note and vitals reviewed.      Significant Labs: All pertinent labs within the past 24 hours have been reviewed.    Significant Imaging: I have reviewed all pertinent imaging results/findings within the past 24 hours.    Assessment/Plan:      * NSTEMI (non-ST elevated myocardial infarction)  -Cardiology  On board  -Chest CTA did not show any acute abnormalities   -Per cardiology no [facility] at this time   - The TTE did not show any  acute finding   -s/p heparin  Drip   -Cont asa ,, imdur and ccb   -BB and plavix was d/c per cardiology       HAJA (acute kidney injury)  -Mild elevation of Kidney function : Multifactorial  Due to  Recent  contrast   -Increase IVF   -Monitor kidney function and UOP         Coronary vasospasm  started on Imdur and Norvasc  Cont asa and plavix        Abnormal ECG        Hypertension  Continue home meds  Monitor tends          Chest pain  NSTEMI    Possible endocarditis   Cardiology start High dose of asa and colchicine         Hyperlipidemia  Statin          VTE Risk Mitigation (From admission, onward)        Ordered      enoxaparin injection 40 mg  Daily      06/02/19 1342     IP VTE HIGH RISK PATIENT  Once      05/30/19 2144     Place SARY hose  Until discontinued      05/30/19 2144     Place sequential compression device  Until discontinued      05/30/19 2144     Reason for No Pharmacological VTE Prophylaxis  Once      05/30/19 2144              Rodney Quezada MD  Department of Hospital Medicine   [facility]

## 2019-06-04 ENCOUNTER — TELEPHONE (OUTPATIENT)
Dept: CARDIOLOGY | Facility: CLINIC | Age: 34
End: 2019-06-04

## 2019-06-04 VITALS
SYSTOLIC BLOOD PRESSURE: 128 MMHG | HEIGHT: 74 IN | DIASTOLIC BLOOD PRESSURE: 63 MMHG | TEMPERATURE: 98 F | BODY MASS INDEX: 28.64 KG/M2 | OXYGEN SATURATION: 100 % | HEART RATE: 73 BPM | RESPIRATION RATE: 16 BRPM | WEIGHT: 223.13 LBS

## 2019-06-04 PROBLEM — R07.9 CHEST PAIN: Status: RESOLVED | Noted: 2019-05-30 | Resolved: 2019-06-04

## 2019-06-04 PROBLEM — N17.9 AKI (ACUTE KIDNEY INJURY): Status: RESOLVED | Noted: 2019-06-03 | Resolved: 2019-06-04

## 2019-06-04 LAB
ANION GAP SERPL CALC-SCNC: 6 MMOL/L (ref 8–16)
BASOPHILS # BLD AUTO: 0.01 K/UL (ref 0–0.2)
BASOPHILS NFR BLD: 0.3 % (ref 0–1.9)
BUN SERPL-MCNC: 16 MG/DL (ref 6–20)
CALCIUM SERPL-MCNC: 9.3 MG/DL (ref 8.7–10.5)
CHLORIDE SERPL-SCNC: 107 MMOL/L (ref 95–110)
CO2 SERPL-SCNC: 26 MMOL/L (ref 23–29)
CREAT SERPL-MCNC: 1.1 MG/DL (ref 0.5–1.4)
DIFFERENTIAL METHOD: ABNORMAL
EOSINOPHIL # BLD AUTO: 0.1 K/UL (ref 0–0.5)
EOSINOPHIL NFR BLD: 3.4 % (ref 0–8)
ERYTHROCYTE [DISTWIDTH] IN BLOOD BY AUTOMATED COUNT: 13.3 % (ref 11.5–14.5)
EST. GFR  (AFRICAN AMERICAN): >60 ML/MIN/1.73 M^2
EST. GFR  (NON AFRICAN AMERICAN): >60 ML/MIN/1.73 M^2
GLUCOSE SERPL-MCNC: 91 MG/DL (ref 70–110)
HCT VFR BLD AUTO: 38.4 % (ref 40–54)
HGB BLD-MCNC: 12.7 G/DL (ref 14–18)
LYMPHOCYTES # BLD AUTO: 1.2 K/UL (ref 1–4.8)
LYMPHOCYTES NFR BLD: 33.3 % (ref 18–48)
MCH RBC QN AUTO: 28.4 PG (ref 27–31)
MCHC RBC AUTO-ENTMCNC: 33.1 G/DL (ref 32–36)
MCV RBC AUTO: 86 FL (ref 82–98)
MONOCYTES # BLD AUTO: 0.4 K/UL (ref 0.3–1)
MONOCYTES NFR BLD: 10.1 % (ref 4–15)
NEUTROPHILS # BLD AUTO: 1.9 K/UL (ref 1.8–7.7)
NEUTROPHILS NFR BLD: 52.9 % (ref 38–73)
PLATELET # BLD AUTO: 179 K/UL (ref 150–350)
PMV BLD AUTO: 9.7 FL (ref 9.2–12.9)
POTASSIUM SERPL-SCNC: 4 MMOL/L (ref 3.5–5.1)
RBC # BLD AUTO: 4.47 M/UL (ref 4.6–6.2)
SODIUM SERPL-SCNC: 139 MMOL/L (ref 136–145)
TROPONIN I SERPL DL<=0.01 NG/ML-MCNC: 1.67 NG/ML (ref 0–0.03)
WBC # BLD AUTO: 3.57 K/UL (ref 3.9–12.7)

## 2019-06-04 PROCEDURE — 25000003 PHARM REV CODE 250: Performed by: NURSE PRACTITIONER

## 2019-06-04 PROCEDURE — 85025 COMPLETE CBC W/AUTO DIFF WBC: CPT

## 2019-06-04 PROCEDURE — 99231 SBSQ HOSP IP/OBS SF/LOW 25: CPT | Mod: ,,, | Performed by: INTERNAL MEDICINE

## 2019-06-04 PROCEDURE — 25000003 PHARM REV CODE 250: Performed by: INTERNAL MEDICINE

## 2019-06-04 PROCEDURE — 84484 ASSAY OF TROPONIN QUANT: CPT

## 2019-06-04 PROCEDURE — 25000003 PHARM REV CODE 250: Performed by: PHYSICIAN ASSISTANT

## 2019-06-04 PROCEDURE — 93041 RHYTHM ECG TRACING: CPT

## 2019-06-04 PROCEDURE — 80048 BASIC METABOLIC PNL TOTAL CA: CPT

## 2019-06-04 PROCEDURE — 93010 EKG 12-LEAD: ICD-10-PCS | Mod: ,,, | Performed by: INTERNAL MEDICINE

## 2019-06-04 PROCEDURE — 99231 PR SUBSEQUENT HOSPITAL CARE,LEVL I: ICD-10-PCS | Mod: ,,, | Performed by: INTERNAL MEDICINE

## 2019-06-04 PROCEDURE — 36415 COLL VENOUS BLD VENIPUNCTURE: CPT

## 2019-06-04 PROCEDURE — 93010 ELECTROCARDIOGRAM REPORT: CPT | Mod: ,,, | Performed by: INTERNAL MEDICINE

## 2019-06-04 PROCEDURE — 93005 ELECTROCARDIOGRAM TRACING: CPT

## 2019-06-04 PROCEDURE — 94761 N-INVAS EAR/PLS OXIMETRY MLT: CPT

## 2019-06-04 RX ORDER — AMLODIPINE BESYLATE 5 MG/1
5 TABLET ORAL DAILY
Qty: 30 TABLET | Refills: 1 | Status: SHIPPED | OUTPATIENT
Start: 2019-06-05 | End: 2019-10-18

## 2019-06-04 RX ORDER — ISOSORBIDE MONONITRATE 30 MG/1
30 TABLET, EXTENDED RELEASE ORAL 2 TIMES DAILY
Qty: 60 TABLET | Refills: 1 | Status: SHIPPED | OUTPATIENT
Start: 2019-06-04 | End: 2019-06-25 | Stop reason: ALTCHOICE

## 2019-06-04 RX ORDER — COLCHICINE 0.6 MG/1
0.6 TABLET ORAL DAILY
Qty: 30 TABLET | Refills: 0 | Status: SHIPPED | OUTPATIENT
Start: 2019-06-04 | End: 2019-06-25 | Stop reason: SDUPTHER

## 2019-06-04 RX ORDER — ASPIRIN 650 MG
650 TABLET, DELAYED RELEASE (ENTERIC COATED) ORAL 2 TIMES DAILY
Refills: 0 | COMMUNITY
Start: 2019-06-04 | End: 2019-06-11

## 2019-06-04 RX ORDER — PANTOPRAZOLE SODIUM 40 MG/1
40 TABLET, DELAYED RELEASE ORAL DAILY
Qty: 30 TABLET | Refills: 1 | Status: SHIPPED | OUTPATIENT
Start: 2019-06-05 | End: 2019-06-25 | Stop reason: ALTCHOICE

## 2019-06-04 RX ADMIN — COLCHICINE 0.6 MG: 0.6 TABLET, FILM COATED ORAL at 08:06

## 2019-06-04 RX ADMIN — AMLODIPINE BESYLATE 5 MG: 5 TABLET ORAL at 08:06

## 2019-06-04 RX ADMIN — PANTOPRAZOLE SODIUM 40 MG: 40 TABLET, DELAYED RELEASE ORAL at 08:06

## 2019-06-04 RX ADMIN — ISOSORBIDE MONONITRATE 30 MG: 30 TABLET, EXTENDED RELEASE ORAL at 08:06

## 2019-06-04 RX ADMIN — ASPIRIN 325 MG ORAL TABLET 650 MG: 325 PILL ORAL at 02:06

## 2019-06-04 RX ADMIN — ASPIRIN 325 MG ORAL TABLET 650 MG: 325 PILL ORAL at 05:06

## 2019-06-04 RX ADMIN — SODIUM CHLORIDE: 0.9 INJECTION, SOLUTION INTRAVENOUS at 06:06

## 2019-06-04 NOTE — PLAN OF CARE
Problem: Adult Inpatient Plan of Care  Goal: Plan of Care Review  Outcome: Outcome(s) achieved Date Met: 06/04/19  Plan of care reviewed with patient,verbalized understanding  NSR on cardiac monitor  NS@125  Cardiac diet  Fall precautions, instructed to call for assistance  Call light in reach, non slip socks on  Patient has no concerns at this time  Will continue to monitor.

## 2019-06-04 NOTE — SUBJECTIVE & OBJECTIVE
Review of Systems   Constitution: Negative.   HENT: Negative.    Eyes: Negative.    Cardiovascular:        Mild pleuritic type chest discomfort   Respiratory: Negative.    Endocrine: Negative.    Hematologic/Lymphatic: Negative.    Skin: Negative.    Musculoskeletal: Negative.    Gastrointestinal: Negative.    Genitourinary: Negative.    Neurological: Negative.    Psychiatric/Behavioral: Negative.    Allergic/Immunologic: Negative.      Objective:     Vital Signs (Most Recent):  Temp: 97.8 °F (36.6 °C) (06/04/19 1126)  Pulse: 68 (06/04/19 1126)  Resp: 16 (06/04/19 1126)  BP: 128/63 (06/04/19 1126)  SpO2: 100 % (06/04/19 1126) Vital Signs (24h Range):  Temp:  [97.7 °F (36.5 °C)-98.6 °F (37 °C)] 97.8 °F (36.6 °C)  Pulse:  [68-95] 68  Resp:  [15-18] 16  SpO2:  [96 %-100 %] 100 %  BP: ()/(51-71) 128/63     Weight: 101.2 kg (223 lb 1.7 oz)  Body mass index is 28.65 kg/m².     SpO2: 100 %  O2 Device (Oxygen Therapy): room air      Intake/Output Summary (Last 24 hours) at 6/4/2019 1417  Last data filed at 6/4/2019 1157  Gross per 24 hour   Intake 1357.92 ml   Output --   Net 1357.92 ml       Lines/Drains/Airways     Peripheral Intravenous Line                 Peripheral IV - Single Lumen 05/30/19 1600 20 G Right Hand 4 days                Physical Exam   Constitutional: He is oriented to person, place, and time. He appears well-developed and well-nourished. No distress.   HENT:   Head: Normocephalic and atraumatic.   Eyes: Pupils are equal, round, and reactive to light. Right eye exhibits no discharge. Left eye exhibits no discharge.   Neck: Neck supple. No JVD present.   Cardiovascular: Normal rate, regular rhythm, S1 normal, S2 normal and normal heart sounds. PMI is not displaced.   No murmur heard.  Pulmonary/Chest: Effort normal and breath sounds normal. No respiratory distress. He has no wheezes. He has no rales.   Abdominal: Soft. He exhibits no distension.   Musculoskeletal: He exhibits no edema.    Neurological: He is alert and oriented to person, place, and time.   Skin: Skin is warm and dry. He is not diaphoretic. No erythema.   Psychiatric: He has a normal mood and affect. His behavior is normal. Thought content normal.   Nursing note and vitals reviewed.      Significant Labs:   CMP   Recent Labs   Lab 06/03/19  0532 06/04/19  0559    139   K 3.8 4.0    107   CO2 27 26   GLU 95 91   BUN 16 16   CREATININE 1.5* 1.1   CALCIUM 9.8 9.3   ANIONGAP 7* 6*   ESTGFRAFRICA >60 >60   EGFRNONAA 59* >60   , CBC   Recent Labs   Lab 06/03/19  0532 06/04/19  0600   WBC 3.76* 3.57*   HGB 13.4* 12.7*   HCT 40.8 38.4*    179   , Troponin   Recent Labs   Lab 06/04/19  1224   TROPONINI 1.669*    and All pertinent lab results from the last 24 hours have been reviewed.    Significant Imaging: Echocardiogram:   2D echo with color flow doppler:   Results for orders placed or performed during the hospital encounter of 05/30/19   2D echo with color flow doppler   Result Value Ref Range    QEF 55 55 - 65    Diastolic Dysfunction No     Est. PA Systolic Pressure 15.25    , EKG: Reviewed and X-Ray: CXR: X-Ray Chest 1 View (CXR): No results found for this visit on 05/30/19. and X-Ray Chest PA and Lateral (CXR): No results found for this visit on 05/30/19.

## 2019-06-04 NOTE — PROGRESS NOTES
[facility]  Cardiology  Progress Note    Patient Name: Oumar Mccarthy  MRN: [id number]  Admission Date: 5/30/2019  Hospital Length of Stay: 5 days  Code Status: Full Code   Attending Physician: Rodney Quezada, *   Primary Care Physician: Leana Troy MD  Expected Discharge Date: 6/4/2019  Principal Problem:NSTEMI (non-ST elevated myocardial infarction)    Subjective:   HPI:  Pt presents with chest pain.  Pt admitted one week ago for NSTEMI and had LHC done by Dr. Rich 7 days ago and was angiographically normal, normal LVEF.  He has been having chest pain sxs over last 1.5 weeks, intermittently, and yesterday got worse again so went to [facility] ER.  ecg over last week shows NSR, inferior st-t abnl.    Consideration given to vasospasm and demand ischemia last week after cath was normal.  Troponin elevated in ER, now trending down.  Still with intermittent cp.  No acute dyspnea.    Nonsmoker.  No drug use.    Hospital Course:   6/1/19:  PT FEELS REMARKABLY IMPROVED TODAY.  DENIES CHEST PAIN SXS. NO CHF SXS.  HAS BEEN STARTED ON NORVASC AND NITRATES THIS ADMISSION.  CTA CHEST NO PE, NORMAL AORTA.  F/U ECHO SHOWED NORMAL LVEF, NO WMA, NORMAL VALVES.     6/2/19:  PT HAD EPISODES OF SHARP CP LAST NIGHT THEN RESOLVED.  HE FELL ASLEEP PER PT AND HAS NOT HAD RECURRENT CP TODAY.  NO DYSPNEA. HE HAS NO COMPLAINTS AT TIME OF VISIT THIS AFTERNOON.  TROPONIN RECHECKED AND CONTINUES TO TREND DOWN AND ECG TODAY SHOWS NSR, INFERIOR ST-T ABNORMALITY NOTED ON ALL ECGS OVER LAST 10 DAYS.      6/3/19-Patient seen and examined today. Uneventful night. No recurrence of chest pain. Still complains of some left upper extremity weakness/heaviness (was ongoing prior to cath and initial admission). No SOB. Tolerating meds. Labs reviewed, creatinine 1.5.    6/4/19-Patient seen and examined today, resting in bed. Feels well. Still has occasional pleuritic type chest discomfort, much improved from admission. Left  arm heaviness/weakness also improved. No SOB. Labs reviewed. Troponin trending down. Creatinine has normalized. EKG without acute ischemic changes.       Review of Systems   Constitution: Negative.   HENT: Negative.    Eyes: Negative.    Cardiovascular:        Mild pleuritic type chest discomfort   Respiratory: Negative.    Endocrine: Negative.    Hematologic/Lymphatic: Negative.    Skin: Negative.    Musculoskeletal: Negative.    Gastrointestinal: Negative.    Genitourinary: Negative.    Neurological: Negative.    Psychiatric/Behavioral: Negative.    Allergic/Immunologic: Negative.      Objective:     Vital Signs (Most Recent):  Temp: 97.8 °F (36.6 °C) (06/04/19 1126)  Pulse: 68 (06/04/19 1126)  Resp: 16 (06/04/19 1126)  BP: 128/63 (06/04/19 1126)  SpO2: 100 % (06/04/19 1126) Vital Signs (24h Range):  Temp:  [97.7 °F (36.5 °C)-98.6 °F (37 °C)] 97.8 °F (36.6 °C)  Pulse:  [68-95] 68  Resp:  [15-18] 16  SpO2:  [96 %-100 %] 100 %  BP: ()/(51-71) 128/63     Weight: 101.2 kg (223 lb 1.7 oz)  Body mass index is 28.65 kg/m².     SpO2: 100 %  O2 Device (Oxygen Therapy): room air      Intake/Output Summary (Last 24 hours) at 6/4/2019 1417  Last data filed at 6/4/2019 1157  Gross per 24 hour   Intake 1357.92 ml   Output --   Net 1357.92 ml       Lines/Drains/Airways     Peripheral Intravenous Line                 Peripheral IV - Single Lumen 05/30/19 1600 20 G Right Hand 4 days                Physical Exam   Constitutional: He is oriented to person, place, and time. He appears well-developed and well-nourished. No distress.   HENT:   Head: Normocephalic and atraumatic.   Eyes: Pupils are equal, round, and reactive to light. Right eye exhibits no discharge. Left eye exhibits no discharge.   Neck: Neck supple. No JVD present.   Cardiovascular: Normal rate, regular rhythm, S1 normal, S2 normal and normal heart sounds. PMI is not displaced.   No murmur heard.  Pulmonary/Chest: Effort normal and breath sounds normal. No  respiratory distress. He has no wheezes. He has no rales.   Abdominal: Soft. He exhibits no distension.   Musculoskeletal: He exhibits no edema.   Neurological: He is alert and oriented to person, place, and time.   Skin: Skin is warm and dry. He is not diaphoretic. No erythema.   Psychiatric: He has a normal mood and affect. His behavior is normal. Thought content normal.   Nursing note and vitals reviewed.      Significant Labs:   CMP   Recent Labs   Lab 06/03/19  0532 06/04/19  0559    139   K 3.8 4.0    107   CO2 27 26   GLU 95 91   BUN 16 16   CREATININE 1.5* 1.1   CALCIUM 9.8 9.3   ANIONGAP 7* 6*   ESTGFRAFRICA >60 >60   EGFRNONAA 59* >60   , CBC   Recent Labs   Lab 06/03/19  0532 06/04/19  0600   WBC 3.76* 3.57*   HGB 13.4* 12.7*   HCT 40.8 38.4*    179   , Troponin   Recent Labs   Lab 06/04/19  1224   TROPONINI 1.669*    and All pertinent lab results from the last 24 hours have been reviewed.    Significant Imaging: Echocardiogram:   2D echo with color flow doppler:   Results for orders placed or performed during the hospital encounter of 05/30/19   2D echo with color flow doppler   Result Value Ref Range    QEF 55 55 - 65    Diastolic Dysfunction No     Est. PA Systolic Pressure 15.25    , EKG: Reviewed and X-Ray: CXR: X-Ray Chest 1 View (CXR): No results found for this visit on 05/30/19. and X-Ray Chest PA and Lateral (CXR): No results found for this visit on 05/30/19.    Assessment and Plan:   Patient who presents with recurrent NSTEMI (vasospasm vs pericarditis). Will continue high dose ASA and colchicine in addition to amlodipine and Imdur. Follow-up in clinic.     * NSTEMI (non-ST elevated myocardial infarction)  -Patient who presents with recurrent/NSTEMI  -? Coronary vasospasm vs pericarditis  -Continue ASA, Plavix, amlodipine, Imdur, statin  -Reviewed cath images in detail-normal coronaries; no plans for repeat angiogram per Dr. Bojorquez  -Will treat for pericarditis as well and  assess response  -Increase ASA to 650 mg TID  -Start colchicine 0.6 mg BID  -Monitor overnight, repeat labs to assess kidney function    6/4/19  -Stable overnight  -Still has some mild pleuritic discomfort, but greatly improved  -No SOB  -Per Dr. Bojorquez, will continue treatment for pericarditis  -Stop Plavix per MD (normal coronaries)  -Continue  mg BID x 1 week; then 650 mg once daily x 2 weeks  -Continue Colchicine 0.6 mg BID x 1 month  -No ACEi/ARB indicated as EF is normal  -No BB given worsening symptoms on Metoprolol  -Follow-up in clinic    Coronary vasospasm  -Tolerating meds  -Continue amlodipine, Imdur  -Chest pain free overnight    6/4/19  -Stable CV wise  -Troponin trending down  -Continue amlodipine, Imdur    Abnormal ECG  -EKG with chronic abnormalities in inferior leads that have been present since initial admission  -No acute changes    Hypertension  -Continue amlodipine, Imdur     Hyperlipidemia  -Continue statin        VTE Risk Mitigation (From admission, onward)        Ordered     enoxaparin injection 40 mg  Daily      06/02/19 1342     IP VTE HIGH RISK PATIENT  Once      05/30/19 2144     Place SARY hose  Until discontinued      05/30/19 2144     Place sequential compression device  Until discontinued      05/30/19 2144     Reason for No Pharmacological VTE Prophylaxis  Once      05/30/19 2144          Angeline Orourke PA-C  Cardiology  [facility]    Chart reviewed. Dr. Bojorquez examined patient and agrees with plan as outlined above.

## 2019-06-04 NOTE — ASSESSMENT & PLAN NOTE
-EKG with chronic abnormalities in inferior leads that have been present since initial admission  -No acute changes

## 2019-06-04 NOTE — PLAN OF CARE
Problem: Adult Inpatient Plan of Care  Goal: Plan of Care Review  Outcome: Ongoing (interventions implemented as appropriate)  POC reviewed, verbalized understanding. Pt remained free from falls, fall precautions in place. Pt is SR on monitor, 60-70s. VSS. No CP reported this shift. PIV intact and infusing. Call bell and personal belongings within reach. Hourly rounding complete. Reminded to call for assistance. Will continue to monitor.

## 2019-06-04 NOTE — PLAN OF CARE
JON sent a message to Dr Bojorquez's office for hospital follow up appointment     06/04/19 5389   Final Note   Assessment Type Final Discharge Note   Anticipated Discharge Disposition Home   Right Care Referral Info   Post Acute Recommendation No Care

## 2019-06-04 NOTE — ASSESSMENT & PLAN NOTE
-Tolerating meds  -Continue amlodipine, Imdur  -Chest pain free overnight    6/4/19  -Stable CV wise  -Troponin trending down  -Continue amlodipine, Imdur

## 2019-06-04 NOTE — ASSESSMENT & PLAN NOTE
-Patient who presents with recurrent/NSTEMI  -? Coronary vasospasm vs pericarditis  -Continue ASA, Plavix, amlodipine, Imdur, statin  -Reviewed cath images in detail-normal coronaries; no plans for repeat angiogram per Dr. Bojorquez  -Will treat for pericarditis as well and assess response  -Increase ASA to 650 mg TID  -Start colchicine 0.6 mg BID  -Monitor overnight, repeat labs to assess kidney function    6/4/19  -Stable overnight  -Still has some mild pleuritic discomfort, but greatly improved  -No SOB  -Per Dr. Bojorquez, will continue treatment for pericarditis  -Continue  mg BID x 1 week; then 650 mg once daily x 2 weeks  -Continue Colchicine 0.6 mg BID x 1 month  -Follow-up in clinic

## 2019-06-04 NOTE — NURSING
Went over discharge instructions with patient.   Stressed importance of making and keeping all follow ups as well as making prescribed medication changes.   Printed prescriptions x4 provided to pt upon discharge.  Patient verbalized understanding and has no questions in regards to discharge.  IV removed, catheter intact.  Telemetry box 8916 removed and returned to monitor tech.  Patient awaiting personal transportation, instructed to call nurse station once ride has arrived.  Primary nurse notified of pt's discharge status.

## 2019-06-06 ENCOUNTER — PATIENT OUTREACH (OUTPATIENT)
Dept: ADMINISTRATIVE | Facility: CLINIC | Age: 34
End: 2019-06-06

## 2019-06-06 NOTE — PATIENT INSTRUCTIONS
Discharge Instructions for Catheter Ablation  You have had a procedure called catheter ablation. It was used to treat an abnormal heartbeat (arrhythmia). This procedure destroyed (ablated) the cells in your heart that were causing your heart rhythm problem. During the procedure, the healthcare provider put a thin, flexible wire (catheter) into a blood vessel in your upper thigh. The provider then threaded it up to your heart.  Home care  Here are recommendations for care at home:   · You won't be able to drive yourself home because you had medicine to relax you (sedation) You will need to make arrangements for a ride. Your healthcare provider may tell you not to drive for 24 hours after the procedure.  · You should be able to go back to your normal daily activities in the next 1 to 2 days. These include walking, climbing stairs, and doing household chores.  · Don't do any heavy physical activity for several days after the procedure. This will allow your body to heal.  · Ask your healthcare provider when you can return to work.  · Take your temperature and check your incision for signs of infection every day for a week. Signs of infection include redness, swelling, drainage, or warmth at the incision site. It is normal to have a small bruise or lump where the catheter was inserted.  · Take your medicines exactly as directed. Dont skip doses. You may need to make some changes in your medicines because of the ablation procedure. Be sure to go over your medicine instructions with your healthcare provider before you are discharged.  · Learn to take your own pulse. Keep a record of your results. Ask your healthcare provider which readings mean that you need medical attention.  · Don't lift heavy objects for a period of time after your ablation. Ask your healthcare provider for specific advice.  Follow-up care  Make a follow-up appointment as directed by your healthcare provider. Your provider will check how your  incision site is healing. In many cases, one ablation is enough to treat an arrhythmia. But sometimes the problem comes back or another is found. If this happens, you may need a second procedure.  When to call your healthcare provider  Call your healthcare provider right away if you have any of the following:  · Redness, pain, swelling, bleeding, or drainage from your incision  · Chest pain, shortness of breath, or dizziness  · Temperature of 100.4°F (38.0°C) or higher, or as directed by your healthcare provider   · Sudden coldness, pain, or numbness in the leg or arm with the insertion site  · Nausea or vomiting  Note: Ask your healthcare provider what to expect about your heartbeat. Sometimes the irregularity goes away right after the procedure. Other times it may take longer to go away.   Date Last Reviewed: 10/1/2016  © 2127-3386 [employer], [employer]. [address] [place] [address]. All rights reserved. This information is not intended as a substitute for professional medical care. Always follow your healthcare professional's instructions.

## 2019-06-06 NOTE — DISCHARGE SUMMARY
[facility] Hospital Medicine  Discharge Summary      Patient Name: Oumar Mccarthy  MRN: [id number]  Admission Date: 5/30/2019  Hospital Length of Stay: 5 days  Discharge Date and Time: 6/4/2019  5:03 PM  Attending Physician: No att. providers found   Discharging Provider: Rodney Quezada MD  Primary Care Provider: Leana Troy MD      HPI:   33 year old male presents as a trasnfer from outside freestanding ER for complaint of Chest Pain. Patient evaluated in the Er. Troponin 6.819. Started on Heparin drip. HM consulted, patient accepted and transferred for admission. Patient seen and examined. Patient reports pain intermittent to left side with radiation to left arm over the past week. Patient reports at this time currently pain free. No modifying factors. No associated symptoms. Prior treatment: ASA, also patient seen/admitted for similar complaint with having a normal heart cath 5/24/19. See chart review for more details.     * No surgery found *      Hospital Course:   5/31 Pt was seen and examined at bedside . He cont with mild chest pain .Cardiology was consulted and recommend to cont Heparin drip . The  Chest CTA did not show any acute finding / The TTE did not show any acute finding .   6/1 Pt was seen and examined at bedside . He  Denies any complaint for today . Cardiology recommend to cont heparin drip  dffor today .  Cardiology add Imdur and Norvasc . The troponin level are trending down . There no indication for a LHC at this time  Per cardiology . The Chest CTA did nto show any acute finding .   6/2 Pt was seen and examined at bedside . He had a chest pain episode  Last night . The troponin level is trending down . The heparin drip was d/c .  The Imdur was changed to 2 time a day .    6/3 Pt was seen and examined at bedside . He did no have any chest pain  Overnight . The kidney function trend up today . Cardiology now think is possible pericarditis . Pt was started on   Asa and colchicine per cardiology .   6/4 Pt was seen and examined at bedside .He was determined to be suitable for d/c . Per cardiology rec Stop Plavix per MD (normal coronaries)-Continue  mg BID x 1 week; then 650 mg once daily x 2 weeks-Continue Colchicine 0.6 mg BID x 1 month-No ACEi/ARB indicated as EF is normal-No BB given worsening symptoms on Metoprolol-Follow-up in clinic. Pt has been chest pain free for more than 48 hrs . The kidney function went back to baseline .      Consults:   Consults (From admission, onward)        Status Ordering Provider     Inpatient consult to Cardiology  Once     Provider:  Shaheen Rich MD    Completed RAN BAILEY.          * NSTEMI (non-ST elevated myocardial infarction)  -Cardiology  On board  -Chest CTA did not show any acute abnormalities   -Per cardiology no LHC at this time   - The TTE did not show any  acute finding   -s/p heparin  Drip   -Cont asa ,, imdur and ccb   -BB and plavix was d/c per cardiology       Coronary vasospasm  Per Cardiology rec :Stop Plavix per MD (normal coronaries)  -Continue  mg BID x 1 week; then 650 mg once daily x 2 weeks  -Continue Colchicine 0.6 mg BID x 1 month  -No ACEi/ARB indicated as EF is normal  -No BB given worsening symptoms on Metoprolol  -Follow-up in clinic      Abnormal ECG        Hypertension  Continue home meds  Monitor tends          Hyperlipidemia  Statin          Final Active Diagnoses:    Diagnosis Date Noted POA    PRINCIPAL PROBLEM:  NSTEMI (non-ST elevated myocardial infarction) [I21.4] 05/30/2019 Yes    Coronary vasospasm [I20.1] 06/01/2019 Yes    Hypertension [I10] 05/31/2019 Yes    Abnormal ECG [R94.31] 05/31/2019 Yes    Hyperlipidemia [E78.5] 10/16/2018 Yes     Chronic      Problems Resolved During this Admission:    Diagnosis Date Noted Date Resolved POA    HAJA (acute kidney injury) [N17.9] 06/03/2019 06/04/2019 Yes    Chest pain [R07.9] 05/30/2019 06/04/2019 Yes       Discharged  Condition: stable    Disposition: Home or Self Care    Follow Up:  Follow-up Information     Leana Troy MD In 1 week.    Specialty:  Internal Medicine  Contact information:  [address]  [place] [id number] [phone]             Carloz Bojorquez MD In 2 weeks.    Specialties:  Cardiology, Cardiovascular Disease  Contact information:  [address]  [place] [address]  [phone]                 Patient Instructions:      Diet Cardiac     Notify your health care provider if you experience any of the following:  temperature >100.4     Notify your health care provider if you experience any of the following:  persistent nausea and vomiting or diarrhea     Notify your health care provider if you experience any of the following:  severe uncontrolled pain     Notify your health care provider if you experience any of the following:  redness, tenderness, or signs of infection (pain, swelling, redness, odor or green/yellow discharge around incision site)     Notify your health care provider if you experience any of the following:  difficulty breathing or increased cough     Notify your health care provider if you experience any of the following:  severe persistent headache     Notify your health care provider if you experience any of the following:  worsening rash     Notify your health care provider if you experience any of the following:  persistent dizziness, light-headedness, or visual disturbances     Notify your health care provider if you experience any of the following:  increased confusion or weakness     Notify your health care provider if you experience any of the following:     Activity as tolerated       Significant Diagnostic Studies: Labs:   BMP: No results for input(s): GLU, NA, K, CL, CO2, BUN, CREATININE, CALCIUM, MG in the last 48 hours., CMP No results for input(s): NA, K, CL, CO2, GLU, BUN, CREATININE, CALCIUM, PROT, ALBUMIN, BILITOT, ALKPHOS, AST, ALT, ANIONGAP, ESTGFRAFRICA,  EGFRNONAA in the last 48 hours., CBC No results for input(s): WBC, HGB, HCT, PLT in the last 48 hours., INR   Lab Results   Component Value Date    INR 1.0 05/31/2019    INR 1.0 05/30/2019    INR 1.0 05/24/2019    and Lipid Panel   Lab Results   Component Value Date    CHOL 194 05/24/2019    HDL 44 05/24/2019    LDLCALC 135.0 05/24/2019    TRIG 75 05/24/2019    CHOLHDL 22.7 05/24/2019       Pending Diagnostic Studies:     None         Medications:  Reconciled Home Medications:      Medication List      START taking these medications    amLODIPine 5 MG tablet  Commonly known as:  NORVASC  Take 1 tablet (5 mg total) by mouth once daily.     colchicine 0.6 mg tablet  Commonly known as:  COLCRYS  Take 1 tablet (0.6 mg total) by mouth once daily.     isosorbide mononitrate 30 MG 24 hr tablet  Commonly known as:  IMDUR  Take 1 tablet (30 mg total) by mouth 2 (two) times daily.     pantoprazole 40 MG tablet  Commonly known as:  PROTONIX  Take 1 tablet (40 mg total) by mouth once daily.        CHANGE how you take these medications    aspirin 650 MG Tbec  Take 1 tablet (650 mg total) by mouth 2 (two) times daily. After 1 week, reduce dosage to 650 mg daily x 7 days for 7 days  What changed:    · medication strength  · how much to take  · when to take this  · additional instructions        CONTINUE taking these medications    atorvastatin 40 MG tablet  Commonly known as:  LIPITOR  Take 1 tablet (40 mg total) by mouth every evening.     nitroGLYCERIN 0.4 MG SL tablet  Commonly known as:  NITROSTAT  Place 1 tablet (0.4 mg total) under the tongue every 5 (five) minutes as needed for Chest pain (do not exceed 3 doses).        STOP taking these medications    clopidogrel 75 mg tablet  Commonly known as:  PLAVIX     metoprolol tartrate 25 MG tablet  Commonly known as:  LOPRESSOR            Indwelling Lines/Drains at time of discharge:   Lines/Drains/Airways          None          Time spent on the discharge of patient: 35   minutes  Patient was seen and examined on the date of discharge and determined to be suitable for discharge.         Rodney Quezada MD  Department of Hospital Medicine  [facility]

## 2019-06-06 NOTE — ASSESSMENT & PLAN NOTE
Per Cardiology rec :Stop Plavix per MD (normal coronaries)  -Continue  mg BID x 1 week; then 650 mg once daily x 2 weeks  -Continue Colchicine 0.6 mg BID x 1 month  -No ACEi/ARB indicated as EF is normal  -No BB given worsening symptoms on Metoprolol  -Follow-up in clinic

## 2019-06-25 ENCOUNTER — OFFICE VISIT (OUTPATIENT)
Dept: CARDIOLOGY | Facility: CLINIC | Age: 34
End: 2019-06-25
Payer: COMMERCIAL

## 2019-06-25 VITALS
SYSTOLIC BLOOD PRESSURE: 102 MMHG | BODY MASS INDEX: 28.15 KG/M2 | DIASTOLIC BLOOD PRESSURE: 70 MMHG | HEIGHT: 74 IN | WEIGHT: 219.38 LBS | HEART RATE: 76 BPM

## 2019-06-25 DIAGNOSIS — I30.0 ACUTE IDIOPATHIC PERICARDITIS: ICD-10-CM

## 2019-06-25 PROBLEM — I31.9 PERICARDITIS: Status: ACTIVE | Noted: 2019-06-25

## 2019-06-25 PROCEDURE — 99214 PR OFFICE/OUTPT VISIT, EST, LEVL IV, 30-39 MIN: ICD-10-PCS | Mod: S$GLB,,, | Performed by: INTERNAL MEDICINE

## 2019-06-25 PROCEDURE — 99214 OFFICE O/P EST MOD 30 MIN: CPT | Mod: S$GLB,,, | Performed by: INTERNAL MEDICINE

## 2019-06-25 PROCEDURE — 99999 PR PBB SHADOW E&M-EST. PATIENT-LVL III: CPT | Mod: PBBFAC,,, | Performed by: INTERNAL MEDICINE

## 2019-06-25 PROCEDURE — 99999 PR PBB SHADOW E&M-EST. PATIENT-LVL III: ICD-10-PCS | Mod: PBBFAC,,, | Performed by: INTERNAL MEDICINE

## 2019-06-25 RX ORDER — ASPIRIN 81 MG/1
81 TABLET ORAL DAILY
COMMUNITY
End: 2019-06-25 | Stop reason: ALTCHOICE

## 2019-06-25 RX ORDER — COLCHICINE 0.6 MG/1
0.6 TABLET ORAL DAILY
Qty: 30 TABLET | Refills: 1 | OUTPATIENT
Start: 2019-06-25 | End: 2019-10-18

## 2019-06-25 NOTE — PROGRESS NOTES
Subjective:   Patient ID:  Oumar Mccarthy is a 33 y.o. male who presents for follow up of Hospital Follow Up      32 yo male, discharge f/u  , admitted for NSTEMI and had LHC done by Dr. Rich, showing angiographically normal, normal LVEF.   One week later, admitted again for chest pain and troponin elevated again to peak 6.8   Echo showed normal EF. No ekg change  Rx with acute preicaridtis, Added  mg tid and colchicine 0.6 mg bid  Today, states that weaned off ASA to 81 mg daily and on Colchicine 0.6 mg daily  No recurrent CP. No dyspnea        Past Medical History:   Diagnosis Date    Adult general medical examination 11/28/2016    Hyperlipidemia     Leukopenia     NSTEMI (non-ST elevated myocardial infarction)        Past Surgical History:   Procedure Laterality Date    CATHETERIZATION, HEART, LEFT Left 5/24/2019    Performed by Shaheen Rich MD at [facility] CATH LAB       Social History     Tobacco Use    Smoking status: Never Smoker    Smokeless tobacco: Never Used   Substance Use Topics    Alcohol use: Yes     Comment: social    Drug use: No       Family History   Problem Relation Age of Onset    Heart disease Paternal Grandfather     Hyperlipidemia Paternal Grandfather          Review of Systems   Constitution: Negative for decreased appetite, diaphoresis, fever, malaise/fatigue and night sweats.   HENT: Negative for nosebleeds.    Eyes: Negative for blurred vision and double vision.   Cardiovascular: Negative for chest pain, claudication, dyspnea on exertion, irregular heartbeat, leg swelling, near-syncope, orthopnea, palpitations, paroxysmal nocturnal dyspnea and syncope.   Respiratory: Negative for cough, shortness of breath, sleep disturbances due to breathing, snoring, sputum production and wheezing.    Endocrine: Negative for cold intolerance and polyuria.   Hematologic/Lymphatic: Does not bruise/bleed easily.   Skin: Negative for rash.   Musculoskeletal: Negative for  back pain, falls, joint pain, joint swelling and neck pain.   Gastrointestinal: Negative for abdominal pain, heartburn, nausea and vomiting.   Genitourinary: Negative for dysuria, frequency and hematuria.   Neurological: Negative for difficulty with concentration, dizziness, focal weakness, headaches, light-headedness, numbness, seizures and weakness.   Psychiatric/Behavioral: Negative for depression, memory loss and substance abuse. The patient does not have insomnia.    Allergic/Immunologic: Negative for HIV exposure and hives.       Objective:   Physical Exam   Constitutional: He is oriented to person, place, and time. He appears well-nourished.   HENT:   Head: Normocephalic.   Eyes: Pupils are equal, round, and reactive to light.   Neck: Normal carotid pulses and no JVD present. Carotid bruit is not present. No thyromegaly present.   Cardiovascular: Normal rate, regular rhythm, normal heart sounds and normal pulses.  No extrasystoles are present. PMI is not displaced. Exam reveals no gallop and no S3.   No murmur heard.  Pulmonary/Chest: Breath sounds normal. No stridor. No respiratory distress.   Abdominal: Soft. Bowel sounds are normal. There is no tenderness. There is no rebound.   Musculoskeletal: Normal range of motion.   Neurological: He is alert and oriented to person, place, and time.   Skin: Skin is intact. No rash noted.   Psychiatric: His behavior is normal.       Lab Results   Component Value Date    CHOL 194 05/24/2019    CHOL 201 (H) 06/29/2018    CHOL 154 10/20/2017     Lab Results   Component Value Date    HDL 44 05/24/2019    HDL 39 (L) 06/29/2018    HDL 39 (L) 10/20/2017     Lab Results   Component Value Date    LDLCALC 135.0 05/24/2019    LDLCALC 144 (H) 06/29/2018    LDLCALC 102 (H) 10/20/2017     Lab Results   Component Value Date    TRIG 75 05/24/2019    TRIG 90 06/29/2018    TRIG 67 10/20/2017     Lab Results   Component Value Date    CHOLHDL 22.7 05/24/2019       Chemistry         Component Value Date/Time     06/04/2019 0559    K 4.0 06/04/2019 0559     06/04/2019 0559    CO2 26 06/04/2019 0559    BUN 16 06/04/2019 0559    CREATININE 1.1 06/04/2019 0559    GLU 91 06/04/2019 0559        Component Value Date/Time    CALCIUM 9.3 06/04/2019 0559    ALKPHOS 65 05/30/2019 1430    AST 35 05/30/2019 1430    ALT 25 05/30/2019 1430    BILITOT 0.6 05/30/2019 1430    ESTGFRAFRICA >60 06/04/2019 0559    EGFRNONAA >60 06/04/2019 0559          Lab Results   Component Value Date    HGBA1C 5.3 05/24/2019     Lab Results   Component Value Date    TSH 2.008 05/31/2019     Lab Results   Component Value Date    INR 1.0 05/31/2019    INR 1.0 05/30/2019    INR 1.0 05/24/2019     Lab Results   Component Value Date    WBC 3.57 (L) 06/04/2019    HGB 12.7 (L) 06/04/2019    HCT 38.4 (L) 06/04/2019    MCV 86 06/04/2019     06/04/2019     BMP  Sodium   Date Value Ref Range Status   06/04/2019 139 136 - 145 mmol/L Final     Potassium   Date Value Ref Range Status   06/04/2019 4.0 3.5 - 5.1 mmol/L Final     Chloride   Date Value Ref Range Status   06/04/2019 107 95 - 110 mmol/L Final     CO2   Date Value Ref Range Status   06/04/2019 26 23 - 29 mmol/L Final     BUN, Bld   Date Value Ref Range Status   06/04/2019 16 6 - 20 mg/dL Final     Creatinine   Date Value Ref Range Status   06/04/2019 1.1 0.5 - 1.4 mg/dL Final     Calcium   Date Value Ref Range Status   06/04/2019 9.3 8.7 - 10.5 mg/dL Final     Anion Gap   Date Value Ref Range Status   06/04/2019 6 (L) 8 - 16 mmol/L Final     eGFR if    Date Value Ref Range Status   06/04/2019 >60 >60 mL/min/1.73 m^2 Final     eGFR if non    Date Value Ref Range Status   06/04/2019 >60 >60 mL/min/1.73 m^2 Final     Comment:     Calculation used to obtain the estimated glomerular filtration  rate (eGFR) is the CKD-EPI equation.        BNP  @LABRCNTIP(BNP,BNPTRIAGEBLO)@  @LABRCNTIP(troponini)@  CrCl cannot be calculated (Patient's  most recent lab result is older than the maximum 7 days allowed.).  No results found in the last 24 hours.  No results found in the last 24 hours.  No results found in the last 24 hours.    Assessment:      1. Acute idiopathic pericarditis      CP free    Plan:   Continue Colchicine 0.6 mg daily for 4 weeks and wean off  Wean off ASA 81mg now  D/c imdur and Lipitor  Continue Amlodipine for HTN  Recommend heart-healthy diet, weight control and regular exercise.  Anna. Risk modification.   RTC in 6 months. If recurrent CP, call the office or go to ER    I have reviewed all pertinent labs and cardiac studies. Plans and recommendations have been formulated under my direct supervision. All questions answered and patient voiced understanding. Patient to continue current medications.

## 2019-10-18 PROBLEM — I10 ESSENTIAL HYPERTENSION, BENIGN: Status: ACTIVE | Noted: 2019-10-18

## 2019-10-18 PROBLEM — F52.0 DECREASED SEXUAL DESIRE: Status: ACTIVE | Noted: 2019-10-18

## 2019-12-17 ENCOUNTER — OFFICE VISIT (OUTPATIENT)
Dept: CARDIOLOGY | Facility: CLINIC | Age: 34
End: 2019-12-17
Payer: COMMERCIAL

## 2019-12-17 VITALS
OXYGEN SATURATION: 99 % | SYSTOLIC BLOOD PRESSURE: 108 MMHG | HEART RATE: 73 BPM | BODY MASS INDEX: 29.13 KG/M2 | DIASTOLIC BLOOD PRESSURE: 80 MMHG | WEIGHT: 226.88 LBS

## 2019-12-17 DIAGNOSIS — E78.49 OTHER HYPERLIPIDEMIA: Chronic | ICD-10-CM

## 2019-12-17 DIAGNOSIS — I30.0 ACUTE IDIOPATHIC PERICARDITIS: Primary | ICD-10-CM

## 2019-12-17 DIAGNOSIS — R94.31 NONSPECIFIC ABNORMAL ELECTROCARDIOGRAM (ECG) (EKG): ICD-10-CM

## 2019-12-17 PROCEDURE — 99213 OFFICE O/P EST LOW 20 MIN: CPT | Mod: S$GLB,,, | Performed by: INTERNAL MEDICINE

## 2019-12-17 PROCEDURE — 99213 PR OFFICE/OUTPT VISIT, EST, LEVL III, 20-29 MIN: ICD-10-PCS | Mod: S$GLB,,, | Performed by: INTERNAL MEDICINE

## 2019-12-17 PROCEDURE — 99999 PR PBB SHADOW E&M-EST. PATIENT-LVL III: ICD-10-PCS | Mod: PBBFAC,,, | Performed by: INTERNAL MEDICINE

## 2019-12-17 PROCEDURE — 99999 PR PBB SHADOW E&M-EST. PATIENT-LVL III: CPT | Mod: PBBFAC,,, | Performed by: INTERNAL MEDICINE

## 2019-12-17 NOTE — PROGRESS NOTES
Subjective:   Patient ID:  Oumar Mccarthy is a 34 y.o. male who presents for follow up of No chief complaint on file.      34 yo male, 6 months f/u. 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  , admitted for NSTEMI and had LHC done by Dr. Rich, showing angiographically normal, normal LVEF.   One week later, admitted again for chest pain and troponin elevated again to peak 6.8   Echo showed normal EF. No ekg change  Rx with acute preicaridtis, Added  mg tid and colchicine 0.6 mg bid  Today, states that weaned off ASA and Colchicine 0.6 mg. No recurrent chest pain.           Past Medical History:   Diagnosis Date    Adult general medical examination 11/28/2016    Hyperlipidemia     Leukopenia     NSTEMI (non-ST elevated myocardial infarction)        Past Surgical History:   Procedure Laterality Date    LEFT HEART CATHETERIZATION Left 5/24/2019    Procedure: CATHETERIZATION, HEART, LEFT;  Surgeon: Shaheen Rich MD;  Location: [facility] CATH LAB;  Service: Cardiology;  Laterality: Left;       Social History     Tobacco Use    Smoking status: Never Smoker    Smokeless tobacco: Never Used   Substance Use Topics    Alcohol use: Yes     Comment: social    Drug use: No       Family History   Problem Relation Age of Onset    Heart disease Paternal Grandfather     Hyperlipidemia Paternal Grandfather          Review of Systems   Constitution: Negative for decreased appetite, diaphoresis, fever, malaise/fatigue and night sweats.   HENT: Negative for nosebleeds.    Eyes: Negative for blurred vision and double vision.   Cardiovascular: Negative for chest pain, claudication, dyspnea on exertion, irregular heartbeat, leg swelling, near-syncope, orthopnea, palpitations, paroxysmal nocturnal dyspnea and syncope.   Respiratory: Negative for cough, shortness of breath, sleep disturbances due to breathing, snoring, sputum production and wheezing.    Endocrine: Negative for cold intolerance and polyuria.    Hematologic/Lymphatic: Does not bruise/bleed easily.   Skin: Negative for rash.   Musculoskeletal: Negative for back pain, falls, joint pain, joint swelling and neck pain.   Gastrointestinal: Negative for abdominal pain, heartburn, nausea and vomiting.   Genitourinary: Negative for dysuria, frequency and hematuria.   Neurological: Negative for difficulty with concentration, dizziness, focal weakness, headaches, light-headedness, numbness, seizures and weakness.   Psychiatric/Behavioral: Negative for depression, memory loss and substance abuse. The patient does not have insomnia.    Allergic/Immunologic: Negative for HIV exposure and hives.       Objective:   Physical Exam   Constitutional: He is oriented to person, place, and time. He appears well-nourished.   HENT:   Head: Normocephalic.   Eyes: Pupils are equal, round, and reactive to light.   Neck: Normal carotid pulses and no JVD present. Carotid bruit is not present. No thyromegaly present.   Cardiovascular: Normal rate, regular rhythm, normal heart sounds and normal pulses.  No extrasystoles are present. PMI is not displaced. Exam reveals no gallop and no S3.   No murmur heard.  Pulmonary/Chest: Breath sounds normal. No stridor. No respiratory distress.   Abdominal: Soft. Bowel sounds are normal. There is no tenderness. There is no rebound.   Musculoskeletal: Normal range of motion.   Neurological: He is alert and oriented to person, place, and time.   Skin: Skin is intact. No rash noted.   Psychiatric: His behavior is normal.       Lab Results   Component Value Date    CHOL 208 (H) 07/23/2019    CHOL 194 05/24/2019    CHOL 201 (H) 06/29/2018     Lab Results   Component Value Date    HDL 44 07/23/2019    HDL 44 05/24/2019    HDL 39 (L) 06/29/2018     Lab Results   Component Value Date    LDLCALC 153 (H) 07/23/2019    LDLCALC 135.0 05/24/2019    LDLCALC 144 (H) 06/29/2018     Lab Results   Component Value Date    TRIG 56 07/23/2019    TRIG 75 05/24/2019     TRIG 90 06/29/2018     Lab Results   Component Value Date    CHOLHDL 22.7 05/24/2019       Chemistry        Component Value Date/Time     07/23/2019 0711    K 4.7 07/23/2019 0711     07/23/2019 0711    CO2 24 07/23/2019 0711    BUN 14 07/23/2019 0711    CREATININE 1.01 07/23/2019 0711    GLU 87 07/23/2019 0711        Component Value Date/Time    CALCIUM 9.7 07/23/2019 0711    ALKPHOS 60 07/23/2019 0711    AST 22 07/23/2019 0711    ALT 15 07/23/2019 0711    BILITOT 1.0 07/23/2019 0711    ESTGFRAFRICA >60 06/04/2019 0559    EGFRNONAA 96 07/23/2019 0711          Lab Results   Component Value Date    HGBA1C 5.3 05/24/2019     Lab Results   Component Value Date    TSH 2.008 05/31/2019     Lab Results   Component Value Date    INR 1.0 05/31/2019    INR 1.0 05/30/2019    INR 1.0 05/24/2019     Lab Results   Component Value Date    WBC 3.57 (L) 06/04/2019    HGB 12.7 (L) 06/04/2019    HCT 38.4 (L) 06/04/2019    MCV 86 06/04/2019     06/04/2019     BMP  Sodium   Date Value Ref Range Status   07/23/2019 141 134 - 144 mmol/L Final     Potassium   Date Value Ref Range Status   07/23/2019 4.7 3.5 - 5.2 mmol/L Final     Chloride   Date Value Ref Range Status   07/23/2019 103 96 - 106 mmol/L Final     CO2   Date Value Ref Range Status   07/23/2019 24 20 - 29 mmol/L Final     BUN, Bld   Date Value Ref Range Status   07/23/2019 14 6 - 20 mg/dL Final     Creatinine   Date Value Ref Range Status   07/23/2019 1.01 0.76 - 1.27 mg/dL Final     Calcium   Date Value Ref Range Status   07/23/2019 9.7 8.7 - 10.2 mg/dL Final     Anion Gap   Date Value Ref Range Status   06/04/2019 6 (L) 8 - 16 mmol/L Final     eGFR if    Date Value Ref Range Status   06/04/2019 >60 >60 mL/min/1.73 m^2 Final     eGFR if non    Date Value Ref Range Status   07/23/2019 96 >59 mL/min/1.73 Final     BNP  @LABRCNTIP(BNP,BNPTRIAGEBLO)@  @LABRCNTIP(troponini)@  CrCl cannot be calculated (Patient's most recent lab  result is older than the maximum 7 days allowed.).  No results found in the last 24 hours.  No results found in the last 24 hours.  No results found in the last 24 hours.    Assessment:      1. Acute idiopathic pericarditis    2. Nonspecific abnormal electrocardiogram (ECG) (EKG)    3. Other hyperlipidemia      No recurrent CP    Plan:   F/u with pcp for HLD  DASH  Recommend heart-healthy diet, weight control and regular exercise.  Anna. Risk modification.     I have reviewed all pertinent labs and cardiac studies. Plans and recommendations have been formulated under my direct supervision. All questions answered and patient voiced understanding. Patient to continue current medications.

## 2020-01-20 PROBLEM — Z00.00 ANNUAL PHYSICAL EXAM: Status: RESOLVED | Noted: 2018-10-16 | Resolved: 2020-01-20

## 2020-11-13 PROBLEM — R94.31 NONSPECIFIC ABNORMAL ELECTROCARDIOGRAM (ECG) (EKG): Status: RESOLVED | Noted: 2019-05-28 | Resolved: 2020-11-13

## 2020-11-13 PROBLEM — D72.819 LEUKOPENIA: Status: RESOLVED | Noted: 2018-10-16 | Resolved: 2020-11-13

## 2021-04-28 ENCOUNTER — PATIENT MESSAGE (OUTPATIENT)
Dept: RESEARCH | Facility: HOSPITAL | Age: 36
End: 2021-04-28

## 2022-10-11 PROBLEM — H60.312 ACUTE DIFFUSE OTITIS EXTERNA OF LEFT EAR: Status: ACTIVE | Noted: 2022-10-11

## 2022-12-15 NOTE — ASSESSMENT & PLAN NOTE
-Cardiology  On board  -Chest CTA did not show any acute abnormalities   -Per cardiology no LHC at this time   - The TTE did not show any  acute finding        Albendazole Counseling:  I discussed with the patient the risks of albendazole including but not limited to cytopenia, kidney damage, nausea/vomiting and severe allergy.  The patient understands that this medication is being used in an off-label manner.

## 2023-03-04 NOTE — SUBJECTIVE & OBJECTIVE
Addendum  created 03/04/23 1116 by Alfredo Davis MD    Clinical Note Signed Interval History: \    Review of Systems   Constitutional: Negative for chills, diaphoresis, fatigue and fever.   HENT: Negative for congestion, sore throat and voice change.    Eyes: Negative for photophobia and visual disturbance.   Respiratory: Negative for cough, shortness of breath, wheezing and stridor.    Cardiovascular: Negative for chest pain and leg swelling.   Gastrointestinal: Negative for abdominal distention, abdominal pain, constipation, diarrhea, nausea and vomiting.   Endocrine: Negative for polydipsia, polyphagia and polyuria.   Genitourinary: Negative for difficulty urinating, dysuria, flank pain, testicular pain and urgency.   Musculoskeletal: Negative for back pain, joint swelling, neck pain and neck stiffness.   Skin: Negative for color change and rash.   Allergic/Immunologic: Negative for immunocompromised state.   Neurological: Negative for dizziness, syncope, weakness, numbness and headaches.   Hematological: Does not bruise/bleed easily.   Psychiatric/Behavioral: Negative for agitation, behavioral problems and confusion.     Objective:     Vital Signs (Most Recent):  Temp: 97.7 °F (36.5 °C) (06/02/19 1154)  Pulse: 67 (06/02/19 1154)  Resp: 18 (06/02/19 1154)  BP: 108/61 (06/02/19 1154)  SpO2: 100 % (06/02/19 1154) Vital Signs (24h Range):  Temp:  [97.7 °F (36.5 °C)-98.9 °F (37.2 °C)] 97.7 °F (36.5 °C)  Pulse:  [62-85] 67  Resp:  [16-18] 18  SpO2:  [99 %-100 %] 100 %  BP: (107-121)/(57-72) 108/61     Weight: 100 kg (220 lb 7.4 oz)  Body mass index is 28.31 kg/m².    Intake/Output Summary (Last 24 hours) at 6/2/2019 1417  Last data filed at 6/2/2019 1024  Gross per 24 hour   Intake 355.7 ml   Output 900 ml   Net -544.3 ml      Physical Exam   Constitutional: He is oriented to person, place, and time. He appears well-developed and well-nourished. No distress.   HENT:   Head: Normocephalic and atraumatic.   Nose: Nose normal.   Eyes: Pupils are equal, round, and reactive to light. Conjunctivae  and EOM are normal. No scleral icterus.   Neck: Normal range of motion. Neck supple. No tracheal deviation present.   Cardiovascular: Normal rate, regular rhythm, normal heart sounds and intact distal pulses.   No murmur heard.  Pulmonary/Chest: Effort normal and breath sounds normal. No stridor. No respiratory distress. He has no wheezes. He has no rales.   Abdominal: Soft. Bowel sounds are normal. He exhibits no distension. There is no tenderness. There is no guarding.   Musculoskeletal: Normal range of motion. He exhibits no edema or deformity.   Neurological: He is alert and oriented to person, place, and time. No cranial nerve deficit.   Skin: Skin is warm and dry. Capillary refill takes less than 2 seconds. No rash noted. He is not diaphoretic.   Psychiatric: He has a normal mood and affect. His behavior is normal. Judgment and thought content normal.   Nursing note and vitals reviewed.      Significant Labs: All pertinent labs within the past 24 hours have been reviewed.    Significant Imaging: I have reviewed all pertinent imaging results/findings within the past 24 hours.

## 2023-08-01 ENCOUNTER — HOSPITAL ENCOUNTER (OUTPATIENT)
Facility: HOSPITAL | Age: 38
Discharge: HOME | End: 2023-08-03
Attending: EMERGENCY MEDICINE | Admitting: HOSPITALIST
Payer: COMMERCIAL

## 2023-08-01 DIAGNOSIS — I10 ESSENTIAL HYPERTENSION, BENIGN: ICD-10-CM

## 2023-08-01 DIAGNOSIS — E78.2 MIXED HYPERLIPIDEMIA: Chronic | ICD-10-CM

## 2023-08-01 DIAGNOSIS — I21.4 NSTEMI (NON-ST ELEVATED MYOCARDIAL INFARCTION): ICD-10-CM

## 2023-08-01 DIAGNOSIS — I21.4 NON-ST ELEVATION MYOCARDIAL INFARCTION (NSTEMI): ICD-10-CM

## 2023-08-01 DIAGNOSIS — I10 HYPERTENSION, UNSPECIFIED TYPE: ICD-10-CM

## 2023-08-01 DIAGNOSIS — R07.9 CHEST PAIN: ICD-10-CM

## 2023-08-01 DIAGNOSIS — I31.9 MYOPERICARDITIS: Primary | ICD-10-CM

## 2023-08-01 DIAGNOSIS — I21.4 NSTEMI (NON-ST ELEVATION MYOCARDIAL INFARCTION): ICD-10-CM

## 2023-08-01 LAB
ALBUMIN SERPL BCP-MCNC: 4.1 G/DL (ref 3.5–5.2)
ALP SERPL-CCNC: 61 U/L (ref 55–135)
ALT SERPL W/O P-5'-P-CCNC: 25 U/L (ref 10–44)
ANION GAP SERPL CALC-SCNC: 8 MMOL/L (ref 8–16)
APTT PPP: 25.6 SEC (ref 21–32)
AST SERPL-CCNC: 33 U/L (ref 10–40)
BASOPHILS # BLD AUTO: 0.03 K/UL (ref 0–0.2)
BASOPHILS NFR BLD: 0.5 % (ref 0–1.9)
BILIRUB SERPL-MCNC: 0.7 MG/DL (ref 0.1–1)
BNP SERPL-MCNC: 13 PG/ML (ref 0–99)
BUN SERPL-MCNC: 13 MG/DL (ref 6–20)
CALCIUM SERPL-MCNC: 9.5 MG/DL (ref 8.7–10.5)
CHLORIDE SERPL-SCNC: 105 MMOL/L (ref 95–110)
CO2 SERPL-SCNC: 27 MMOL/L (ref 23–29)
CREAT SERPL-MCNC: 1.1 MG/DL (ref 0.5–1.4)
DIFFERENTIAL METHOD: NORMAL
EOSINOPHIL # BLD AUTO: 0.1 K/UL (ref 0–0.5)
EOSINOPHIL NFR BLD: 2 % (ref 0–8)
ERYTHROCYTE [DISTWIDTH] IN BLOOD BY AUTOMATED COUNT: 13.6 % (ref 11.5–14.5)
EST. GFR  (NO RACE VARIABLE): >60 ML/MIN/1.73 M^2
GLUCOSE SERPL-MCNC: 85 MG/DL (ref 70–110)
HCT VFR BLD AUTO: 43 % (ref 40–54)
HGB BLD-MCNC: 14.1 G/DL (ref 14–18)
IMM GRANULOCYTES # BLD AUTO: 0.03 K/UL (ref 0–0.04)
IMM GRANULOCYTES NFR BLD AUTO: 0.5 % (ref 0–0.5)
INR PPP: 1 (ref 0.8–1.2)
LYMPHOCYTES # BLD AUTO: 2.5 K/UL (ref 1–4.8)
LYMPHOCYTES NFR BLD: 37.9 % (ref 18–48)
MCH RBC QN AUTO: 28 PG (ref 27–31)
MCHC RBC AUTO-ENTMCNC: 32.8 G/DL (ref 32–36)
MCV RBC AUTO: 86 FL (ref 82–98)
MONOCYTES # BLD AUTO: 0.4 K/UL (ref 0.3–1)
MONOCYTES NFR BLD: 5.8 % (ref 4–15)
NEUTROPHILS # BLD AUTO: 3.5 K/UL (ref 1.8–7.7)
NEUTROPHILS NFR BLD: 53.3 % (ref 38–73)
NRBC BLD-RTO: 0 /100 WBC
PLATELET # BLD AUTO: 183 K/UL (ref 150–450)
PMV BLD AUTO: 10.3 FL (ref 9.2–12.9)
POTASSIUM SERPL-SCNC: 3.6 MMOL/L (ref 3.5–5.1)
PROT SERPL-MCNC: 7.6 G/DL (ref 6–8.4)
PROTHROMBIN TIME: 11.2 SEC (ref 9–12.5)
RBC # BLD AUTO: 5.03 M/UL (ref 4.6–6.2)
SODIUM SERPL-SCNC: 140 MMOL/L (ref 136–145)
TROPONIN I SERPL DL<=0.01 NG/ML-MCNC: 3.34 NG/ML (ref 0–0.03)
WBC # BLD AUTO: 6.54 K/UL (ref 3.9–12.7)

## 2023-08-01 PROCEDURE — 83036 HEMOGLOBIN GLYCOSYLATED A1C: CPT | Performed by: HOSPITALIST

## 2023-08-01 PROCEDURE — 80053 COMPREHEN METABOLIC PANEL: CPT | Performed by: NURSE PRACTITIONER

## 2023-08-01 PROCEDURE — 99291 CRITICAL CARE FIRST HOUR: CPT

## 2023-08-01 PROCEDURE — 85025 COMPLETE CBC W/AUTO DIFF WBC: CPT | Performed by: NURSE PRACTITIONER

## 2023-08-01 PROCEDURE — 86900 BLOOD TYPING SEROLOGIC ABO: CPT | Performed by: HOSPITALIST

## 2023-08-01 PROCEDURE — G0378 HOSPITAL OBSERVATION PER HR: HCPCS

## 2023-08-01 PROCEDURE — 63600175 PHARM REV CODE 636 W HCPCS: Performed by: HOSPITALIST

## 2023-08-01 PROCEDURE — 83880 ASSAY OF NATRIURETIC PEPTIDE: CPT | Performed by: NURSE PRACTITIONER

## 2023-08-01 PROCEDURE — 93010 ELECTROCARDIOGRAM REPORT: CPT | Mod: ,,, | Performed by: INTERNAL MEDICINE

## 2023-08-01 PROCEDURE — 25000003 PHARM REV CODE 250: Performed by: EMERGENCY MEDICINE

## 2023-08-01 PROCEDURE — 93010 EKG 12-LEAD: ICD-10-PCS | Mod: ,,, | Performed by: INTERNAL MEDICINE

## 2023-08-01 PROCEDURE — 85730 THROMBOPLASTIN TIME PARTIAL: CPT | Performed by: HOSPITALIST

## 2023-08-01 PROCEDURE — 84484 ASSAY OF TROPONIN QUANT: CPT | Performed by: NURSE PRACTITIONER

## 2023-08-01 PROCEDURE — 36415 COLL VENOUS BLD VENIPUNCTURE: CPT | Performed by: HOSPITALIST

## 2023-08-01 PROCEDURE — 85610 PROTHROMBIN TIME: CPT | Mod: 91 | Performed by: HOSPITALIST

## 2023-08-01 PROCEDURE — 80061 LIPID PANEL: CPT | Performed by: HOSPITALIST

## 2023-08-01 PROCEDURE — 93005 ELECTROCARDIOGRAM TRACING: CPT

## 2023-08-01 RX ORDER — SODIUM CHLORIDE 9 MG/ML
INJECTION, SOLUTION INTRAVENOUS CONTINUOUS
Status: DISCONTINUED | OUTPATIENT
Start: 2023-08-02 | End: 2023-08-02

## 2023-08-01 RX ORDER — ATORVASTATIN CALCIUM 40 MG/1
80 TABLET, FILM COATED ORAL DAILY
Status: DISCONTINUED | OUTPATIENT
Start: 2023-08-02 | End: 2023-08-03 | Stop reason: HOSPADM

## 2023-08-01 RX ORDER — METOPROLOL TARTRATE 50 MG/1
50 TABLET ORAL
Status: COMPLETED | OUTPATIENT
Start: 2023-08-01 | End: 2023-08-01

## 2023-08-01 RX ORDER — ONDANSETRON 2 MG/ML
4 INJECTION INTRAMUSCULAR; INTRAVENOUS EVERY 8 HOURS PRN
Status: DISCONTINUED | OUTPATIENT
Start: 2023-08-01 | End: 2023-08-03 | Stop reason: HOSPADM

## 2023-08-01 RX ORDER — IBUPROFEN 200 MG
24 TABLET ORAL
Status: DISCONTINUED | OUTPATIENT
Start: 2023-08-01 | End: 2023-08-03 | Stop reason: HOSPADM

## 2023-08-01 RX ORDER — TALC
6 POWDER (GRAM) TOPICAL NIGHTLY PRN
Status: DISCONTINUED | OUTPATIENT
Start: 2023-08-01 | End: 2023-08-03 | Stop reason: HOSPADM

## 2023-08-01 RX ORDER — GLUCAGON 1 MG
1 KIT INJECTION
Status: DISCONTINUED | OUTPATIENT
Start: 2023-08-01 | End: 2023-08-03 | Stop reason: HOSPADM

## 2023-08-01 RX ORDER — IBUPROFEN 200 MG
16 TABLET ORAL
Status: DISCONTINUED | OUTPATIENT
Start: 2023-08-01 | End: 2023-08-03 | Stop reason: HOSPADM

## 2023-08-01 RX ORDER — MORPHINE SULFATE 4 MG/ML
2 INJECTION, SOLUTION INTRAMUSCULAR; INTRAVENOUS EVERY 4 HOURS PRN
Status: DISCONTINUED | OUTPATIENT
Start: 2023-08-01 | End: 2023-08-03 | Stop reason: HOSPADM

## 2023-08-01 RX ORDER — HEPARIN SODIUM,PORCINE/D5W 25000/250
0-40 INTRAVENOUS SOLUTION INTRAVENOUS CONTINUOUS
Status: DISCONTINUED | OUTPATIENT
Start: 2023-08-01 | End: 2023-08-02

## 2023-08-01 RX ORDER — NAPROXEN SODIUM 220 MG/1
81 TABLET, FILM COATED ORAL DAILY
Status: DISCONTINUED | OUTPATIENT
Start: 2023-08-02 | End: 2023-08-02

## 2023-08-01 RX ORDER — MAG HYDROX/ALUMINUM HYD/SIMETH 200-200-20
30 SUSPENSION, ORAL (FINAL DOSE FORM) ORAL 4 TIMES DAILY PRN
Status: DISCONTINUED | OUTPATIENT
Start: 2023-08-01 | End: 2023-08-03 | Stop reason: HOSPADM

## 2023-08-01 RX ORDER — METOPROLOL TARTRATE 25 MG/1
25 TABLET, FILM COATED ORAL 2 TIMES DAILY
Status: DISCONTINUED | OUTPATIENT
Start: 2023-08-02 | End: 2023-08-03 | Stop reason: HOSPADM

## 2023-08-01 RX ORDER — ASPIRIN 325 MG
325 TABLET ORAL
Status: COMPLETED | OUTPATIENT
Start: 2023-08-01 | End: 2023-08-01

## 2023-08-01 RX ORDER — CLOPIDOGREL BISULFATE 75 MG/1
75 TABLET ORAL DAILY
Status: DISCONTINUED | OUTPATIENT
Start: 2023-08-02 | End: 2023-08-03 | Stop reason: HOSPADM

## 2023-08-01 RX ORDER — ACETAMINOPHEN 325 MG/1
650 TABLET ORAL EVERY 8 HOURS PRN
Status: DISCONTINUED | OUTPATIENT
Start: 2023-08-01 | End: 2023-08-03 | Stop reason: HOSPADM

## 2023-08-01 RX ORDER — AMOXICILLIN 250 MG
1 CAPSULE ORAL 2 TIMES DAILY PRN
Status: DISCONTINUED | OUTPATIENT
Start: 2023-08-01 | End: 2023-08-03 | Stop reason: HOSPADM

## 2023-08-01 RX ORDER — PROMETHAZINE HYDROCHLORIDE 25 MG/1
25 TABLET ORAL EVERY 6 HOURS PRN
Status: DISCONTINUED | OUTPATIENT
Start: 2023-08-01 | End: 2023-08-03 | Stop reason: HOSPADM

## 2023-08-01 RX ORDER — HYDROCODONE BITARTRATE AND ACETAMINOPHEN 5; 325 MG/1; MG/1
1 TABLET ORAL EVERY 6 HOURS PRN
Status: DISCONTINUED | OUTPATIENT
Start: 2023-08-01 | End: 2023-08-03 | Stop reason: HOSPADM

## 2023-08-01 RX ORDER — ACETAMINOPHEN 650 MG/1
650 SUPPOSITORY RECTAL EVERY 6 HOURS PRN
Status: DISCONTINUED | OUTPATIENT
Start: 2023-08-01 | End: 2023-08-03 | Stop reason: HOSPADM

## 2023-08-01 RX ORDER — NALOXONE HCL 0.4 MG/ML
0.02 VIAL (ML) INJECTION
Status: DISCONTINUED | OUTPATIENT
Start: 2023-08-01 | End: 2023-08-03 | Stop reason: HOSPADM

## 2023-08-01 RX ORDER — DIPHENHYDRAMINE HCL 50 MG
50 CAPSULE ORAL ONCE
Status: COMPLETED | OUTPATIENT
Start: 2023-08-02 | End: 2023-08-01

## 2023-08-01 RX ORDER — NITROGLYCERIN 0.4 MG/1
0.4 TABLET SUBLINGUAL EVERY 5 MIN PRN
Status: DISCONTINUED | OUTPATIENT
Start: 2023-08-01 | End: 2023-08-03 | Stop reason: HOSPADM

## 2023-08-01 RX ADMIN — METOPROLOL TARTRATE 50 MG: 50 TABLET, FILM COATED ORAL at 09:08

## 2023-08-01 RX ADMIN — NITROGLYCERIN 1 INCH: 20 OINTMENT TOPICAL at 09:08

## 2023-08-01 RX ADMIN — ASPIRIN 325 MG ORAL TABLET 325 MG: 325 PILL ORAL at 09:08

## 2023-08-01 RX ADMIN — HEPARIN SODIUM 12 UNITS/KG/HR: 10000 INJECTION, SOLUTION INTRAVENOUS at 11:08

## 2023-08-01 RX ADMIN — DIPHENHYDRAMINE HYDROCHLORIDE 50 MG: 50 CAPSULE ORAL at 11:08

## 2023-08-01 NOTE — Clinical Note
Referred by: Elva Hancock PA-C; Medical Diagnosis (from order):      Physical Therapy -  Daily Treatment Note    Visit:  8     SUBJECTIVE                                                                                                             Reports the past few days have been sore. She is doing her exercises about 5 times a day since she is done with the CPM, went to the gym and did the bike for 15 mins. She was climbing her stairs yesterday and got a sharp stabbing pain, felt like she needed to stretch out. She has been limping some, too.      Pain / Symptoms:  Pain rating (out of 10): Current: 4     OBJECTIVE                                                                                                                     Observation:   Comments / Details: Post op 6 weeks 1/10/20        TREATMENT                                                                                                                  Therapeutic Exercise:  *all performed in submax WB environment*  forward/backward walking x 5 min  Side stepping x 4 min   double leg squats to heel raise x 20   Lunge hip flexor stretch 2x30 sec Right/Left   Standing 3-way hip Right/Left x 15   Seated knee flexion/extension x 20  Seated hip IR/ER x 20    Forward step up bottom step x 15 Right/Left   Lateral step up bottom step x 15 Right/Left   SL RDL x 10 Right/Left     Education in pool a couple times a week, bike every other day, maybe try HEP only 3x/day for about 15 mins.       Skilled input: verbal instruction/cues and tactile instruction/cues    Home Exercise Program: (*above indicates provided as part of home exercise program)  12/13/19: PPT, prone glute set, TKE, knee flexion, quad rocking  12/31/19: bridges (on toes), core activation, prone IR/ER, butterflies, clams, reverse clams, prone hip extension, 1/2 kneel hip flexor stretch       ASSESSMENT                                                                                                The procedural consent was signed. A history and physical note was completed in the chart.                   Patient tolerated the session well, had no pain by the end of the session. Needed occasional cuing for knee positioning.     Pain/symptoms after session: 0   PLAN                                                                                                                             Suggestions for next session as indicated: Progress per plan of care       Procedures and total treatment time documented Time Entry flowsheet.

## 2023-08-01 NOTE — Clinical Note
The catheter was inserted over the wire into the left ventricle. Hemodynamics were performed.  and Pullback was recorded.  The angiography was performed via power injection. The injected amount was 25 mL contrast at 12 mL/s.  Exchanged over J wire

## 2023-08-01 NOTE — Clinical Note
The catheter was inserted over the wire into the ostium   right coronary artery. Hemodynamics were performed.  An angiography was performed of the right coronary arteries. Multiple views were taken. Inserted over J wire

## 2023-08-01 NOTE — Clinical Note
The left radial was prepped. The site was prepped with ChloraPrep. The site was clipped. The patient was draped.

## 2023-08-02 LAB
ABO + RH BLD: NORMAL
ALBUMIN SERPL BCP-MCNC: 3.8 G/DL (ref 3.5–5.2)
ALP SERPL-CCNC: 59 U/L (ref 55–135)
ALT SERPL W/O P-5'-P-CCNC: 24 U/L (ref 10–44)
AMPHET+METHAMPHET UR QL: NEGATIVE
ANION GAP SERPL CALC-SCNC: 7 MMOL/L (ref 8–16)
AORTIC ROOT ANNULUS: 3.06 CM
APTT PPP: 25.7 SEC (ref 21–32)
APTT PPP: 53 SEC (ref 21–32)
ASCENDING AORTA: 3.07 CM
AST SERPL-CCNC: 28 U/L (ref 10–40)
AV INDEX (PROSTH): 1.01
AV MEAN GRADIENT: 2 MMHG
AV PEAK GRADIENT: 4 MMHG
AV VALVE AREA BY VELOCITY RATIO: 3.73 CM²
AV VALVE AREA: 4.25 CM²
AV VELOCITY RATIO: 0.88
BARBITURATES UR QL SCN>200 NG/ML: NEGATIVE
BASOPHILS # BLD AUTO: 0.02 K/UL (ref 0–0.2)
BASOPHILS # BLD AUTO: 0.04 K/UL (ref 0–0.2)
BASOPHILS NFR BLD: 0.4 % (ref 0–1.9)
BASOPHILS NFR BLD: 0.8 % (ref 0–1.9)
BENZODIAZ UR QL SCN>200 NG/ML: NEGATIVE
BILIRUB SERPL-MCNC: 0.8 MG/DL (ref 0.1–1)
BLD GP AB SCN CELLS X3 SERPL QL: NORMAL
BSA FOR ECHO PROCEDURE: 2.43 M2
BUN SERPL-MCNC: 12 MG/DL (ref 6–20)
BZE UR QL SCN: NEGATIVE
CALCIUM SERPL-MCNC: 9.2 MG/DL (ref 8.7–10.5)
CANNABINOIDS UR QL SCN: NEGATIVE
CATH EF QUANTITATIVE: 65 %
CHLORIDE SERPL-SCNC: 105 MMOL/L (ref 95–110)
CHOLEST SERPL-MCNC: 214 MG/DL (ref 120–199)
CHOLEST/HDLC SERPL: 5.2 {RATIO} (ref 2–5)
CO2 SERPL-SCNC: 28 MMOL/L (ref 23–29)
CREAT SERPL-MCNC: 1 MG/DL (ref 0.5–1.4)
CREAT UR-MCNC: 99.4 MG/DL (ref 23–375)
CRP SERPL-MCNC: 5.8 MG/L (ref 0–8.2)
CV ECHO LV RWT: 0.59 CM
DIFFERENTIAL METHOD: ABNORMAL
DIFFERENTIAL METHOD: ABNORMAL
DOP CALC AO PEAK VEL: 0.94 M/S
DOP CALC AO VTI: 15.2 CM
DOP CALC LVOT AREA: 4.2 CM2
DOP CALC LVOT DIAMETER: 2.32 CM
DOP CALC LVOT PEAK VEL: 0.83 M/S
DOP CALC LVOT STROKE VOLUME: 64.65 CM3
DOP CALC RVOT PEAK VEL: 0.66 M/S
DOP CALC RVOT VTI: 11 CM
DOP CALCLVOT PEAK VEL VTI: 15.3 CM
E WAVE DECELERATION TIME: 160.43 MSEC
E/A RATIO: 1.84
E/E' RATIO: 6.48 M/S
ECHO LV POSTERIOR WALL: 1.37 CM (ref 0.6–1.1)
EOSINOPHIL # BLD AUTO: 0.1 K/UL (ref 0–0.5)
EOSINOPHIL # BLD AUTO: 0.1 K/UL (ref 0–0.5)
EOSINOPHIL NFR BLD: 2.5 % (ref 0–8)
EOSINOPHIL NFR BLD: 2.6 % (ref 0–8)
ERYTHROCYTE [DISTWIDTH] IN BLOOD BY AUTOMATED COUNT: 13.6 % (ref 11.5–14.5)
ERYTHROCYTE [DISTWIDTH] IN BLOOD BY AUTOMATED COUNT: 13.7 % (ref 11.5–14.5)
ERYTHROCYTE [SEDIMENTATION RATE] IN BLOOD BY WESTERGREN METHOD: 7 MM/HR (ref 0–10)
EST. GFR  (NO RACE VARIABLE): >60 ML/MIN/1.73 M^2
ESTIMATED AVG GLUCOSE: 105 MG/DL (ref 68–131)
FRACTIONAL SHORTENING: 29 % (ref 28–44)
GLUCOSE SERPL-MCNC: 84 MG/DL (ref 70–110)
HBA1C MFR BLD: 5.3 % (ref 4–5.6)
HCT VFR BLD AUTO: 40.3 % (ref 40–54)
HCT VFR BLD AUTO: 42.2 % (ref 40–54)
HDLC SERPL-MCNC: 41 MG/DL (ref 40–75)
HDLC SERPL: 19.2 % (ref 20–50)
HGB BLD-MCNC: 13 G/DL (ref 14–18)
HGB BLD-MCNC: 13.4 G/DL (ref 14–18)
IMM GRANULOCYTES # BLD AUTO: 0.01 K/UL (ref 0–0.04)
IMM GRANULOCYTES # BLD AUTO: 0.01 K/UL (ref 0–0.04)
IMM GRANULOCYTES NFR BLD AUTO: 0.2 % (ref 0–0.5)
IMM GRANULOCYTES NFR BLD AUTO: 0.2 % (ref 0–0.5)
INR PPP: 1 (ref 0.8–1.2)
INTERVENTRICULAR SEPTUM: 1.16 CM (ref 0.6–1.1)
IVC DIAMETER: 1.46 CM
IVRT: 76.12 MSEC
LA MAJOR: 5.68 CM
LA MINOR: 4.3 CM
LA WIDTH: 4.3 CM
LDLC SERPL CALC-MCNC: 141.8 MG/DL (ref 63–159)
LEFT ATRIUM SIZE: 3.03 CM
LEFT ATRIUM VOLUME INDEX: 22.4 ML/M2
LEFT ATRIUM VOLUME: 54.21 CM3
LEFT INTERNAL DIMENSION IN SYSTOLE: 3.29 CM (ref 2.1–4)
LEFT VENTRICLE DIASTOLIC VOLUME INDEX: 40.33 ML/M2
LEFT VENTRICLE DIASTOLIC VOLUME: 97.6 ML
LEFT VENTRICLE MASS INDEX: 92 G/M2
LEFT VENTRICLE SYSTOLIC VOLUME INDEX: 18.1 ML/M2
LEFT VENTRICLE SYSTOLIC VOLUME: 43.88 ML
LEFT VENTRICULAR INTERNAL DIMENSION IN DIASTOLE: 4.61 CM (ref 3.5–6)
LEFT VENTRICULAR MASS: 221.93 G
LV LATERAL E/E' RATIO: 7.36 M/S
LV SEPTAL E/E' RATIO: 5.79 M/S
LVOT MG: 1.3 MMHG
LVOT MV: 0.52 CM/S
LYMPHOCYTES # BLD AUTO: 2.2 K/UL (ref 1–4.8)
LYMPHOCYTES # BLD AUTO: 2.5 K/UL (ref 1–4.8)
LYMPHOCYTES NFR BLD: 43 % (ref 18–48)
LYMPHOCYTES NFR BLD: 44.8 % (ref 18–48)
MCH RBC QN AUTO: 27.7 PG (ref 27–31)
MCH RBC QN AUTO: 27.8 PG (ref 27–31)
MCHC RBC AUTO-ENTMCNC: 31.8 G/DL (ref 32–36)
MCHC RBC AUTO-ENTMCNC: 32.3 G/DL (ref 32–36)
MCV RBC AUTO: 86 FL (ref 82–98)
MCV RBC AUTO: 88 FL (ref 82–98)
METHADONE UR QL SCN>300 NG/ML: NEGATIVE
MONOCYTES # BLD AUTO: 0.4 K/UL (ref 0.3–1)
MONOCYTES # BLD AUTO: 0.4 K/UL (ref 0.3–1)
MONOCYTES NFR BLD: 7.3 % (ref 4–15)
MONOCYTES NFR BLD: 7.5 % (ref 4–15)
MV PEAK A VEL: 0.44 M/S
MV PEAK E VEL: 0.81 M/S
NEUTROPHILS # BLD AUTO: 2.2 K/UL (ref 1.8–7.7)
NEUTROPHILS # BLD AUTO: 2.7 K/UL (ref 1.8–7.7)
NEUTROPHILS NFR BLD: 44.3 % (ref 38–73)
NEUTROPHILS NFR BLD: 46.4 % (ref 38–73)
NONHDLC SERPL-MCNC: 173 MG/DL
NRBC BLD-RTO: 0 /100 WBC
NRBC BLD-RTO: 0 /100 WBC
OPIATES UR QL SCN: NEGATIVE
PCP UR QL SCN>25 NG/ML: NEGATIVE
PISA TR MAX VEL: 1.8 M/S
PLATELET # BLD AUTO: 166 K/UL (ref 150–450)
PLATELET # BLD AUTO: 170 K/UL (ref 150–450)
PMV BLD AUTO: 10.1 FL (ref 9.2–12.9)
PMV BLD AUTO: 10.4 FL (ref 9.2–12.9)
POTASSIUM SERPL-SCNC: 4 MMOL/L (ref 3.5–5.1)
PROT SERPL-MCNC: 6.9 G/DL (ref 6–8.4)
PROTHROMBIN TIME: 10.7 SEC (ref 9–12.5)
PULM VEIN S/D RATIO: 1.29
PV MEAN GRADIENT: 1 MMHG
PV PEAK D VEL: 0.38 M/S
PV PEAK S VEL: 0.49 M/S
RA MAJOR: 5.92 CM
RA PRESSURE ESTIMATED: 3 MMHG
RBC # BLD AUTO: 4.69 M/UL (ref 4.6–6.2)
RBC # BLD AUTO: 4.82 M/UL (ref 4.6–6.2)
RV MID DIAMA: 3.25 CM
RV TB RVSP: 5 MMHG
SODIUM SERPL-SCNC: 140 MMOL/L (ref 136–145)
SPECIMEN OUTDATE: NORMAL
STJ: 2.86 CM
TDI LATERAL: 0.11 M/S
TDI SEPTAL: 0.14 M/S
TDI: 0.13 M/S
TOXICOLOGY INFORMATION: NORMAL
TR MAX PG: 13 MMHG
TRICUSPID ANNULAR PLANE SYSTOLIC EXCURSION: 2.8 CM
TRIGL SERPL-MCNC: 156 MG/DL (ref 30–150)
TROPONIN I SERPL DL<=0.01 NG/ML-MCNC: 2.05 NG/ML (ref 0–0.03)
TROPONIN I SERPL DL<=0.01 NG/ML-MCNC: 2.28 NG/ML (ref 0–0.03)
TROPONIN I SERPL DL<=0.01 NG/ML-MCNC: 3.25 NG/ML (ref 0–0.03)
TV REST PULMONARY ARTERY PRESSURE: 16 MMHG
WBC # BLD AUTO: 4.96 K/UL (ref 3.9–12.7)
WBC # BLD AUTO: 5.7 K/UL (ref 3.9–12.7)
Z-SCORE OF LEFT VENTRICULAR DIMENSION IN END DIASTOLE: -9.09
Z-SCORE OF LEFT VENTRICULAR DIMENSION IN END SYSTOLE: -5.78

## 2023-08-02 PROCEDURE — 85651 RBC SED RATE NONAUTOMATED: CPT | Performed by: PHYSICIAN ASSISTANT

## 2023-08-02 PROCEDURE — 84484 ASSAY OF TROPONIN QUANT: CPT | Performed by: HOSPITALIST

## 2023-08-02 PROCEDURE — 25500020 PHARM REV CODE 255: Performed by: INTERNAL MEDICINE

## 2023-08-02 PROCEDURE — C1894 INTRO/SHEATH, NON-LASER: HCPCS | Performed by: INTERNAL MEDICINE

## 2023-08-02 PROCEDURE — 93458 L HRT ARTERY/VENTRICLE ANGIO: CPT | Performed by: INTERNAL MEDICINE

## 2023-08-02 PROCEDURE — 80307 DRUG TEST PRSMV CHEM ANLYZR: CPT | Performed by: INTERNAL MEDICINE

## 2023-08-02 PROCEDURE — 80053 COMPREHEN METABOLIC PANEL: CPT | Performed by: HOSPITALIST

## 2023-08-02 PROCEDURE — 93010 EKG 12-LEAD: ICD-10-PCS | Mod: ,,, | Performed by: INTERNAL MEDICINE

## 2023-08-02 PROCEDURE — 99900035 HC TECH TIME PER 15 MIN (STAT)

## 2023-08-02 PROCEDURE — 63600175 PHARM REV CODE 636 W HCPCS: Performed by: INTERNAL MEDICINE

## 2023-08-02 PROCEDURE — 36415 COLL VENOUS BLD VENIPUNCTURE: CPT | Performed by: HOSPITALIST

## 2023-08-02 PROCEDURE — 99152 MOD SED SAME PHYS/QHP 5/>YRS: CPT | Mod: ,,, | Performed by: INTERNAL MEDICINE

## 2023-08-02 PROCEDURE — 93005 ELECTROCARDIOGRAM TRACING: CPT

## 2023-08-02 PROCEDURE — 25000003 PHARM REV CODE 250: Performed by: INTERNAL MEDICINE

## 2023-08-02 PROCEDURE — 85730 THROMBOPLASTIN TIME PARTIAL: CPT | Performed by: INTERNAL MEDICINE

## 2023-08-02 PROCEDURE — 99152 MOD SED SAME PHYS/QHP 5/>YRS: CPT | Performed by: INTERNAL MEDICINE

## 2023-08-02 PROCEDURE — 36415 COLL VENOUS BLD VENIPUNCTURE: CPT | Performed by: PHYSICIAN ASSISTANT

## 2023-08-02 PROCEDURE — 86140 C-REACTIVE PROTEIN: CPT | Performed by: PHYSICIAN ASSISTANT

## 2023-08-02 PROCEDURE — 99213 PR OFFICE/OUTPT VISIT, EST, LEVL III, 20-29 MIN: ICD-10-PCS | Mod: 25,,, | Performed by: INTERNAL MEDICINE

## 2023-08-02 PROCEDURE — 27201423 OPTIME MED/SURG SUP & DEVICES STERILE SUPPLY: Performed by: INTERNAL MEDICINE

## 2023-08-02 PROCEDURE — 94799 UNLISTED PULMONARY SVC/PX: CPT

## 2023-08-02 PROCEDURE — 99152 PR MOD CONSCIOUS SEDATION, SAME PHYS, 5+ YRS, FIRST 15 MIN: ICD-10-PCS | Mod: ,,, | Performed by: INTERNAL MEDICINE

## 2023-08-02 PROCEDURE — 96365 THER/PROPH/DIAG IV INF INIT: CPT | Mod: 59

## 2023-08-02 PROCEDURE — 25000003 PHARM REV CODE 250: Performed by: HOSPITALIST

## 2023-08-02 PROCEDURE — 96366 THER/PROPH/DIAG IV INF ADDON: CPT | Mod: 59

## 2023-08-02 PROCEDURE — 25000003 PHARM REV CODE 250: Performed by: PHYSICIAN ASSISTANT

## 2023-08-02 PROCEDURE — G0378 HOSPITAL OBSERVATION PER HR: HCPCS

## 2023-08-02 PROCEDURE — 93010 ELECTROCARDIOGRAM REPORT: CPT | Mod: ,,, | Performed by: INTERNAL MEDICINE

## 2023-08-02 PROCEDURE — 84484 ASSAY OF TROPONIN QUANT: CPT | Mod: 91 | Performed by: HOSPITALIST

## 2023-08-02 PROCEDURE — C1769 GUIDE WIRE: HCPCS | Performed by: INTERNAL MEDICINE

## 2023-08-02 PROCEDURE — 36415 COLL VENOUS BLD VENIPUNCTURE: CPT | Performed by: INTERNAL MEDICINE

## 2023-08-02 PROCEDURE — 93458 PR CATH PLACE/CORON ANGIO, IMG SUPER/INTERP,W LEFT HEART VENTRICULOGRAPHY: ICD-10-PCS | Mod: 26,,, | Performed by: INTERNAL MEDICINE

## 2023-08-02 PROCEDURE — 93458 L HRT ARTERY/VENTRICLE ANGIO: CPT | Mod: 26,,, | Performed by: INTERNAL MEDICINE

## 2023-08-02 PROCEDURE — 94761 N-INVAS EAR/PLS OXIMETRY MLT: CPT | Mod: 59

## 2023-08-02 PROCEDURE — 99213 OFFICE O/P EST LOW 20 MIN: CPT | Mod: 25,,, | Performed by: INTERNAL MEDICINE

## 2023-08-02 PROCEDURE — 85025 COMPLETE CBC W/AUTO DIFF WBC: CPT | Mod: 91 | Performed by: HOSPITALIST

## 2023-08-02 RX ORDER — SODIUM CHLORIDE 9 MG/ML
INJECTION, SOLUTION INTRAVENOUS
Status: DISCONTINUED | OUTPATIENT
Start: 2023-08-02 | End: 2023-08-03 | Stop reason: HOSPADM

## 2023-08-02 RX ORDER — NITROGLYCERIN 5 MG/ML
INJECTION, SOLUTION INTRAVENOUS
Status: DISCONTINUED | OUTPATIENT
Start: 2023-08-02 | End: 2023-08-02 | Stop reason: HOSPADM

## 2023-08-02 RX ORDER — DIPHENHYDRAMINE HYDROCHLORIDE 50 MG/ML
INJECTION INTRAMUSCULAR; INTRAVENOUS
Status: DISCONTINUED | OUTPATIENT
Start: 2023-08-02 | End: 2023-08-02 | Stop reason: HOSPADM

## 2023-08-02 RX ORDER — HEPARIN SODIUM 1000 [USP'U]/ML
INJECTION, SOLUTION INTRAVENOUS; SUBCUTANEOUS
Status: DISCONTINUED | OUTPATIENT
Start: 2023-08-02 | End: 2023-08-02 | Stop reason: HOSPADM

## 2023-08-02 RX ORDER — FENTANYL CITRATE 50 UG/ML
INJECTION, SOLUTION INTRAMUSCULAR; INTRAVENOUS
Status: DISCONTINUED | OUTPATIENT
Start: 2023-08-02 | End: 2023-08-02 | Stop reason: HOSPADM

## 2023-08-02 RX ORDER — ASPIRIN 325 MG
650 TABLET ORAL 2 TIMES DAILY
Status: DISCONTINUED | OUTPATIENT
Start: 2023-08-02 | End: 2023-08-03 | Stop reason: HOSPADM

## 2023-08-02 RX ORDER — LIDOCAINE HYDROCHLORIDE 20 MG/ML
INJECTION, SOLUTION EPIDURAL; INFILTRATION; INTRACAUDAL; PERINEURAL
Status: DISCONTINUED | OUTPATIENT
Start: 2023-08-02 | End: 2023-08-02 | Stop reason: HOSPADM

## 2023-08-02 RX ORDER — MIDAZOLAM HYDROCHLORIDE 1 MG/ML
INJECTION, SOLUTION INTRAMUSCULAR; INTRAVENOUS
Status: DISCONTINUED | OUTPATIENT
Start: 2023-08-02 | End: 2023-08-02 | Stop reason: HOSPADM

## 2023-08-02 RX ORDER — VERAPAMIL HYDROCHLORIDE 2.5 MG/ML
INJECTION, SOLUTION INTRAVENOUS
Status: DISCONTINUED | OUTPATIENT
Start: 2023-08-02 | End: 2023-08-02 | Stop reason: HOSPADM

## 2023-08-02 RX ORDER — COLCHICINE 0.6 MG/1
0.6 TABLET, FILM COATED ORAL 2 TIMES DAILY
Status: DISCONTINUED | OUTPATIENT
Start: 2023-08-02 | End: 2023-08-03 | Stop reason: HOSPADM

## 2023-08-02 RX ADMIN — CLOPIDOGREL BISULFATE 75 MG: 75 TABLET ORAL at 09:08

## 2023-08-02 RX ADMIN — METOPROLOL TARTRATE 25 MG: 25 TABLET, FILM COATED ORAL at 08:08

## 2023-08-02 RX ADMIN — ASPIRIN 650 MG: 325 TABLET ORAL at 08:08

## 2023-08-02 RX ADMIN — COLCHICINE 0.6 MG: 0.6 TABLET, FILM COATED ORAL at 08:08

## 2023-08-02 RX ADMIN — ATORVASTATIN CALCIUM 80 MG: 40 TABLET, FILM COATED ORAL at 09:08

## 2023-08-02 RX ADMIN — ASPIRIN 81 MG CHEWABLE TABLET 81 MG: 81 TABLET CHEWABLE at 09:08

## 2023-08-02 NOTE — ASSESSMENT & PLAN NOTE
-Presents with CP with radiation to L arm and elevated troponin  -Stable during exam, chest pain free  -Troponin trending down  -Echo pending  -Prior LHC in 2019 showed normal coronaries, patient also with history of pericarditis  -LHC planned today by Dr. Tellez. All risks, benefits, and treatment alternatives explained to patient in detail. All questions answered. He has agreed to proceed. Further rec's to follow.

## 2023-08-02 NOTE — HOSPITAL COURSE
37 y/o male admitted with c/o left sided chest pain that radiated to his left arm and tingling that happened while eating lunch the day of admission. He went to [facility] first and the wait time was too long and he left. They called him back because his troponin was elevated and told him to go the ER and he came to [facility]. He reports his pain and tingling has resolved since admission. He has a h/o pericarditis several years ago.   BP elevated on admission, likely reactive and r/t CP, started on metoprolol. Will not continue on discharge, normotensive.  Cardiology consulted, performed LHC on 8/2/23, negative for CAD, EF 65%, diastolic dysfunction and filling pressure on the left mildly elevated. Continue to monitor with telemetry monitoring overnight. Will treat for possible myopericarditis, started on colchicine and aspirin BID.   Inflammatory markers and D-dimer normal.     No further episodes of chest pain. Patient will discharge on ASA 81 mg and statin. Needs to follow up with primary cardiologist, Dr. Anthony Hickman, for cardiac MRI OP. Dr. Baez discussed case and plan with Dr. Hickman prior to discharge.     Follow up with PCP in 3-5 days for hospital follow up.  Follow up with cardiology in 1 week or soonest available appointment for hospital follow up.     Patient seen and examined on the day of discharge.  All questions and concerns were addressed prior to discharge.    Face to face encounter with patient: 35 minutes

## 2023-08-02 NOTE — PLAN OF CARE
[facility] - Telemetry (Hospital)  Initial Discharge Assessment       Primary Care Provider: Leana Troy MD    Admission Diagnosis: Non-ST elevation myocardial infarction (NSTEMI) [I21.4]  NSTEMI (non-ST elevation myocardial infarction) [I21.4]  NSTEMI (non-ST elevated myocardial infarction) [I21.4]  Chest pain [R07.9]    Admission Date: 8/1/2023  Expected Discharge Date:     Transition of Care Barriers: None    Payor: AETNA / Plan: AETNA CHOICE POS / Product Type: Commercial /     Extended Emergency Contact Information  Primary Emergency Contact: CHANDA YANCEY  Mobile Phone: [phone]  Relation: Spouse    Discharge Plan A: Home with family         [id number] - [place] - [address] AT [address] [facility] [address]  [address] [facility] [address]  [place] [address]  Phone: [phone] Fax: [phone]      Initial Assessment (most recent)       Adult Discharge Assessment - 08/02/23 1001          Discharge Assessment    Assessment Type Discharge Planning Assessment     Confirmed/corrected address, phone number and insurance Yes     Confirmed Demographics Correct on Facesheet     Source of Information patient     Communicated ASPEN with patient/caregiver Date not available/Unable to determine     Reason For Admission NSTEMI (non-ST elevated myocardial infarction)     People in Home child(cheyenne), dependent   Daughters ages 12, 7, and 6    Facility Arrived From: home     Do you expect to return to your current living situation? Yes     Do you have help at home or someone to help you manage your care at home? Yes     Who are your caregiver(s) and their phone number(s)? family/ friends     Prior to hospitilization cognitive status: Alert/Oriented     Current cognitive status: Alert/Oriented     Walking or Climbing Stairs --   independent    Dressing/Bathing --   independent    Home Accessibility wheelchair accessible     Equipment Currently Used at Home none     Readmission within 30 days? No      Patient currently being followed by outpatient case management? No     Do you currently have service(s) that help you manage your care at home? No     Do you take prescription medications? Yes     Do you have prescription coverage? Yes     Coverage Aet[employer] Commercial     Do you have any problems affording any of your prescribed medications? No     Is the patient taking medications as prescribed? yes     Who is going to help you get home at discharge? family/ friends     How do you get to doctors appointments? car, drives self     Are you on dialysis? No     Do you take coumadin? No     Discharge Plan A Home with family     DME Needed Upon Discharge  none     Discharge Plan discussed with: Patient     Transition of Care Barriers None                   Anticipated DC dispo: home with family  Prior Level of Function: independent with ADLs  People in home: Daughters, ages 12, 7, and 6.    Comments:  SW met with patient at bedside to introduce role and discuss discharge planning. Patient stated family and friends  will be help at home, if needed, and he can provide transport at time of discharge. CM discharge needs depends on hospital progress. SW will continue following to assist with other needs.

## 2023-08-02 NOTE — SUBJECTIVE & OBJECTIVE
Interval History: awake, alert, sitting up in bed, NAD. Denies any CP, SOB, or palpitations. Cardiology performed [facility] today, no acute findings. Started on colchicine and ASA for pericarditis. Will likely d/c in am if stable and ok with cardiology.     Review of Systems   Constitutional:  Negative for chills, fatigue and fever.   Respiratory:  Negative for cough, shortness of breath and wheezing.    Gastrointestinal:  Negative for abdominal pain, constipation, diarrhea and nausea.   Neurological:  Negative for dizziness and weakness.     Objective:     Vital Signs (Most Recent):  Temp: 97.8 °F (36.6 °C) (08/02/23 1428)  Pulse: 78 (08/02/23 1428)  Resp: 18 (08/02/23 1428)  BP: (!) 114/56 (08/02/23 1428)  SpO2: 100 % (08/02/23 1428) Vital Signs (24h Range):  Temp:  [97.7 °F (36.5 °C)-98.3 °F (36.8 °C)] 97.8 °F (36.6 °C)  Pulse:  [61-78] 78  Resp:  [0-28] 18  SpO2:  [96 %-100 %] 100 %  BP: ()/(56-90) 114/56     Weight: 107 kg (236 lb)  Body mass index is 27.27 kg/m².  No intake or output data in the 24 hours ending 08/02/23 1511      Physical Exam  Vitals and nursing note reviewed.   Constitutional:       Appearance: Normal appearance.   Cardiovascular:      Rate and Rhythm: Normal rate and regular rhythm.      Heart sounds: No murmur heard.     Comments: Telemetry monitoring  Pulmonary:      Effort: Pulmonary effort is normal.      Breath sounds: Normal breath sounds.   Abdominal:      General: Bowel sounds are normal.      Palpations: Abdomen is soft.   Musculoskeletal:         General: Normal range of motion.   Skin:     General: Skin is warm and dry.   Neurological:      General: No focal deficit present.      Mental Status: He is alert and oriented to person, place, and time.   Psychiatric:         Mood and Affect: Mood normal.         Behavior: Behavior normal.             Significant Labs: All pertinent labs within the past 24 hours have been reviewed.  CBC:   Recent Labs   Lab 08/01/23  1316 08/02/23  3765  08/02/23  0634   WBC 6.54 5.70 4.96   HGB 14.1 13.0* 13.4*   HCT 43.0 40.3 42.2    166 170     CMP:   Recent Labs   Lab 08/01/23 2126 08/02/23  0634    140   K 3.6 4.0    105   CO2 27 28   GLU 85 84   BUN 13 12   CREATININE 1.1 1.0   CALCIUM 9.5 9.2   PROT 7.6 6.9   ALBUMIN 4.1 3.8   BILITOT 0.7 0.8   ALKPHOS 61 59   AST 33 28   ALT 25 24   ANIONGAP 8 7*       Significant Imaging: I have reviewed all pertinent imaging results/findings within the past 24 hours.

## 2023-08-02 NOTE — ED PROVIDER NOTES
SCRIBE #1 NOTE: I, Chemo Juan, am scribing for, and in the presence of, Cee Sun MD. I have scribed the entire note.       History     Chief Complaint   Patient presents with    Chest Pain     Tingling to lt. arm  and chest discomfort this afternoon. Went [facility] had blood work drawn, left prior to getting results, was contacted and told his troponin was elevated. Patient denies pain at this time. Previous MI     Review of patient's allergies indicates:  No Known Allergies      History of Present Illness     HPI    8/1/2023, 9:11 PM  History obtained from the patient      History of Present Illness: Oumar Mccarthy is a 38 y.o. male patient with a PMHx of NSTEMI and HTN who presents to the Emergency Department for evaluation of left sided CP which onset gradually 9 hours ago. Pt states he had an episode of discomfort in his left chest and LUE after eating lunch today. He states he was referred to [facility] ED by his PCP but left early. He notes he was called and told his troponin was elevated. Symptoms are constant and moderate in severity. No mitigating or exacerbating factors reported. Patient denies any diaphoresis, n/v, SOB, cough, fever, abdominal pain, and all other sxs at this time. No further complaints or concerns at this time.       Arrival mode: Personal vehicle    PCP: Leana Troy MD        Past Medical History:  Past Medical History:   Diagnosis Date    Adult general medical examination 11/28/2016    Hyperlipidemia     Leukopenia     NSTEMI (non-ST elevated myocardial infarction)        Past Surgical History:  Past Surgical History:   Procedure Laterality Date    LEFT HEART CATHETERIZATION Left 5/24/2019    Procedure: CATHETERIZATION, HEART, LEFT;  Surgeon: Shaheen Rich MD;  Location: [facility] CATH LAB;  Service: Cardiology;  Laterality: Left;         Family History:  Family History   Problem Relation Age of Onset    Heart disease Paternal Grandfather     Hyperlipidemia Paternal Grandfather         Social History:  Social History     Tobacco Use    Smoking status: Never    Smokeless tobacco: Never   Substance and Sexual Activity    Alcohol use: Yes     Comment: social    Drug use: No    Sexual activity: Yes     Partners: Female        Review of Systems     Review of Systems   Constitutional:  Negative for fever.   HENT:  Negative for sore throat.    Respiratory:  Negative for shortness of breath.    Cardiovascular:  Positive for chest pain.   Gastrointestinal:  Negative for nausea.   Genitourinary:  Negative for dysuria.   Musculoskeletal:  Negative for back pain.   Skin:  Negative for rash.   Neurological:  Negative for weakness.   Hematological:  Does not bruise/bleed easily.   All other systems reviewed and are negative.       Physical Exam     Initial Vitals [08/01/23 2054]   BP Pulse Resp Temp SpO2   (!) 171/90 62 18 97.7 °F (36.5 °C) 100 %      MAP       --          Physical Exam   Nursing Notes and Vital Signs Reviewed.  Constitutional: Patient is in no acute distress. Well-developed and well-nourished.  Head: Atraumatic. Normocephalic.  Eyes: PERRL. EOM intact. Conjunctivae are not pale. No scleral icterus.  ENT: Mucous membranes are moist. Oropharynx is clear and symmetric.    Neck: Supple. Full ROM. No lymphadenopathy.  Cardiovascular: Regular rate. Regular rhythm. No murmurs, rubs, or gallops. Distal pulses are 2+ and symmetric.  Pulmonary/Chest: No respiratory distress. Clear to auscultation bilaterally. No wheezing or rales.  Abdominal: Soft and non-distended.  There is no tenderness.  No rebound, guarding, or rigidity. Good bowel sounds.  Genitourinary: No CVA tenderness  Musculoskeletal: Moves all extremities. No obvious deformities. No edema. No calf tenderness.  Skin: Warm and dry.  Neurological:  Alert, awake, and appropriate.  Normal speech.  No acute focal neurological deficits are appreciated.  Psychiatric: Normal affect. Good eye contact. Appropriate in content.     ED Course    Critical Care    Date/Time: 8/1/2023 10:05 PM    Performed by: Zheng Jordan Jr., MD  Authorized by: Zheng Jordan Jr., MD  Direct patient critical care time: 25 minutes  Additional history critical care time: 10 minutes  Ordering / reviewing critical care time: 10 minutes  Documentation critical care time: 5 minutes  Total critical care time (exclusive of procedural time) : 50 minutes  Critical care time was exclusive of separately billable procedures and treating other patients and teaching time.  Critical care was necessary to treat or prevent imminent or life-threatening deterioration of the following conditions: NSTEMI.  Critical care was time spent personally by me on the following activities: blood draw for specimens, development of treatment plan with patient or surrogate, interpretation of cardiac output measurements, evaluation of patient's response to treatment, examination of patient, obtaining history from patient or surrogate, ordering and performing treatments and interventions, ordering and review of laboratory studies, ordering and review of radiographic studies, pulse oximetry, re-evaluation of patient's condition and review of old charts.        ED Vital Signs:  Vitals:    08/01/23 2054 08/01/23 2107 08/01/23 2126 08/01/23 2127   BP: (!) 171/90 (!) 170/78 139/76 139/76   Pulse: 62 65 65 67   Resp: 18 20  (!) 28   Temp: 97.7 °F (36.5 °C)      TempSrc: Oral      SpO2: 100% 99%  100%   Weight: 108 kg (238 lb 1.6 oz)       08/01/23 2130 08/01/23 2133 08/01/23 2200 08/01/23 2243   BP:  133/78 134/76    Pulse: 66 63 64 62   Resp:  18 19    Temp:       TempSrc:       SpO2:  99% 98%    Weight:           Abnormal Lab Results:  Labs Reviewed   TROPONIN I - Abnormal; Notable for the following components:       Result Value    Troponin I 3.337 (*)     All other components within normal limits   CBC W/ AUTO DIFFERENTIAL   COMPREHENSIVE METABOLIC PANEL   B-TYPE NATRIURETIC PEPTIDE   PROTIME-INR   APTT         All Lab Results:  Results for orders placed or performed during the hospital encounter of 08/01/23   CBC auto differential   Result Value Ref Range    WBC 6.54 3.90 - 12.70 K/uL    RBC 5.03 4.60 - 6.20 M/uL    Hemoglobin 14.1 14.0 - 18.0 g/dL    Hematocrit 43.0 40.0 - 54.0 %    MCV 86 82 - 98 fL    MCH 28.0 27.0 - 31.0 pg    MCHC 32.8 32.0 - 36.0 g/dL    RDW 13.6 11.5 - 14.5 %    Platelets 183 150 - 450 K/uL    MPV 10.3 9.2 - 12.9 fL    Immature Granulocytes 0.5 0.0 - 0.5 %    Gran # (ANC) 3.5 1.8 - 7.7 K/uL    Immature Grans (Abs) 0.03 0.00 - 0.04 K/uL    Lymph # 2.5 1.0 - 4.8 K/uL    Mono # 0.4 0.3 - 1.0 K/uL    Eos # 0.1 0.0 - 0.5 K/uL    Baso # 0.03 0.00 - 0.20 K/uL    nRBC 0 0 /100 WBC    Gran % 53.3 38.0 - 73.0 %    Lymph % 37.9 18.0 - 48.0 %    Mono % 5.8 4.0 - 15.0 %    Eosinophil % 2.0 0.0 - 8.0 %    Basophil % 0.5 0.0 - 1.9 %    Differential Method Automated    Comprehensive metabolic panel   Result Value Ref Range    Sodium 140 136 - 145 mmol/L    Potassium 3.6 3.5 - 5.1 mmol/L    Chloride 105 95 - 110 mmol/L    CO2 27 23 - 29 mmol/L    Glucose 85 70 - 110 mg/dL    BUN 13 6 - 20 mg/dL    Creatinine 1.1 0.5 - 1.4 mg/dL    Calcium 9.5 8.7 - 10.5 mg/dL    Total Protein 7.6 6.0 - 8.4 g/dL    Albumin 4.1 3.5 - 5.2 g/dL    Total Bilirubin 0.7 0.1 - 1.0 mg/dL    Alkaline Phosphatase 61 55 - 135 U/L    AST 33 10 - 40 U/L    ALT 25 10 - 44 U/L    eGFR >60 >60 mL/min/1.73 m^2    Anion Gap 8 8 - 16 mmol/L   Troponin I #1   Result Value Ref Range    Troponin I 3.337 (H) 0.000 - 0.026 ng/mL   BNP   Result Value Ref Range    BNP 13 0 - 99 pg/mL         Imaging Results:  Imaging Results    None          The EKG was ordered, reviewed, and independently interpreted by the ED provider.  Interpretation time: 20:53  Rate: 61 BPM  Rhythm: normal sinus rhythm  Interpretation: Rightward axis. No STEMI.    The Emergency Provider reviewed the vital signs and test results, which are outlined above.     ED Discussion        10:23 PM: Discussed case with Yuniel rOnelas MD ([facility]). Dr. Ornelas agrees with current care and management of pt and accepts admission.   Admitting Service: Hospital Medicine  Admitting Physician: Dr. Ornelas  Admit to: Obs med tele    10:23 PM: Re-evaluated pt. I have discussed test results, shared treatment plan, and the need for admission with patient and family at bedside. Pt and family express understanding at this time and agree with all information. All questions answered. Pt and family have no further questions or concerns at this time. Pt is ready for admit.         Medical Decision Making:   History:   Old Medical Records: I decided to obtain old medical records.  Old Records Summarized: records from previous admission(s).       <> Summary of Records: I reviewed the records from [facility].  Patient left AMA however as result in the computer.  Had an elevated troponin.  I reviewed his prior troponins in his last was elevated however less than his troponin at our [facility] today.  Initial Assessment:   Patient presents complaining of chest pain earlier today that is since resolved.  Do not take an aspirin presented to our [facility] were blood work was obtained however he was not seen in the ED.  Due to the prolonged wait he left prior to being seen and they called with a positive troponin result.  His cardiologist is Dr. Anthony Delacruz with [facility].  Physical exam here is benign.  Differential Diagnosis:   ACS, NSTEMI, STEMI, chest pain, cardiac ischemia, pneumonia  Clinical Tests:   Lab Tests: Ordered and Reviewed  Medical Tests: Ordered and Reviewed  ED Management:  Patient was evaluated history and physical examination.  EKG was obtained showed normal sinus rhythm without STEMI.  Interpreted all laboratory studies.  Had normal BNP normal CMP normal CBC but a troponin that is 3.337.  This is higher than his troponin at our [facility] much higher than his  normal baseline.  In light of his prior chest pain and discussed the case with hospital Medicine has graciously accepted the patient for admission.  Treated with nitrates aspirin and beta-blockers in the ED.  He is stable.  I discussed all findings with the patient as well as plan of care.  Verbalized agreement understanding with all instructions and seems reliable.  Indication for critical care is NSTEMI diagnosis.           ED Medication(s):  Medications   aspirin tablet 325 mg (325 mg Oral Given 8/1/23 2126)   metoprolol tartrate (LOPRESSOR) tablet 50 mg (50 mg Oral Given 8/1/23 2126)   nitroGLYCERIN 2% TD oint ointment 1 inch (1 inch Topical (Top) Given 8/1/23 2127)       New Prescriptions    No medications on file               Scribe Attestation:   Scribe #1: I performed the above scribed service and the documentation accurately describes the services I performed. I attest to the accuracy of the note.     Attending:   Physician Attestation Statement for Scribe #1: I, Cee Sun MD, personally performed the services described in this documentation, as scribed by Chemo Juan, in my presence, and it is both accurate and complete.           Clinical Impression       ICD-10-CM ICD-9-CM   1. NSTEMI (non-ST elevated myocardial infarction)  I21.4 410.70   2. Chest pain  R07.9 786.50       Disposition:   Disposition: Placed in Observation  Condition: Paris Jordan Jr., MD  08/01/23 1876

## 2023-08-02 NOTE — ASSESSMENT & PLAN NOTE
Currently normotensive.   Plan:  -Optimize pain control   -Continue home medications, titrate as needed   -Monitor BP  -Low salt/cardiac diet when not NPO  -IV hydralazine prn for SBP>160 or DBP>90

## 2023-08-02 NOTE — INTERVAL H&P NOTE
The patient has been examined and the H&P has been reviewed:    I concur with the findings and no changes have occurred since H&P was written.    Anesthesia/Surgery risks, benefits and alternative options discussed and understood by patient/family.          Active Hospital Problems    Diagnosis  POA    *NSTEMI (non-ST elevated myocardial infarction) [I21.4]  Unknown    Hypertension [I10]  Yes    Hyperlipidemia [E78.5]  Yes     Chronic      Resolved Hospital Problems   No resolved problems to display.

## 2023-08-02 NOTE — ASSESSMENT & PLAN NOTE
Patient is not chronically on statin.will continue for now. Last Lipid Panel:   Lab Results   Component Value Date    CHOL 219 (H) 06/20/2022    HDL 41 06/20/2022    LDLCALC 158 (H) 06/20/2022    TRIG 113 06/20/2022    CHOLHDL 22.7 05/24/2019   Plan:  -Continue home medication  -low fat/low calorie diet

## 2023-08-02 NOTE — PLAN OF CARE
Problem: Adult Inpatient Plan of Care  Goal: Plan of Care Review  Outcome: Ongoing, Progressing     Problem: Cardiac-Related Pain (Acute Coronary Syndrome)  Goal: Absence of Cardiac-Related Pain  Outcome: Ongoing, Progressing

## 2023-08-02 NOTE — BRIEF OP NOTE
[facility] - Cath Lab ([facility])  Brief Operative Note  Cardiology    SUMMARY     Surgery Date: 8/2/2023     Surgeon(s) and Role:     * Laura Tellez MD - Primary    Assisting Surgeon: None    Pre-op Diagnosis:  NSTEMI (non-ST elevated myocardial infarction) [I21.4]    Post-op Diagnosis: Post-Op Diagnosis Codes:     * NSTEMI (non-ST elevated myocardial infarction) [I21.4]    Procedure Performed:     Procedure(s) (LRB):  Left heart cath (Left)  Ventriculogram, Left  ANGIOGRAM, CORONARY ARTERY (N/A)    Technical Procedures Used:     Operative Findings:   Results for orders placed during the hospital encounter of 08/01/23    Cardiac catheterization    Conclusion    The ejection fraction was calculated to be 65%.    The pre-procedure left ventricular end diastolic pressure was 22.    The post-procedure left ventricular end diastolic pressure was 29.    The estimated blood loss was none.    The coronary arteries were normal..    There was diastolic dysfunction.    The filling pressures on the left were mildly elevated.    The procedure log was documented by Documenter: Mike Deleon RN and verified by Laura Tellez MD.    Date: 8/2/2023  Time: 12:04 PM      Will treat for possible pericarditis       Estimated Blood Loss: * No values recorded between 8/2/2023 11:50 AM and 8/2/2023 12:03 PM *         Specimens:   Specimen (24h ago, onward)      None

## 2023-08-02 NOTE — PLAN OF CARE
A214/A214 SILVANO  Oumar Mccarthy is a 38 y.o.male admitted on 8/1/2023 for NSTEMI (non-ST elevated myocardial infarction)   Code Status: Full Code MRN: [id number]   Review of patient's allergies indicates:  No Known Allergies  Past Medical History:   Diagnosis Date    Adult general medical examination 11/28/2016    Hyperlipidemia     Leukopenia     NSTEMI (non-ST elevated myocardial infarction)       PRN meds    sodium chloride 0.9%, , Continuous PRN  acetaminophen, 650 mg, Q6H PRN  acetaminophen, 650 mg, Q8H PRN  aluminum-magnesium hydroxide-simethicone, 30 mL, QID PRN  glucagon (human recombinant), 1 mg, PRN  glucose, 16 g, PRN  glucose, 24 g, PRN  HYDROcodone-acetaminophen, 1 tablet, Q6H PRN  melatonin, 6 mg, Nightly PRN  morphine, 2 mg, Q4H PRN  naloxone, 0.02 mg, PRN  nitroGLYCERIN, 0.4 mg, Q5 Min PRN  ondansetron, 4 mg, Q8H PRN  promethazine, 25 mg, Q6H PRN  senna-docusate 8.6-50 mg, 1 tablet, BID PRN      Left heart cath procedure today, pt tolerated well. Left wrist immobilizer on. Heparin discontinued. Cardiac diet. Chart check completed. Will continue plan of care.               Lead Monitored: Lead II Rhythm: normal sinus rhythm    Cardiac/Telemetry Box Number: [id number]  VTE Required Core Measure: Pharmacological prophylaxis initiated/maintained Last Bowel Movement: 08/01/23  Diet Cardiac     Renaldo Score: 23  Fall Risk Score: 4  Accucheck []   Freq?      Lines/Drains/Airways       Peripheral Intravenous Line  Duration                  Peripheral IV - Single Lumen 08/01/23 2117 20 G Right Antecubital <1 day         Peripheral IV - Single Lumen 08/02/23 0030 20 G Anterior;Left;Proximal Forearm <1 day

## 2023-08-02 NOTE — H&P
[facility] - Emergency Dept.  [facility] Medicine  History & Physical    Patient Name: Oumar Mccarthy  MRN: [id number]  Patient Class: OP- Observation  Admission Date: 8/1/2023  Attending Physician: Cara Baez MD   Primary Care Provider: Leana Troy MD         Patient information was obtained from patient, past medical records and ER records.     Subjective:     Principal Problem:NSTEMI (non-ST elevated myocardial infarction)    Chief Complaint:   Chief Complaint   Patient presents with    Chest Pain     Tingling to lt. arm  and chest discomfort this afternoon. Went [facility] had blood work drawn, left prior to getting results, was contacted and told his troponin was elevated. Patient denies pain at this time. Previous MI        HPI: Oumar Mccarthy is a 38 y.o. male with a PMH  has a past medical history of Adult general medical examination (11/28/2016), Hyperlipidemia, Leukopenia, and NSTEMI (non-ST elevated myocardial infarction). who presented to the ED for further evaluation of left-sided chest pain which progressively worsened over the past 9 hours.  Patient was seen at [facility] ED for similar complaints but left AMA prior to be seen.  Patient was called and advised to report back to the ED due to elevated troponin and presented to [facility].  Patient reported endorsing left-sided chest pain radiating to his left upper extremity with associated tingling after eating lunch earlier today but currently denies endorsing any chest pain at time of admission.  He reported no known alleviating or aggravating factors noted and reported all other review of systems negative except as noted above.  Repeat workup in the ED revealed elevated troponin measuring 2.46 with repeat measuring 3.337 with negative evidence of ischemia noted on EKG.  Patient initiated on NSTEMI protocol and admitted to Hospital Medicine under observation for continued medical management while awaiting further evaluation by Cardiology.   Of note, patient underwent left heart catheterization back in 5/24/19 by Dr. iRch which revealed normal coronaries noted throughout.    PCP: Leana Troy        Past Medical History:   Diagnosis Date    Adult general medical examination 11/28/2016    Hyperlipidemia     Leukopenia     NSTEMI (non-ST elevated myocardial infarction)        Past Surgical History:   Procedure Laterality Date    LEFT HEART CATHETERIZATION Left 5/24/2019    Procedure: CATHETERIZATION, HEART, LEFT;  Surgeon: Shaheen Rich MD;  Location: [facility] CATH LAB;  Service: Cardiology;  Laterality: Left;       Review of patient's allergies indicates:  No Known Allergies    No current facility-administered medications on file prior to encounter.     Current Outpatient Medications on File Prior to Encounter   Medication Sig    atorvastatin (LIPITOR) 20 MG tablet Take 1 tablet (20 mg total) by mouth every evening.    cholecalciferol, vitamin D3, 1,250 mcg (50,000 unit) capsule Take 1 capsule (50,000 Units total) by mouth once a week.     Family History       Problem Relation (Age of Onset)    Heart disease Paternal Grandfather    Hyperlipidemia Paternal Grandfather          Tobacco Use    Smoking status: Never    Smokeless tobacco: Never   Substance and Sexual Activity    Alcohol use: Yes     Comment: social    Drug use: No    Sexual activity: Yes     Partners: Female     Review of Systems   All other systems reviewed and are negative.    Objective:     Vital Signs (Most Recent):  Temp: 97.7 °F (36.5 °C) (08/01/23 2054)  Pulse: 62 (08/01/23 2243)  Resp: 19 (08/01/23 2200)  BP: 134/76 (08/01/23 2200)  SpO2: 98 % (08/01/23 2200) Vital Signs (24h Range):  Temp:  [97.7 °F (36.5 °C)] 97.7 °F (36.5 °C)  Pulse:  [62-67] 62  Resp:  [18-28] 19  SpO2:  [98 %-100 %] 98 %  BP: (133-171)/(76-90) 134/76     Weight: 108 kg (238 lb 1.6 oz)  Body mass index is 30.55 kg/m².     Physical Exam  Vitals reviewed.   Constitutional:       General: He  is not in acute distress.     Appearance: Normal appearance. He is obese. He is not ill-appearing, toxic-appearing or diaphoretic.   HENT:      Head: Normocephalic and atraumatic.      Right Ear: External ear normal.      Left Ear: External ear normal.      Nose: Nose normal. No congestion or rhinorrhea.      Mouth/Throat:      Mouth: Mucous membranes are moist.      Pharynx: Oropharynx is clear. No oropharyngeal exudate or posterior oropharyngeal erythema.   Eyes:      General: No scleral icterus.     Extraocular Movements: Extraocular movements intact.      Conjunctiva/sclera: Conjunctivae normal.      Pupils: Pupils are equal, round, and reactive to light.   Neck:      Vascular: No carotid bruit.   Cardiovascular:      Rate and Rhythm: Normal rate and regular rhythm.      Pulses: Normal pulses.      Heart sounds: Normal heart sounds. No murmur heard.     No friction rub. No gallop.   Pulmonary:      Effort: Pulmonary effort is normal. No respiratory distress.      Breath sounds: Normal breath sounds. No stridor. No wheezing, rhonchi or rales.   Chest:      Chest wall: No tenderness.   Abdominal:      General: Abdomen is flat. Bowel sounds are normal. There is no distension.      Palpations: Abdomen is soft. There is no mass.      Tenderness: There is no abdominal tenderness. There is no guarding or rebound.      Hernia: No hernia is present.   Musculoskeletal:         General: No swelling, tenderness, deformity or signs of injury. Normal range of motion.      Cervical back: Normal range of motion and neck supple. No rigidity or tenderness.   Lymphadenopathy:      Cervical: No cervical adenopathy.   Skin:     General: Skin is warm and dry.      Capillary Refill: Capillary refill takes less than 2 seconds.      Coloration: Skin is not jaundiced or pale.      Findings: No bruising, erythema, lesion or rash.   Neurological:      General: No focal deficit present.      Mental Status: He is alert and oriented to  person, place, and time. Mental status is at baseline.      Cranial Nerves: No cranial nerve deficit.      Sensory: No sensory deficit.      Motor: No weakness.      Coordination: Coordination normal.   Psychiatric:         Mood and Affect: Mood normal.         Behavior: Behavior normal.         Thought Content: Thought content normal.         Judgment: Judgment normal.              CRANIAL NERVES     CN III, IV, VI   Pupils are equal, round, and reactive to light.       Significant Labs: All pertinent labs within the past 24 hours have been reviewed.    Significant Imaging: I have reviewed all pertinent imaging results/findings within the past 24 hours.    LABS:  Recent Results (from the past 24 hour(s))   TROPONIN    Collection Time: 08/01/23  5:38 PM   Result Value Ref Range    Troponin I 2.46 (H) 0.00 - 0.03 ng/mL   CBC auto differential    Collection Time: 08/01/23  9:26 PM   Result Value Ref Range    WBC 6.54 3.90 - 12.70 K/uL    RBC 5.03 4.60 - 6.20 M/uL    Hemoglobin 14.1 14.0 - 18.0 g/dL    Hematocrit 43.0 40.0 - 54.0 %    MCV 86 82 - 98 fL    MCH 28.0 27.0 - 31.0 pg    MCHC 32.8 32.0 - 36.0 g/dL    RDW 13.6 11.5 - 14.5 %    Platelets 183 150 - 450 K/uL    MPV 10.3 9.2 - 12.9 fL    Immature Granulocytes 0.5 0.0 - 0.5 %    Gran # (ANC) 3.5 1.8 - 7.7 K/uL    Immature Grans (Abs) 0.03 0.00 - 0.04 K/uL    Lymph # 2.5 1.0 - 4.8 K/uL    Mono # 0.4 0.3 - 1.0 K/uL    Eos # 0.1 0.0 - 0.5 K/uL    Baso # 0.03 0.00 - 0.20 K/uL    nRBC 0 0 /100 WBC    Gran % 53.3 38.0 - 73.0 %    Lymph % 37.9 18.0 - 48.0 %    Mono % 5.8 4.0 - 15.0 %    Eosinophil % 2.0 0.0 - 8.0 %    Basophil % 0.5 0.0 - 1.9 %    Differential Method Automated    Comprehensive metabolic panel    Collection Time: 08/01/23  9:26 PM   Result Value Ref Range    Sodium 140 136 - 145 mmol/L    Potassium 3.6 3.5 - 5.1 mmol/L    Chloride 105 95 - 110 mmol/L    CO2 27 23 - 29 mmol/L    Glucose 85 70 - 110 mg/dL    BUN 13 6 - 20 mg/dL    Creatinine 1.1 0.5 - 1.4  mg/dL    Calcium 9.5 8.7 - 10.5 mg/dL    Total Protein 7.6 6.0 - 8.4 g/dL    Albumin 4.1 3.5 - 5.2 g/dL    Total Bilirubin 0.7 0.1 - 1.0 mg/dL    Alkaline Phosphatase 61 55 - 135 U/L    AST 33 10 - 40 U/L    ALT 25 10 - 44 U/L    eGFR >60 >60 mL/min/1.73 m^2    Anion Gap 8 8 - 16 mmol/L   Troponin I #1    Collection Time: 08/01/23  9:26 PM   Result Value Ref Range    Troponin I 3.337 (H) 0.000 - 0.026 ng/mL   BNP    Collection Time: 08/01/23  9:26 PM   Result Value Ref Range    BNP 13 0 - 99 pg/mL       RADIOLOGY  X-Ray Chest 1 View    Result Date: 8/1/2023  XR CHEST 1 VIEW HISTORY:  chest pain,  other. COMPARISON: No prior study. Single frontal view of the chest.    1.  No cardiac decompensation, pleural fluid, consolidation, pneumothorax or pneumoperitoneum. 2.  The osseous and soft tissues show no acute finding.      EKG    MICROBIOLOGY    MDM      Assessment/Plan:     * NSTEMI (non-ST elevated myocardial infarction)  Patient presents with NSTEMI. Chest pain is currently controlled. ADOLFO score is 2. Patient is currently on NSTEMI Pathway.    EKG reviewed. Troponins reviewed and results noted-   Recent Labs   Lab 08/02/23  0039   TROPONINI 3.253*     Lipid panel reviewed and shows-     Lab Results   Component Value Date    LDLCALC 158 (H) 06/20/2022     Lab Results   Component Value Date    TRIG 113 06/20/2022     Medical management includes; Beta Blocker, Dual Anti-Platelet therapy, Anticoagulation and High Intensity Statin Echo has been performed. Latest ECHO results are as follows- No results found for this or any previous visit.    Consult for cardiac rehab is ordered. Patient counseled on lifestyle modifications- follow a low fat, low cholesterol diet, attempt to lose weight, reduce salt in diet and cooking, reduce exposure to stress, improve dietary compliance, continue current healthy lifestyle patterns and return for routine annual checkups. Cardiology is consulted. Plan of care pending with cardiology  team. Continue to monitor patient closely and adjust therapy as needed.      Hypertension  Currently normotensive.   Plan:  -Optimize pain control   -Continue home medications, titrate as needed   -Monitor BP  -Low salt/cardiac diet when not NPO  -IV hydralazine prn for SBP>160 or DBP>90       Hyperlipidemia  Patient is not chronically on statin.will continue for now. Last Lipid Panel:   Lab Results   Component Value Date    CHOL 219 (H) 06/20/2022    HDL 41 06/20/2022    LDLCALC 158 (H) 06/20/2022    TRIG 113 06/20/2022    CHOLHDL 22.7 05/24/2019   Plan:  -Continue home medication  -low fat/low calorie diet      VTE Risk Mitigation (From admission, onward)         Ordered     heparin 25,000 units in dextrose 5% (100 units/ml) IV bolus from bag - ADDITIONAL PRN BOLUS - 60 units/kg (max bolus 4000 units)  As needed (PRN)        Question:  Heparin Infusion Adjustment (DO NOT MODIFY ANSWER)  Answer:  \[email or web address]\epic\Images\Pharmacy\HeparinInfusions\heparin LOW INTENSITY nomogram for [facility] [id number].pdf    08/01/23 2249     heparin 25,000 units in dextrose 5% (100 units/ml) IV bolus from bag - ADDITIONAL PRN BOLUS - 30 units/kg (max bolus 4000 units)  As needed (PRN)        Question:  Heparin Infusion Adjustment (DO NOT MODIFY ANSWER)  Answer:  \\[email or web address]\epic\Images\Pharmacy\HeparinInfusions\heparin LOW INTENSITY nomogram for [facility] [id number].pdf    08/01/23 2249     heparin 25,000 units in dextrose 5% (100 units/ml) IV bolus from bag INITIAL BOLUS (max bolus 4000 units)  Once        Question:  Heparin Infusion Adjustment (DO NOT MODIFY ANSWER)  Answer:  \\[email or web address]\epic\Images\Pharmacy\HeparinInfusions\heparin LOW INTENSITY nomogram for [facility] [id number].pdf    08/01/23 2249     heparin 25,000 units in dextrose 5% 250 mL (100 units/mL) infusion LOW INTENSITY nomogram - [facility]  Continuous        Question Answer Comment   Heparin Infusion Adjustment (DO NOT MODIFY ANSWER) \\[email or web address]\epic\Images\Pharmacy\HeparinInfusions\heparin  LOW INTENSITY nomogram for [facility] [id number].pdf    Begin at (in units/kg/hr) 12        08/01/23 2249     IP VTE HIGH RISK PATIENT  Once         08/01/23 2248     Place sequential compression device  Until discontinued         08/01/23 2248     Reason for No Pharmacological VTE Prophylaxis  Once        Question:  Reasons:  Answer:  Physician Provided (leave comment)  Comment:  heparin    08/01/23 2248              //Core Measures   -DVT proph: SCDs, heparin drip    -Code status: Full    -Surrogate: none provided       Components of this note were documented using a voice recognition system and are subject to errors not corrected at the time the document was proof read. Please contact the author for any clarifications.        On 08/02/2023, patient should be placed in hospital observation services under my care.      Yuniel Ornelas MD  Department of Hospital Medicine  '[place] - Emergency Dept.

## 2023-08-02 NOTE — H&P (VIEW-ONLY)
[facility] - Telemetry ([facility])  Cardiology  Consult Note    Patient Name: Oumar Mccarthy  MRN: [id number]  Admission Date: 8/1/2023  Hospital Length of Stay: 0 days  Code Status: Full Code   Attending Provider: aCra Baez MD   Consulting Provider: Angeline Orourke PA-C  Primary Care Physician: Leana Troy MD  Principal Problem:NSTEMI (non-ST elevated myocardial infarction)    Patient information was obtained from patient, past medical records and ER records.     Inpatient consult to Cardiology  Consult performed by: Angeline Orourke PA-C  Consult ordered by: Yuniel Ornelas MD        Subjective:     Chief Complaint:  CP    HPI:   Mr. Mccarthy is a 38 year old male patient whose current medical conditions include hyperlipidemia, NSTEMI in 5/19 (s/p LHC that showed normal coronaries), pericarditis, and ? Coronary vasospasm who presented to [facility] ED yesterday evening due to left-sided chest tingling/discomfort that onset yesterday after eating lunch. Patient initially attributed his symptom to indigestion but became concerned when pain radiated down his left arm and persisted for several hours. He denied any associated SOB, nausea, vomiting, palpitations, near syncope, or syncope. He initially was evaluated at [facility] in  but left AMA prior to being seen. Initial workup in ED revealed troponin of 2.46>3.337 and patient was subsequently admitted for further evaluation and treatment. Cardiology consulted to assist with management. Patient seen and examined today,r resting in bed. Currently CP free. No other CV complaints. Non-smoker. No familial history of CAD. Followed by outside cardiologist, Dr. Hickman. States he is on no meds as OP. Troponin trending down. Echo pending. [facility] planned today by Dr. Tellez.          Past Medical History:   Diagnosis Date    Adult general medical examination 11/28/2016    Hyperlipidemia     Leukopenia     NSTEMI (non-ST elevated myocardial infarction)        Past  Surgical History:   Procedure Laterality Date    LEFT HEART CATHETERIZATION Left 5/24/2019    Procedure: CATHETERIZATION, HEART, LEFT;  Surgeon: Shaheen Rich MD;  Location: [facility] CATH LAB;  Service: Cardiology;  Laterality: Left;       Review of patient's allergies indicates:  No Known Allergies    No current facility-administered medications on file prior to encounter.     Current Outpatient Medications on File Prior to Encounter   Medication Sig    atorvastatin (LIPITOR) 20 MG tablet Take 1 tablet (20 mg total) by mouth every evening.    cholecalciferol, vitamin D3, 1,250 mcg (50,000 unit) capsule Take 1 capsule (50,000 Units total) by mouth once a week.     Family History       Problem Relation (Age of Onset)    Heart disease Paternal Grandfather    Hyperlipidemia Paternal Grandfather          Tobacco Use    Smoking status: Never    Smokeless tobacco: Never   Substance and Sexual Activity    Alcohol use: Yes     Comment: social    Drug use: No    Sexual activity: Yes     Partners: Female     Review of Systems   Constitutional: Negative.   HENT: Negative.     Eyes: Negative.    Cardiovascular:  Positive for chest pain (resolved).   Respiratory: Negative.     Endocrine: Negative.    Hematologic/Lymphatic: Negative.    Skin: Negative.    Musculoskeletal:  Positive for joint pain (L arm pain).   Gastrointestinal: Negative.    Genitourinary: Negative.    Neurological: Negative.    Psychiatric/Behavioral: Negative.     Allergic/Immunologic: Negative.      Objective:     Vital Signs (Most Recent):  Temp: 98.3 °F (36.8 °C) (08/02/23 0732)  Pulse: 64 (08/02/23 0732)  Resp: 16 (08/02/23 0732)  BP: (!) 97/57 (08/02/23 0732)  SpO2: 99 % (08/02/23 0732) Vital Signs (24h Range):  Temp:  [97.7 °F (36.5 °C)-98.3 °F (36.8 °C)] 98.3 °F (36.8 °C)  Pulse:  [62-74] 64  Resp:  [16-28] 16  SpO2:  [96 %-100 %] 99 %  BP: ()/(57-90) 97/57     Weight: 107 kg (236 lb)  Body mass index is 27.27 kg/m².    SpO2: 99 %        No intake or output data in the 24 hours ending 08/02/23 1014    Lines/Drains/Airways       Peripheral Intravenous Line  Duration                  Peripheral IV - Single Lumen 08/01/23 2117 20 G Right Antecubital <1 day         Peripheral IV - Single Lumen 08/02/23 0030 20 G Anterior;Left;Proximal Forearm <1 day                     Physical Exam  Vitals and nursing note reviewed.   Constitutional:       General: He is not in acute distress.     Appearance: Normal appearance. He is well-developed. He is not diaphoretic.   HENT:      Head: Normocephalic and atraumatic.   Eyes:      General:         Right eye: No discharge.         Left eye: No discharge.      Pupils: Pupils are equal, round, and reactive to light.   Neck:      Thyroid: No thyromegaly.      Vascular: No JVD.      Trachea: No tracheal deviation.   Cardiovascular:      Rate and Rhythm: Normal rate and regular rhythm.      Heart sounds: Normal heart sounds, S1 normal and S2 normal. No murmur heard.  Pulmonary:      Effort: Pulmonary effort is normal. No respiratory distress.      Breath sounds: Normal breath sounds. No wheezing or rales.   Abdominal:      General: There is no distension.      Palpations: Abdomen is soft.      Tenderness: There is no rebound.   Musculoskeletal:      Cervical back: Neck supple.      Right lower leg: No edema.      Left lower leg: No edema.   Skin:     General: Skin is warm and dry.      Findings: No erythema.   Neurological:      General: No focal deficit present.      Mental Status: He is alert and oriented to person, place, and time.   Psychiatric:         Mood and Affect: Mood normal.         Behavior: Behavior normal.         Thought Content: Thought content normal.          Significant Labs: CMP   Recent Labs   Lab 08/01/23  2126 08/02/23  0634    140   K 3.6 4.0    105   CO2 27 28   GLU 85 84   BUN 13 12   CREATININE 1.1 1.0   CALCIUM 9.5 9.2   PROT 7.6 6.9   ALBUMIN 4.1 3.8   BILITOT 0.7 0.8   ALKPHOS  61 59   AST 33 28   ALT 25 24   ANIONGAP 8 7*   , CBC   Recent Labs   Lab 08/01/23  2126 08/02/23  0423 08/02/23  0634   WBC 6.54 5.70 4.96   HGB 14.1 13.0* 13.4*   HCT 43.0 40.3 42.2    166 170   , Troponin   Recent Labs   Lab 08/02/23  0039 08/02/23  0423 08/02/23  0634   TROPONINI 3.253* 2.279* 2.049*   , and All pertinent lab results from the last 24 hours have been reviewed.    Significant Imaging: Echocardiogram: Transthoracic echo (TTE) complete ([facility] Only):   Results for orders placed or performed during the hospital encounter of 08/01/23   Echo   Result Value Ref Range    BSA 2.43 m2    LVOT stroke volume 64.65 cm3    LVIDd 4.61 3.5 - 6.0 cm    LV Systolic Volume 43.88 mL    LV Systolic Volume Index 18.1 mL/m2    LVIDs 3.29 2.1 - 4.0 cm    LV Diastolic Volume 97.60 mL    LV Diastolic Volume Index 40.33 mL/m2    IVS 1.16 (A) 0.6 - 1.1 cm    LVOT diameter 2.32 cm    LVOT area 4.2 cm2    FS 29 28 - 44 %    Left Ventricle Relative Wall Thickness 0.59 cm    Posterior Wall 1.37 (A) 0.6 - 1.1 cm    LV mass 221.93 g    LV Mass Index 92 g/m2    MV Peak E Ej 0.81 m/s    TDI LATERAL 0.11 m/s    TDI SEPTAL 0.14 m/s    E/E' ratio 6.48 m/s    MV Peak A Ej 0.44 m/s    TR Max Ej 1.80 m/s    E/A ratio 1.84     IVRT 76.12 msec    E wave deceleration time 160.43 msec    LV SEPTAL E/E' RATIO 5.79 m/s    LV LATERAL E/E' RATIO 7.36 m/s    PV Peak S Ej 0.49 m/s    PV Peak D Ej 0.38 m/s    Pulm vein S/D ratio 1.29     LVOT peak ej 0.83 m/s    Left Ventricular Outflow Tract Mean Velocity 0.52 cm/s    Left Ventricular Outflow Tract Mean Gradient 1.30 mmHg    LA size 3.03 cm    Left Atrium Major Axis 5.68 cm    Left Atrium Minor Axis 4.30 cm    RV mid diameter 3.25 cm    RVOT peak VTI 11.0 cm    RA Major Axis 5.92 cm    AV mean gradient 2 mmHg    AV peak gradient 4 mmHg    Ao peak ej 0.94 m/s    Ao VTI 15.20 cm    LVOT peak VTI 15.30 cm    AV valve area 4.25 cm²    AV Velocity Ratio 0.88     AV index (prosthetic) 1.01      ETHAN by Velocity Ratio 3.73 cm²    Triscuspid Valve Regurgitation Peak Gradient 13 mmHg    PV mean gradient 1 mmHg    PV peak gradient 2     RVOT peak demi 0.66 m/s    Ao root annulus 3.06 cm    STJ 2.86 cm    Ascending aorta 3.07 cm    IVC diameter 1.46 cm    Mean e' 0.13 m/s    ZLVIDS -5.78     ZLVIDD -9.09     LA Volume Index 22.4 mL/m2    LA volume 54.21 cm3    LA WIDTH 4.3 cm    TAPSE 2.80 cm   , EKG: Reviewed, and X-Ray: CXR: X-Ray Chest 1 View (CXR): No results found for this visit on 08/01/23. and X-Ray Chest PA and Lateral (CXR): No results found for this visit on 08/01/23.    Assessment and Plan:   Patient who presents with CP/NSTEMI. Stable during exam. Continue same mgmt. Check echo. LHC today. Further rec's to follow.    * NSTEMI (non-ST elevated myocardial infarction)  -Presents with CP with radiation to L arm and elevated troponin  -Stable during exam, CP free  -Troponin trending down  -Continue ASA, statin, heparin gtt  -Echo pending  -Prior LHC in 2019 showed normal coronaries, patient also with history of pericarditis  -LHC planned today by Dr. Tellez. All risks, benefits, and treatment alternatives explained to patient in detail. All questions answered. He has agreed to proceed. Further rec's to follow.    Hypertension  -Not on meds as OP  -Monitor BP trend    Hyperlipidemia  -Statin        VTE Risk Mitigation (From admission, onward)         Ordered     heparin 25,000 units in dextrose 5% (100 units/ml) IV bolus from bag - ADDITIONAL PRN BOLUS - 60 units/kg (max bolus 4000 units)  As needed (PRN)        Question:  Heparin Infusion Adjustment (DO NOT MODIFY ANSWER)  Answer:  [email or web address]\epic\Images\Pharmacy\HeparinInfusions\heparin LOW INTENSITY nomogram for [facility] [id number].pdf    08/01/23 2242     heparin 25,000 units in dextrose 5% (100 units/ml) IV bolus from bag - ADDITIONAL PRN BOLUS - 30 units/kg (max bolus 4000 units)  As needed (PRN)        Question:  Heparin Infusion Adjustment (DO NOT  MODIFY ANSWER)  Answer:  \\[email or web address]\epic\Images\Pharmacy\HeparinInfusions\heparin LOW INTENSITY nomogram for [facility] [id number].pdf    08/01/23 2249     heparin 25,000 units in dextrose 5% (100 units/ml) IV bolus from bag INITIAL BOLUS (max bolus 4000 units)  Once        Question:  Heparin Infusion Adjustment (DO NOT MODIFY ANSWER)  Answer:  \\[email or web address]\epic\Images\Pharmacy\HeparinInfusions\heparin LOW INTENSITY nomogram for [facility] [id number].pdf    08/01/23 2249     heparin 25,000 units in dextrose 5% 250 mL (100 units/mL) infusion LOW INTENSITY nomogram - [facility]  Continuous        Question Answer Comment   Heparin Infusion Adjustment (DO NOT MODIFY ANSWER) \\[email or web address]\epic\Images\Pharmacy\HeparinInfusions\heparin LOW INTENSITY nomogram for [facility] [id number].pdf    Begin at (in units/kg/hr) 12        08/01/23 2249     IP VTE HIGH RISK PATIENT  Once         08/01/23 2248     Place sequential compression device  Until discontinued         08/01/23 2248     Reason for No Pharmacological VTE Prophylaxis  Once        Question:  Reasons:  Answer:  Physician Provided (leave comment)  Comment:  heparin    08/01/23 2248                Thank you for your consult. I will follow-up with patient. Please contact us if you have any additional questions.    Angeline Orourke PA-C  Cardiology   [facility] - Telemetry ([facility])

## 2023-08-02 NOTE — FIRST PROVIDER EVALUATION
Medical screening examination initiated.  I have conducted a focused provider triage encounter, findings are as follows:    Brief history of present illness:  Patient reports left-sided chest pain.  Patient reports elevated troponin    Vitals:    08/01/23 2054   BP: (!) 171/90   Pulse: 62   Resp: 18   Temp: 97.7 °F (36.5 °C)   TempSrc: Oral   SpO2: 100%   Weight: 108 kg (238 lb 1.6 oz)       Pertinent physical exam:  No acute distress    Brief workup plan:  Labs, EKG, imaging, further eval    Preliminary workup initiated; this workup will be continued and followed by the physician or advanced practice provider that is assigned to the patient when roomed.

## 2023-08-02 NOTE — HPI
Oumar Mccarthy is a 38 y.o. male with a PMH  has a past medical history of Adult general medical examination (11/28/2016), Hyperlipidemia, Leukopenia, and NSTEMI (non-ST elevated myocardial infarction). who presented to the ED for further evaluation of left-sided chest pain which progressively worsened over the past 9 hours.  Patient was seen at [facility] ED for similar complaints but left AMA prior to be seen.  Patient was called and advised to report back to the ED due to elevated troponin and presented to [facility].  Patient reported endorsing left-sided chest pain radiating to his left upper extremity with associated tingling after eating lunch earlier today but currently denies endorsing any chest pain at time of admission.  He reported no known alleviating or aggravating factors noted and reported all other review of systems negative except as noted above.  Repeat workup in the ED revealed elevated troponin measuring 2.46 with repeat measuring 3.337 with negative evidence of ischemia noted on EKG.  Patient initiated on NSTEMI protocol and admitted to Hospital Medicine under observation for continued medical management while awaiting further evaluation by Cardiology.  Of note, patient underwent left heart catheterization back in 5/24/19 by Dr. Rich which revealed normal coronaries noted throughout.    PCP: Leana Troy

## 2023-08-02 NOTE — ASSESSMENT & PLAN NOTE
Patient presents with NSTEMI. Chest pain is currently controlled. ADOLFO score is 2. Patient is currently on NSTEMI Pathway.    EKG reviewed. Troponins reviewed and results noted-   Recent Labs   Lab 08/02/23  0634   TROPONINI 2.049*     Lipid panel reviewed and shows-     Lab Results   Component Value Date    LDLCALC 141.8 08/01/2023     Lab Results   Component Value Date    TRIG 156 (H) 08/01/2023     Medical management includes; Beta Blocker, Dual Anti-Platelet therapy, Anticoagulation and High Intensity Statin Echo has been performed. Latest ECHO results are as follows- No results found for this or any previous visit.    -Consult for cardiac rehab is ordered  -Patient counseled on low cholesterol diet, attempt to lose weight, reduce salt in diet and cooking, reduce exposure to stress, improve dietary compliance, continue current healthy lifestyle patterns and return for routine annual checkups.   -Cardiology consulted, appreciate assistance  -LHC done today, no CAD noted

## 2023-08-02 NOTE — CONSULTS
[facility] ([facility])  Cardiology  Consult Note    Patient Name: Oumar Mccarthy  MRN: [id number]  Admission Date: 8/1/2023  Hospital Length of Stay: 0 days  Code Status: Full Code   Attending Provider: Cara Baez MD   Consulting Provider: Angeline Orourke PA-C  Primary Care Physician: Leana Troy MD  Principal Problem:NSTEMI (non-ST elevated myocardial infarction)    Patient information was obtained from patient, past medical records and ER records.     Inpatient consult to Cardiology  Consult performed by: Angeline Orourke PA-C  Consult ordered by: Yuniel Ornelas MD        Subjective:     Chief Complaint:  CP    HPI:   Mr. Mccarthy is a 38 year old male patient whose current medical conditions include hyperlipidemia, NSTEMI in 5/19 (s/p LHC that showed normal coronaries), pericarditis, and ? Coronary vasospasm who presented to [facility] ED yesterday evening due to left-sided chest tingling/discomfort that onset yesterday after eating lunch. Patient initially attributed his symptom to indigestion but became concerned when pain radiated down his left arm and persisted for several hours. He denied any associated SOB, nausea, vomiting, palpitations, near syncope, or syncope. He initially was evaluated at [facility] in  but left AMA prior to being seen. Initial workup in ED revealed troponin of 2.46>3.337 and patient was subsequently admitted for further evaluation and treatment. Cardiology consulted to assist with management. Patient seen and examined today,r resting in bed. Currently CP free. No other CV complaints. Non-smoker. No familial history of CAD. Followed by outside cardiologist, Dr. Hickman. States he is on no meds as OP. Troponin trending down. Echo pending. [facility] planned today by Dr. Tellez.          Past Medical History:   Diagnosis Date    Adult general medical examination 11/28/2016    Hyperlipidemia     Leukopenia     NSTEMI (non-ST elevated myocardial infarction)        Past  Surgical History:   Procedure Laterality Date    LEFT HEART CATHETERIZATION Left 5/24/2019    Procedure: CATHETERIZATION, HEART, LEFT;  Surgeon: Shaheen Rich MD;  Location: [facility] CATH LAB;  Service: Cardiology;  Laterality: Left;       Review of patient's allergies indicates:  No Known Allergies    No current facility-administered medications on file prior to encounter.     Current Outpatient Medications on File Prior to Encounter   Medication Sig    atorvastatin (LIPITOR) 20 MG tablet Take 1 tablet (20 mg total) by mouth every evening.    cholecalciferol, vitamin D3, 1,250 mcg (50,000 unit) capsule Take 1 capsule (50,000 Units total) by mouth once a week.     Family History       Problem Relation (Age of Onset)    Heart disease Paternal Grandfather    Hyperlipidemia Paternal Grandfather          Tobacco Use    Smoking status: Never    Smokeless tobacco: Never   Substance and Sexual Activity    Alcohol use: Yes     Comment: social    Drug use: No    Sexual activity: Yes     Partners: Female     Review of Systems   Constitutional: Negative.   HENT: Negative.     Eyes: Negative.    Cardiovascular:  Positive for chest pain (resolved).   Respiratory: Negative.     Endocrine: Negative.    Hematologic/Lymphatic: Negative.    Skin: Negative.    Musculoskeletal:  Positive for joint pain (L arm pain).   Gastrointestinal: Negative.    Genitourinary: Negative.    Neurological: Negative.    Psychiatric/Behavioral: Negative.     Allergic/Immunologic: Negative.      Objective:     Vital Signs (Most Recent):  Temp: 98.3 °F (36.8 °C) (08/02/23 0732)  Pulse: 64 (08/02/23 0732)  Resp: 16 (08/02/23 0732)  BP: (!) 97/57 (08/02/23 0732)  SpO2: 99 % (08/02/23 0732) Vital Signs (24h Range):  Temp:  [97.7 °F (36.5 °C)-98.3 °F (36.8 °C)] 98.3 °F (36.8 °C)  Pulse:  [62-74] 64  Resp:  [16-28] 16  SpO2:  [96 %-100 %] 99 %  BP: ()/(57-90) 97/57     Weight: 107 kg (236 lb)  Body mass index is 27.27 kg/m².    SpO2: 99 %        No intake or output data in the 24 hours ending 08/02/23 1014    Lines/Drains/Airways       Peripheral Intravenous Line  Duration                  Peripheral IV - Single Lumen 08/01/23 2117 20 G Right Antecubital <1 day         Peripheral IV - Single Lumen 08/02/23 0030 20 G Anterior;Left;Proximal Forearm <1 day                     Physical Exam  Vitals and nursing note reviewed.   Constitutional:       General: He is not in acute distress.     Appearance: Normal appearance. He is well-developed. He is not diaphoretic.   HENT:      Head: Normocephalic and atraumatic.   Eyes:      General:         Right eye: No discharge.         Left eye: No discharge.      Pupils: Pupils are equal, round, and reactive to light.   Neck:      Thyroid: No thyromegaly.      Vascular: No JVD.      Trachea: No tracheal deviation.   Cardiovascular:      Rate and Rhythm: Normal rate and regular rhythm.      Heart sounds: Normal heart sounds, S1 normal and S2 normal. No murmur heard.  Pulmonary:      Effort: Pulmonary effort is normal. No respiratory distress.      Breath sounds: Normal breath sounds. No wheezing or rales.   Abdominal:      General: There is no distension.      Palpations: Abdomen is soft.      Tenderness: There is no rebound.   Musculoskeletal:      Cervical back: Neck supple.      Right lower leg: No edema.      Left lower leg: No edema.   Skin:     General: Skin is warm and dry.      Findings: No erythema.   Neurological:      General: No focal deficit present.      Mental Status: He is alert and oriented to person, place, and time.   Psychiatric:         Mood and Affect: Mood normal.         Behavior: Behavior normal.         Thought Content: Thought content normal.          Significant Labs: CMP   Recent Labs   Lab 08/01/23  2126 08/02/23  0634    140   K 3.6 4.0    105   CO2 27 28   GLU 85 84   BUN 13 12   CREATININE 1.1 1.0   CALCIUM 9.5 9.2   PROT 7.6 6.9   ALBUMIN 4.1 3.8   BILITOT 0.7 0.8   ALKPHOS  61 59   AST 33 28   ALT 25 24   ANIONGAP 8 7*   , CBC   Recent Labs   Lab 08/01/23  2126 08/02/23  0423 08/02/23  0634   WBC 6.54 5.70 4.96   HGB 14.1 13.0* 13.4*   HCT 43.0 40.3 42.2    166 170   , Troponin   Recent Labs   Lab 08/02/23  0039 08/02/23  0423 08/02/23  0634   TROPONINI 3.253* 2.279* 2.049*   , and All pertinent lab results from the last 24 hours have been reviewed.    Significant Imaging: Echocardiogram: Transthoracic echo (TTE) complete (Cupid Only):   Results for orders placed or performed during the hospital encounter of 08/01/23   Echo   Result Value Ref Range    BSA 2.43 m2    LVOT stroke volume 64.65 cm3    LVIDd 4.61 3.5 - 6.0 cm    LV Systolic Volume 43.88 mL    LV Systolic Volume Index 18.1 mL/m2    LVIDs 3.29 2.1 - 4.0 cm    LV Diastolic Volume 97.60 mL    LV Diastolic Volume Index 40.33 mL/m2    IVS 1.16 (A) 0.6 - 1.1 cm    LVOT diameter 2.32 cm    LVOT area 4.2 cm2    FS 29 28 - 44 %    Left Ventricle Relative Wall Thickness 0.59 cm    Posterior Wall 1.37 (A) 0.6 - 1.1 cm    LV mass 221.93 g    LV Mass Index 92 g/m2    MV Peak E Ej 0.81 m/s    TDI LATERAL 0.11 m/s    TDI SEPTAL 0.14 m/s    E/E' ratio 6.48 m/s    MV Peak A Ej 0.44 m/s    TR Max Ej 1.80 m/s    E/A ratio 1.84     IVRT 76.12 msec    E wave deceleration time 160.43 msec    LV SEPTAL E/E' RATIO 5.79 m/s    LV LATERAL E/E' RATIO 7.36 m/s    PV Peak S Ej 0.49 m/s    PV Peak D Ej 0.38 m/s    Pulm vein S/D ratio 1.29     LVOT peak ej 0.83 m/s    Left Ventricular Outflow Tract Mean Velocity 0.52 cm/s    Left Ventricular Outflow Tract Mean Gradient 1.30 mmHg    LA size 3.03 cm    Left Atrium Major Axis 5.68 cm    Left Atrium Minor Axis 4.30 cm    RV mid diameter 3.25 cm    RVOT peak VTI 11.0 cm    RA Major Axis 5.92 cm    AV mean gradient 2 mmHg    AV peak gradient 4 mmHg    Ao peak ej 0.94 m/s    Ao VTI 15.20 cm    LVOT peak VTI 15.30 cm    AV valve area 4.25 cm²    AV Velocity Ratio 0.88     AV index (prosthetic) 1.01      ETHAN by Velocity Ratio 3.73 cm²    Triscuspid Valve Regurgitation Peak Gradient 13 mmHg    PV mean gradient 1 mmHg    PV peak gradient 2     RVOT peak demi 0.66 m/s    Ao root annulus 3.06 cm    STJ 2.86 cm    Ascending aorta 3.07 cm    IVC diameter 1.46 cm    Mean e' 0.13 m/s    ZLVIDS -5.78     ZLVIDD -9.09     LA Volume Index 22.4 mL/m2    LA volume 54.21 cm3    LA WIDTH 4.3 cm    TAPSE 2.80 cm   , EKG: Reviewed, and X-Ray: CXR: X-Ray Chest 1 View (CXR): No results found for this visit on 08/01/23. and X-Ray Chest PA and Lateral (CXR): No results found for this visit on 08/01/23.    Assessment and Plan:   Patient who presents with CP/NSTEMI. Stable during exam. Continue same mgmt. Check echo. LHC today. Further rec's to follow.    * NSTEMI (non-ST elevated myocardial infarction)  -Presents with CP with radiation to L arm and elevated troponin  -Stable during exam, CP free  -Troponin trending down  -Continue ASA, statin, heparin gtt  -Echo pending  -Prior LHC in 2019 showed normal coronaries, patient also with history of pericarditis  -LHC planned today by Dr. Tellez. All risks, benefits, and treatment alternatives explained to patient in detail. All questions answered. He has agreed to proceed. Further rec's to follow.    Hypertension  -Not on meds as OP  -Monitor BP trend    Hyperlipidemia  -Statin        VTE Risk Mitigation (From admission, onward)         Ordered     heparin 25,000 units in dextrose 5% (100 units/ml) IV bolus from bag - ADDITIONAL PRN BOLUS - 60 units/kg (max bolus 4000 units)  As needed (PRN)        Question:  Heparin Infusion Adjustment (DO NOT MODIFY ANSWER)  Answer:  [email or web address]\epic\Images\Pharmacy\HeparinInfusions\heparin LOW INTENSITY nomogram for [facility] [id number].pdf    08/01/23 2243     heparin 25,000 units in dextrose 5% (100 units/ml) IV bolus from bag - ADDITIONAL PRN BOLUS - 30 units/kg (max bolus 4000 units)  As needed (PRN)        Question:  Heparin Infusion Adjustment (DO NOT  MODIFY ANSWER)  Answer:  [email or web address]\epic\Images\Pharmacy\HeparinInfusions\heparin LOW INTENSITY nomogram for [facility] [id number].pdf    08/01/23 2249     heparin 25,000 units in dextrose 5% (100 units/ml) IV bolus from bag INITIAL BOLUS (max bolus 4000 units)  Once        Question:  Heparin Infusion Adjustment (DO NOT MODIFY ANSWER)  Answer:  \\[email or web address]\epic\Images\Pharmacy\HeparinInfusions\heparin LOW INTENSITY nomogram for [facility] [id number].pdf    08/01/23 2249     heparin 25,000 units in dextrose 5% 250 mL (100 units/mL) infusion LOW INTENSITY nomogram - [facility]  Continuous        Question Answer Comment   Heparin Infusion Adjustment (DO NOT MODIFY ANSWER) [email or web address]\epic\Images\Pharmacy\HeparinInfusions\heparin LOW INTENSITY nomogram for [facility] [id number].pdf    Begin at (in units/kg/hr) 12        08/01/23 2249     IP VTE HIGH RISK PATIENT  Once         08/01/23 2248     Place sequential compression device  Until discontinued         08/01/23 2248     Reason for No Pharmacological VTE Prophylaxis  Once        Question:  Reasons:  Answer:  Physician Provided (leave comment)  Comment:  heparin    08/01/23 2248                Thank you for your consult. I will follow-up with patient. Please contact us if you have any additional questions.    Angeline Orourke PA-C  Cardiology   [facility] - Telemetry ([facility])

## 2023-08-02 NOTE — ASSESSMENT & PLAN NOTE
-LHC negative for CAD or blockages  -cardiology wants to treat for possible pericarditis  -started on ASA and colchicine BID today  -monitor

## 2023-08-02 NOTE — PROGRESS NOTES
[facility] ER Medicine  Progress Note    Patient Name: Oumar Mccarthy  MRN: [id number]  Patient Class: OP- Observation   Admission Date: 8/1/2023  Length of Stay: 0 days  Attending Physician: Cara Baez MD  Primary Care Provider: Leana Troy MD        Subjective:     Principal Problem:NSTEMI (non-ST elevated myocardial infarction)      HPI:  Oumar Mccarthy is a 38 y.o. male with a PMH  has a past medical history of Adult general medical examination (11/28/2016), Hyperlipidemia, Leukopenia, and NSTEMI (non-ST elevated myocardial infarction). who presented to the ED for further evaluation of left-sided chest pain which progressively worsened over the past 9 hours.  Patient was seen at [facility] ED for similar complaints but left AMA prior to be seen.  Patient was called and advised to report back to the ED due to elevated troponin and presented to [facility].  Patient reported endorsing left-sided chest pain radiating to his left upper extremity with associated tingling after eating lunch earlier today but currently denies endorsing any chest pain at time of admission.  He reported no known alleviating or aggravating factors noted and reported all other review of systems negative except as noted above.  Repeat workup in the ED revealed elevated troponin measuring 2.46 with repeat measuring 3.337 with negative evidence of ischemia noted on EKG.  Patient initiated on NSTEMI protocol and admitted to Hospital Medicine under observation for continued medical management while awaiting further evaluation by Cardiology.  Of note, patient underwent left heart catheterization back in 5/24/19 by Dr. Rich which revealed normal coronaries noted throughout.    PCP: Leana Troy        Overview/Hospital Course:  37 y/o male admitted with c/o left sided chest pain that radiated to his left arm and tingling that happened while eating lunch the day of admission. He went to [facility]  first and the wait time was too long and he left. They called him back because his troponin was elevated and told him to go the ER and he came to [facility]. He reports his pain and tingling has resolved since admission. He has a h/o pericarditis several years ago.   Cardiology consulted, performed LHC on 8/2/23, negative for CAD, EF 65%, diastolic dysfunction and filling pressure on the left mildly elevated. Continue to monitor with telemetry monitoring overnight. Will treat for possible pericarditis, started on colchicine and aspirin BID.       Interval History: awake, alert, sitting up in bed, NAD. Denies any CP, SOB, or palpitations. Cardiology performed LHC today, no acute findings. Started on colchicine and ASA for pericarditis. Will likely d/c in am if stable and ok with cardiology.     Review of Systems   Constitutional:  Negative for chills, fatigue and fever.   Respiratory:  Negative for cough, shortness of breath and wheezing.    Gastrointestinal:  Negative for abdominal pain, constipation, diarrhea and nausea.   Neurological:  Negative for dizziness and weakness.     Objective:     Vital Signs (Most Recent):  Temp: 97.8 °F (36.6 °C) (08/02/23 1428)  Pulse: 78 (08/02/23 1428)  Resp: 18 (08/02/23 1428)  BP: (!) 114/56 (08/02/23 1428)  SpO2: 100 % (08/02/23 1428) Vital Signs (24h Range):  Temp:  [97.7 °F (36.5 °C)-98.3 °F (36.8 °C)] 97.8 °F (36.6 °C)  Pulse:  [61-78] 78  Resp:  [0-28] 18  SpO2:  [96 %-100 %] 100 %  BP: ()/(56-90) 114/56     Weight: 107 kg (236 lb)  Body mass index is 27.27 kg/m².  No intake or output data in the 24 hours ending 08/02/23 1511      Physical Exam  Vitals and nursing note reviewed.   Constitutional:       Appearance: Normal appearance.   Cardiovascular:      Rate and Rhythm: Normal rate and regular rhythm.      Heart sounds: No murmur heard.     Comments: Telemetry monitoring  Pulmonary:      Effort: Pulmonary effort is normal.      Breath sounds: Normal breath sounds.    Abdominal:      General: Bowel sounds are normal.      Palpations: Abdomen is soft.   Musculoskeletal:         General: Normal range of motion.   Skin:     General: Skin is warm and dry.   Neurological:      General: No focal deficit present.      Mental Status: He is alert and oriented to person, place, and time.   Psychiatric:         Mood and Affect: Mood normal.         Behavior: Behavior normal.             Significant Labs: All pertinent labs within the past 24 hours have been reviewed.  CBC:   Recent Labs   Lab 08/01/23 2126 08/02/23  0423 08/02/23  0634   WBC 6.54 5.70 4.96   HGB 14.1 13.0* 13.4*   HCT 43.0 40.3 42.2    166 170     CMP:   Recent Labs   Lab 08/01/23 2126 08/02/23  0634    140   K 3.6 4.0    105   CO2 27 28   GLU 85 84   BUN 13 12   CREATININE 1.1 1.0   CALCIUM 9.5 9.2   PROT 7.6 6.9   ALBUMIN 4.1 3.8   BILITOT 0.7 0.8   ALKPHOS 61 59   AST 33 28   ALT 25 24   ANIONGAP 8 7*       Significant Imaging: I have reviewed all pertinent imaging results/findings within the past 24 hours.      Assessment/Plan:      * NSTEMI (non-ST elevated myocardial infarction)  Patient presents with NSTEMI. Chest pain is currently controlled. ADOLFO score is 2. Patient is currently on NSTEMI Pathway.    EKG reviewed. Troponins reviewed and results noted-   Recent Labs   Lab 08/02/23  0634   TROPONINI 2.049*     Lipid panel reviewed and shows-     Lab Results   Component Value Date    LDLCALC 141.8 08/01/2023     Lab Results   Component Value Date    TRIG 156 (H) 08/01/2023     Medical management includes; Beta Blocker, Dual Anti-Platelet therapy, Anticoagulation and High Intensity Statin Echo has been performed. Latest ECHO results are as follows- No results found for this or any previous visit.    -Consult for cardiac rehab is ordered  -Patient counseled on low cholesterol diet, attempt to lose weight, reduce salt in diet and cooking, reduce exposure to stress, improve dietary compliance,  continue current healthy lifestyle patterns and return for routine annual checkups.   -Cardiology consulted, appreciate assistance  -LHC done today, no CAD noted    Pericarditis  -LHC negative for CAD or blockages  -cardiology wants to treat for possible pericarditis  -started on ASA and colchicine BID today  -monitor    Hypertension  Chronic, controlled  -Latest blood pressure and vitals reviewed-   Temp:  [97.7 °F (36.5 °C)-98.3 °F (36.8 °C)]   Pulse:  [61-78]   Resp:  [0-28]   BP: ()/(56-90)   SpO2:  [96 %-100 %] .   -Home meds for hypertension were reviewed and noted below.     -While in the hospital, will manage blood pressure as follows; not on any home medications  -PRN anti-hypertensive medication if patient's BP > 160/100   -optimize pain management    Hyperlipidemia  Patient is not chronically on statin.will continue for now. Last Lipid Panel:   Lab Results   Component Value Date    CHOL 214 (H) 08/01/2023    HDL 41 08/01/2023    LDLCALC 141.8 08/01/2023    TRIG 156 (H) 08/01/2023    CHOLHDL 19.2 (L) 08/01/2023         VTE Risk Mitigation (From admission, onward)         Ordered     heparin 25,000 units in dextrose 5% (100 units/ml) IV bolus from bag - ADDITIONAL PRN BOLUS - 60 units/kg (max bolus 4000 units)  As needed (PRN)        Question:  Heparin Infusion Adjustment (DO NOT MODIFY ANSWER)  Answer:  [email or web address]\epic\Images\Pharmacy\HeparinInfusions\heparin LOW INTENSITY nomogram for [facility] [id number].pdf    08/01/23 2249     heparin 25,000 units in dextrose 5% (100 units/ml) IV bolus from bag - ADDITIONAL PRN BOLUS - 30 units/kg (max bolus 4000 units)  As needed (PRN)        Question:  Heparin Infusion Adjustment (DO NOT MODIFY ANSWER)  Answer:  \[email or web address]\epic\Images\Pharmacy\HeparinInfusions\heparin LOW INTENSITY nomogram for [facility] [id number].pdf    08/01/23 2249     heparin 25,000 units in dextrose 5% (100 units/ml) IV bolus from bag INITIAL BOLUS (max bolus 4000 units)  Once        Question:  Heparin  Infusion Adjustment (DO NOT MODIFY ANSWER)  Answer:  [email or web address]\epic\Images\Pharmacy\HeparinInfusions\heparin LOW INTENSITY nomogram for [facility] [id number].pdf    08/01/23 2249     heparin 25,000 units in dextrose 5% 250 mL (100 units/mL) infusion LOW INTENSITY nomogram - [facility]  Continuous        Question Answer Comment   Heparin Infusion Adjustment (DO NOT MODIFY ANSWER) \\[email or web address]\epic\Images\Pharmacy\HeparinInfusions\heparin LOW INTENSITY nomogram for [facility] [id number].pdf    Begin at (in units/kg/hr) 12        08/01/23 2249     IP VTE HIGH RISK PATIENT  Once         08/01/23 2248     Place sequential compression device  Until discontinued         08/01/23 2248     Reason for No Pharmacological VTE Prophylaxis  Once        Question:  Reasons:  Answer:  Physician Provided (leave comment)  Comment:  heparin    08/01/23 2248                Discharge Planning   ASPEN:      Code Status: Full Code   Is the patient medically ready for discharge?: No    Reason for patient still in hospital (select all that apply): Patient trending condition, Laboratory test, Treatment and Consult recommendations  Discharge Plan A: Home with family              Fawn Martinez NP  Department of Hospital Medicine   [facility] - Telemetry ([facility])

## 2023-08-02 NOTE — SUBJECTIVE & OBJECTIVE
Past Medical History:   Diagnosis Date    Adult general medical examination 11/28/2016    Hyperlipidemia     Leukopenia     NSTEMI (non-ST elevated myocardial infarction)        Past Surgical History:   Procedure Laterality Date    LEFT HEART CATHETERIZATION Left 5/24/2019    Procedure: CATHETERIZATION, HEART, LEFT;  Surgeon: Shaheen Rich MD;  Location: [facility] CATH LAB;  Service: Cardiology;  Laterality: Left;       Review of patient's allergies indicates:  No Known Allergies    No current facility-administered medications on file prior to encounter.     Current Outpatient Medications on File Prior to Encounter   Medication Sig    atorvastatin (LIPITOR) 20 MG tablet Take 1 tablet (20 mg total) by mouth every evening.    cholecalciferol, vitamin D3, 1,250 mcg (50,000 unit) capsule Take 1 capsule (50,000 Units total) by mouth once a week.     Family History       Problem Relation (Age of Onset)    Heart disease Paternal Grandfather    Hyperlipidemia Paternal Grandfather          Tobacco Use    Smoking status: Never    Smokeless tobacco: Never   Substance and Sexual Activity    Alcohol use: Yes     Comment: social    Drug use: No    Sexual activity: Yes     Partners: Female     Review of Systems   All other systems reviewed and are negative.    Objective:     Vital Signs (Most Recent):  Temp: 97.7 °F (36.5 °C) (08/01/23 2054)  Pulse: 62 (08/01/23 2243)  Resp: 19 (08/01/23 2200)  BP: 134/76 (08/01/23 2200)  SpO2: 98 % (08/01/23 2200) Vital Signs (24h Range):  Temp:  [97.7 °F (36.5 °C)] 97.7 °F (36.5 °C)  Pulse:  [62-67] 62  Resp:  [18-28] 19  SpO2:  [98 %-100 %] 98 %  BP: (133-171)/(76-90) 134/76     Weight: 108 kg (238 lb 1.6 oz)  Body mass index is 30.55 kg/m².     Physical Exam  Vitals reviewed.   Constitutional:       General: He is not in acute distress.     Appearance: Normal appearance. He is obese. He is not ill-appearing, toxic-appearing or diaphoretic.   HENT:      Head: Normocephalic and atraumatic.       Right Ear: External ear normal.      Left Ear: External ear normal.      Nose: Nose normal. No congestion or rhinorrhea.      Mouth/Throat:      Mouth: Mucous membranes are moist.      Pharynx: Oropharynx is clear. No oropharyngeal exudate or posterior oropharyngeal erythema.   Eyes:      General: No scleral icterus.     Extraocular Movements: Extraocular movements intact.      Conjunctiva/sclera: Conjunctivae normal.      Pupils: Pupils are equal, round, and reactive to light.   Neck:      Vascular: No carotid bruit.   Cardiovascular:      Rate and Rhythm: Normal rate and regular rhythm.      Pulses: Normal pulses.      Heart sounds: Normal heart sounds. No murmur heard.     No friction rub. No gallop.   Pulmonary:      Effort: Pulmonary effort is normal. No respiratory distress.      Breath sounds: Normal breath sounds. No stridor. No wheezing, rhonchi or rales.   Chest:      Chest wall: No tenderness.   Abdominal:      General: Abdomen is flat. Bowel sounds are normal. There is no distension.      Palpations: Abdomen is soft. There is no mass.      Tenderness: There is no abdominal tenderness. There is no guarding or rebound.      Hernia: No hernia is present.   Musculoskeletal:         General: No swelling, tenderness, deformity or signs of injury. Normal range of motion.      Cervical back: Normal range of motion and neck supple. No rigidity or tenderness.   Lymphadenopathy:      Cervical: No cervical adenopathy.   Skin:     General: Skin is warm and dry.      Capillary Refill: Capillary refill takes less than 2 seconds.      Coloration: Skin is not jaundiced or pale.      Findings: No bruising, erythema, lesion or rash.   Neurological:      General: No focal deficit present.      Mental Status: He is alert and oriented to person, place, and time. Mental status is at baseline.      Cranial Nerves: No cranial nerve deficit.      Sensory: No sensory deficit.      Motor: No weakness.      Coordination:  Coordination normal.   Psychiatric:         Mood and Affect: Mood normal.         Behavior: Behavior normal.         Thought Content: Thought content normal.         Judgment: Judgment normal.              CRANIAL NERVES     CN III, IV, VI   Pupils are equal, round, and reactive to light.       Significant Labs: All pertinent labs within the past 24 hours have been reviewed.    Significant Imaging: I have reviewed all pertinent imaging results/findings within the past 24 hours.    LABS:  Recent Results (from the past 24 hour(s))   TROPONIN    Collection Time: 08/01/23  5:38 PM   Result Value Ref Range    Troponin I 2.46 (H) 0.00 - 0.03 ng/mL   CBC auto differential    Collection Time: 08/01/23  9:26 PM   Result Value Ref Range    WBC 6.54 3.90 - 12.70 K/uL    RBC 5.03 4.60 - 6.20 M/uL    Hemoglobin 14.1 14.0 - 18.0 g/dL    Hematocrit 43.0 40.0 - 54.0 %    MCV 86 82 - 98 fL    MCH 28.0 27.0 - 31.0 pg    MCHC 32.8 32.0 - 36.0 g/dL    RDW 13.6 11.5 - 14.5 %    Platelets 183 150 - 450 K/uL    MPV 10.3 9.2 - 12.9 fL    Immature Granulocytes 0.5 0.0 - 0.5 %    Gran # (ANC) 3.5 1.8 - 7.7 K/uL    Immature Grans (Abs) 0.03 0.00 - 0.04 K/uL    Lymph # 2.5 1.0 - 4.8 K/uL    Mono # 0.4 0.3 - 1.0 K/uL    Eos # 0.1 0.0 - 0.5 K/uL    Baso # 0.03 0.00 - 0.20 K/uL    nRBC 0 0 /100 WBC    Gran % 53.3 38.0 - 73.0 %    Lymph % 37.9 18.0 - 48.0 %    Mono % 5.8 4.0 - 15.0 %    Eosinophil % 2.0 0.0 - 8.0 %    Basophil % 0.5 0.0 - 1.9 %    Differential Method Automated    Comprehensive metabolic panel    Collection Time: 08/01/23  9:26 PM   Result Value Ref Range    Sodium 140 136 - 145 mmol/L    Potassium 3.6 3.5 - 5.1 mmol/L    Chloride 105 95 - 110 mmol/L    CO2 27 23 - 29 mmol/L    Glucose 85 70 - 110 mg/dL    BUN 13 6 - 20 mg/dL    Creatinine 1.1 0.5 - 1.4 mg/dL    Calcium 9.5 8.7 - 10.5 mg/dL    Total Protein 7.6 6.0 - 8.4 g/dL    Albumin 4.1 3.5 - 5.2 g/dL    Total Bilirubin 0.7 0.1 - 1.0 mg/dL    Alkaline Phosphatase 61 55 - 135  U/L    AST 33 10 - 40 U/L    ALT 25 10 - 44 U/L    eGFR >60 >60 mL/min/1.73 m^2    Anion Gap 8 8 - 16 mmol/L   Troponin I #1    Collection Time: 08/01/23  9:26 PM   Result Value Ref Range    Troponin I 3.337 (H) 0.000 - 0.026 ng/mL   BNP    Collection Time: 08/01/23  9:26 PM   Result Value Ref Range    BNP 13 0 - 99 pg/mL       RADIOLOGY  X-Ray Chest 1 View    Result Date: 8/1/2023  XR CHEST 1 VIEW HISTORY:  chest pain,  other. COMPARISON: No prior study. Single frontal view of the chest.    1.  No cardiac decompensation, pleural fluid, consolidation, pneumothorax or pneumoperitoneum. 2.  The osseous and soft tissues show no acute finding.      EKG    MICROBIOLOGY    MDM

## 2023-08-02 NOTE — SUBJECTIVE & OBJECTIVE
Past Medical History:   Diagnosis Date    Adult general medical examination 11/28/2016    Hyperlipidemia     Leukopenia     NSTEMI (non-ST elevated myocardial infarction)        Past Surgical History:   Procedure Laterality Date    LEFT HEART CATHETERIZATION Left 5/24/2019    Procedure: CATHETERIZATION, HEART, LEFT;  Surgeon: Shaheen Rich MD;  Location: [facility] CATH LAB;  Service: Cardiology;  Laterality: Left;       Review of patient's allergies indicates:  No Known Allergies    No current facility-administered medications on file prior to encounter.     Current Outpatient Medications on File Prior to Encounter   Medication Sig    atorvastatin (LIPITOR) 20 MG tablet Take 1 tablet (20 mg total) by mouth every evening.    cholecalciferol, vitamin D3, 1,250 mcg (50,000 unit) capsule Take 1 capsule (50,000 Units total) by mouth once a week.     Family History       Problem Relation (Age of Onset)    Heart disease Paternal Grandfather    Hyperlipidemia Paternal Grandfather          Tobacco Use    Smoking status: Never    Smokeless tobacco: Never   Substance and Sexual Activity    Alcohol use: Yes     Comment: social    Drug use: No    Sexual activity: Yes     Partners: Female     Review of Systems   Constitutional: Negative.   HENT: Negative.     Eyes: Negative.    Cardiovascular:  Positive for chest pain (resolved).   Respiratory: Negative.     Endocrine: Negative.    Hematologic/Lymphatic: Negative.    Skin: Negative.    Musculoskeletal:  Positive for joint pain (L arm pain).   Gastrointestinal: Negative.    Genitourinary: Negative.    Neurological: Negative.    Psychiatric/Behavioral: Negative.     Allergic/Immunologic: Negative.      Objective:     Vital Signs (Most Recent):  Temp: 98.3 °F (36.8 °C) (08/02/23 0732)  Pulse: 64 (08/02/23 0732)  Resp: 16 (08/02/23 0732)  BP: (!) 97/57 (08/02/23 0732)  SpO2: 99 % (08/02/23 0732) Vital Signs (24h Range):  Temp:  [97.7 °F (36.5 °C)-98.3 °F (36.8 °C)] 98.3 °F (36.8  °C)  Pulse:  [62-74] 64  Resp:  [16-28] 16  SpO2:  [96 %-100 %] 99 %  BP: ()/(57-90) 97/57     Weight: 107 kg (236 lb)  Body mass index is 27.27 kg/m².    SpO2: 99 %       No intake or output data in the 24 hours ending 08/02/23 1014    Lines/Drains/Airways       Peripheral Intravenous Line  Duration                  Peripheral IV - Single Lumen 08/01/23 2117 20 G Right Antecubital <1 day         Peripheral IV - Single Lumen 08/02/23 0030 20 G Anterior;Left;Proximal Forearm <1 day                     Physical Exam  Vitals and nursing note reviewed.   Constitutional:       General: He is not in acute distress.     Appearance: Normal appearance. He is well-developed. He is not diaphoretic.   HENT:      Head: Normocephalic and atraumatic.   Eyes:      General:         Right eye: No discharge.         Left eye: No discharge.      Pupils: Pupils are equal, round, and reactive to light.   Neck:      Thyroid: No thyromegaly.      Vascular: No JVD.      Trachea: No tracheal deviation.   Cardiovascular:      Rate and Rhythm: Normal rate and regular rhythm.      Heart sounds: Normal heart sounds, S1 normal and S2 normal. No murmur heard.  Pulmonary:      Effort: Pulmonary effort is normal. No respiratory distress.      Breath sounds: Normal breath sounds. No wheezing or rales.   Abdominal:      General: There is no distension.      Palpations: Abdomen is soft.      Tenderness: There is no rebound.   Musculoskeletal:      Cervical back: Neck supple.      Right lower leg: No edema.      Left lower leg: No edema.   Skin:     General: Skin is warm and dry.      Findings: No erythema.   Neurological:      General: No focal deficit present.      Mental Status: He is alert and oriented to person, place, and time.   Psychiatric:         Mood and Affect: Mood normal.         Behavior: Behavior normal.         Thought Content: Thought content normal.          Significant Labs: CMP   Recent Labs   Lab 08/01/23 2126 08/02/23  0634     140   K 3.6 4.0    105   CO2 27 28   GLU 85 84   BUN 13 12   CREATININE 1.1 1.0   CALCIUM 9.5 9.2   PROT 7.6 6.9   ALBUMIN 4.1 3.8   BILITOT 0.7 0.8   ALKPHOS 61 59   AST 33 28   ALT 25 24   ANIONGAP 8 7*   , CBC   Recent Labs   Lab 08/01/23  2126 08/02/23  0423 08/02/23  0634   WBC 6.54 5.70 4.96   HGB 14.1 13.0* 13.4*   HCT 43.0 40.3 42.2    166 170   , Troponin   Recent Labs   Lab 08/02/23  0039 08/02/23  0423 08/02/23  0634   TROPONINI 3.253* 2.279* 2.049*   , and All pertinent lab results from the last 24 hours have been reviewed.    Significant Imaging: Echocardiogram: Transthoracic echo (TTE) complete (Cupid Only):   Results for orders placed or performed during the hospital encounter of 08/01/23   Echo   Result Value Ref Range    BSA 2.43 m2    LVOT stroke volume 64.65 cm3    LVIDd 4.61 3.5 - 6.0 cm    LV Systolic Volume 43.88 mL    LV Systolic Volume Index 18.1 mL/m2    LVIDs 3.29 2.1 - 4.0 cm    LV Diastolic Volume 97.60 mL    LV Diastolic Volume Index 40.33 mL/m2    IVS 1.16 (A) 0.6 - 1.1 cm    LVOT diameter 2.32 cm    LVOT area 4.2 cm2    FS 29 28 - 44 %    Left Ventricle Relative Wall Thickness 0.59 cm    Posterior Wall 1.37 (A) 0.6 - 1.1 cm    LV mass 221.93 g    LV Mass Index 92 g/m2    MV Peak E Ej 0.81 m/s    TDI LATERAL 0.11 m/s    TDI SEPTAL 0.14 m/s    E/E' ratio 6.48 m/s    MV Peak A Ej 0.44 m/s    TR Max Ej 1.80 m/s    E/A ratio 1.84     IVRT 76.12 msec    E wave deceleration time 160.43 msec    LV SEPTAL E/E' RATIO 5.79 m/s    LV LATERAL E/E' RATIO 7.36 m/s    PV Peak S Ej 0.49 m/s    PV Peak D Ej 0.38 m/s    Pulm vein S/D ratio 1.29     LVOT peak ej 0.83 m/s    Left Ventricular Outflow Tract Mean Velocity 0.52 cm/s    Left Ventricular Outflow Tract Mean Gradient 1.30 mmHg    LA size 3.03 cm    Left Atrium Major Axis 5.68 cm    Left Atrium Minor Axis 4.30 cm    RV mid diameter 3.25 cm    RVOT peak VTI 11.0 cm    RA Major Axis 5.92 cm    AV mean gradient 2 mmHg    AV  peak gradient 4 mmHg    Ao peak demi 0.94 m/s    Ao VTI 15.20 cm    LVOT peak VTI 15.30 cm    AV valve area 4.25 cm²    AV Velocity Ratio 0.88     AV index (prosthetic) 1.01     ETHAN by Velocity Ratio 3.73 cm²    Triscuspid Valve Regurgitation Peak Gradient 13 mmHg    PV mean gradient 1 mmHg    PV peak gradient 2     RVOT peak demi 0.66 m/s    Ao root annulus 3.06 cm    STJ 2.86 cm    Ascending aorta 3.07 cm    IVC diameter 1.46 cm    Mean e' 0.13 m/s    ZLVIDS -5.78     ZLVIDD -9.09     LA Volume Index 22.4 mL/m2    LA volume 54.21 cm3    LA WIDTH 4.3 cm    TAPSE 2.80 cm   , EKG: Reviewed, and X-Ray: CXR: X-Ray Chest 1 View (CXR): No results found for this visit on 08/01/23. and X-Ray Chest PA and Lateral (CXR): No results found for this visit on 08/01/23.

## 2023-08-02 NOTE — ASSESSMENT & PLAN NOTE
Chronic, controlled  -Latest blood pressure and vitals reviewed-   Temp:  [97.7 °F (36.5 °C)-98.3 °F (36.8 °C)]   Pulse:  [61-78]   Resp:  [0-28]   BP: ()/(56-90)   SpO2:  [96 %-100 %] .   -Home meds for hypertension were reviewed and noted below.     -While in the hospital, will manage blood pressure as follows; not on any home medications  -PRN anti-hypertensive medication if patient's BP > 160/100   -optimize pain management

## 2023-08-02 NOTE — PLAN OF CARE
REPORT CALLED TO ZACKARY. VSS LEFT WRIST DRESSING DRY AND INTACT WITH IMMOBILIZER ON. TELEM MONITOR ON. CARE OF PATIENT RELEASED TO [facility].

## 2023-08-02 NOTE — HPI
Mr. Mccarthy is a 38 year old male patient whose current medical conditions include hyperlipidemia, NSTEMI in 5/19 (s/p LHC that showed normal coronaries), pericarditis, and ? Coronary vasospasm who presented to [facility] ED yesterday evening due to left-sided chest tingling/discomfort that onset yesterday after eating lunch. Patient initially attributed his symptom to indigestion but became concerned when pain radiated down his left arm and persisted for several hours. He denied any associated SOB, nausea, vomiting, palpitations, near syncope, or syncope. He initially was evaluated at [facility] in  but left AMA prior to being seen. Initial workup in ED revealed troponin of 2.46>3.337 and patient was subsequently admitted for further evaluation and treatment. Cardiology consulted to assist with management. Patient seen and examined today,r resting in bed. Currently CP free. No other CV complaints. Non-smoker. No familial history of CAD. Followed by outside cardiologist, Dr. Hickman. States he is on no meds as OP. Troponin trending down. Echo pending. [facility] planned today by Dr. Tellez.

## 2023-08-02 NOTE — ASSESSMENT & PLAN NOTE
Patient presents with NSTEMI. Chest pain is currently controlled. ADOLFO score is 2. Patient is currently on NSTEMI Pathway.    EKG reviewed. Troponins reviewed and results noted-   Recent Labs   Lab 08/02/23  0039   TROPONINI 3.253*     Lipid panel reviewed and shows-     Lab Results   Component Value Date    LDLCALC 158 (H) 06/20/2022     Lab Results   Component Value Date    TRIG 113 06/20/2022     Medical management includes; Beta Blocker, Dual Anti-Platelet therapy, Anticoagulation and High Intensity Statin Echo has been performed. Latest ECHO results are as follows- No results found for this or any previous visit.    Consult for cardiac rehab is ordered. Patient counseled on lifestyle modifications- follow a low fat, low cholesterol diet, attempt to lose weight, reduce salt in diet and cooking, reduce exposure to stress, improve dietary compliance, continue current healthy lifestyle patterns and return for routine annual checkups. Cardiology is consulted. Plan of care pending with cardiology team. Continue to monitor patient closely and adjust therapy as needed.

## 2023-08-03 VITALS
WEIGHT: 240.75 LBS | HEIGHT: 78 IN | BODY MASS INDEX: 27.85 KG/M2 | SYSTOLIC BLOOD PRESSURE: 130 MMHG | RESPIRATION RATE: 18 BRPM | DIASTOLIC BLOOD PRESSURE: 79 MMHG | TEMPERATURE: 98 F | OXYGEN SATURATION: 100 % | HEART RATE: 68 BPM

## 2023-08-03 LAB
ALBUMIN SERPL BCP-MCNC: 3.6 G/DL (ref 3.5–5.2)
ALP SERPL-CCNC: 65 U/L (ref 55–135)
ALT SERPL W/O P-5'-P-CCNC: 27 U/L (ref 10–44)
ANION GAP SERPL CALC-SCNC: 6 MMOL/L (ref 8–16)
AST SERPL-CCNC: 24 U/L (ref 10–40)
BASOPHILS # BLD AUTO: 0.03 K/UL (ref 0–0.2)
BASOPHILS NFR BLD: 0.6 % (ref 0–1.9)
BILIRUB SERPL-MCNC: 0.5 MG/DL (ref 0.1–1)
BUN SERPL-MCNC: 14 MG/DL (ref 6–20)
CALCIUM SERPL-MCNC: 9.2 MG/DL (ref 8.7–10.5)
CHLORIDE SERPL-SCNC: 106 MMOL/L (ref 95–110)
CO2 SERPL-SCNC: 27 MMOL/L (ref 23–29)
CREAT SERPL-MCNC: 1.2 MG/DL (ref 0.5–1.4)
D DIMER PPP IA.FEU-MCNC: <0.19 MG/L FEU
DIFFERENTIAL METHOD: ABNORMAL
EOSINOPHIL # BLD AUTO: 0.1 K/UL (ref 0–0.5)
EOSINOPHIL NFR BLD: 1.7 % (ref 0–8)
ERYTHROCYTE [DISTWIDTH] IN BLOOD BY AUTOMATED COUNT: 13.6 % (ref 11.5–14.5)
ERYTHROCYTE [SEDIMENTATION RATE] IN BLOOD BY WESTERGREN METHOD: 6 MM/HR (ref 0–10)
EST. GFR  (NO RACE VARIABLE): >60 ML/MIN/1.73 M^2
GLUCOSE SERPL-MCNC: 95 MG/DL (ref 70–110)
HCT VFR BLD AUTO: 43 % (ref 40–54)
HGB BLD-MCNC: 13.8 G/DL (ref 14–18)
IMM GRANULOCYTES # BLD AUTO: 0.01 K/UL (ref 0–0.04)
IMM GRANULOCYTES NFR BLD AUTO: 0.2 % (ref 0–0.5)
LYMPHOCYTES # BLD AUTO: 1.8 K/UL (ref 1–4.8)
LYMPHOCYTES NFR BLD: 33.2 % (ref 18–48)
MCH RBC QN AUTO: 27.9 PG (ref 27–31)
MCHC RBC AUTO-ENTMCNC: 32.1 G/DL (ref 32–36)
MCV RBC AUTO: 87 FL (ref 82–98)
MONOCYTES # BLD AUTO: 0.5 K/UL (ref 0.3–1)
MONOCYTES NFR BLD: 9.1 % (ref 4–15)
NEUTROPHILS # BLD AUTO: 2.9 K/UL (ref 1.8–7.7)
NEUTROPHILS NFR BLD: 55.2 % (ref 38–73)
NRBC BLD-RTO: 0 /100 WBC
PLATELET # BLD AUTO: 160 K/UL (ref 150–450)
PMV BLD AUTO: 10.4 FL (ref 9.2–12.9)
POTASSIUM SERPL-SCNC: 4.2 MMOL/L (ref 3.5–5.1)
PROT SERPL-MCNC: 6.8 G/DL (ref 6–8.4)
RBC # BLD AUTO: 4.94 M/UL (ref 4.6–6.2)
SODIUM SERPL-SCNC: 139 MMOL/L (ref 136–145)
WBC # BLD AUTO: 5.3 K/UL (ref 3.9–12.7)

## 2023-08-03 PROCEDURE — 25000003 PHARM REV CODE 250: Performed by: PHYSICIAN ASSISTANT

## 2023-08-03 PROCEDURE — 93005 ELECTROCARDIOGRAM TRACING: CPT

## 2023-08-03 PROCEDURE — 36415 COLL VENOUS BLD VENIPUNCTURE: CPT | Performed by: INTERNAL MEDICINE

## 2023-08-03 PROCEDURE — 93010 EKG 12-LEAD: ICD-10-PCS | Mod: ,,, | Performed by: INTERNAL MEDICINE

## 2023-08-03 PROCEDURE — 99213 OFFICE O/P EST LOW 20 MIN: CPT | Mod: 25,,, | Performed by: INTERNAL MEDICINE

## 2023-08-03 PROCEDURE — 80053 COMPREHEN METABOLIC PANEL: CPT | Performed by: HOSPITALIST

## 2023-08-03 PROCEDURE — G0378 HOSPITAL OBSERVATION PER HR: HCPCS

## 2023-08-03 PROCEDURE — 99900035 HC TECH TIME PER 15 MIN (STAT)

## 2023-08-03 PROCEDURE — 25000003 PHARM REV CODE 250: Performed by: HOSPITALIST

## 2023-08-03 PROCEDURE — 85651 RBC SED RATE NONAUTOMATED: CPT | Performed by: INTERNAL MEDICINE

## 2023-08-03 PROCEDURE — 93010 ELECTROCARDIOGRAM REPORT: CPT | Mod: ,,, | Performed by: INTERNAL MEDICINE

## 2023-08-03 PROCEDURE — 36415 COLL VENOUS BLD VENIPUNCTURE: CPT | Performed by: HOSPITALIST

## 2023-08-03 PROCEDURE — 85025 COMPLETE CBC W/AUTO DIFF WBC: CPT | Performed by: HOSPITALIST

## 2023-08-03 PROCEDURE — 99213 PR OFFICE/OUTPT VISIT, EST, LEVL III, 20-29 MIN: ICD-10-PCS | Mod: 25,,, | Performed by: INTERNAL MEDICINE

## 2023-08-03 PROCEDURE — 85379 FIBRIN DEGRADATION QUANT: CPT | Performed by: INTERNAL MEDICINE

## 2023-08-03 RX ORDER — ATORVASTATIN CALCIUM 20 MG/1
20 TABLET, FILM COATED ORAL NIGHTLY
Qty: 30 TABLET | Refills: 0 | Status: SHIPPED | OUTPATIENT
Start: 2023-08-03 | End: 2024-02-19 | Stop reason: SDUPTHER

## 2023-08-03 RX ORDER — NAPROXEN SODIUM 220 MG/1
81 TABLET, FILM COATED ORAL DAILY
Qty: 30 TABLET | Refills: 0 | Status: SHIPPED | OUTPATIENT
Start: 2023-08-03 | End: 2024-08-02

## 2023-08-03 RX ADMIN — METOPROLOL TARTRATE 25 MG: 25 TABLET, FILM COATED ORAL at 08:08

## 2023-08-03 RX ADMIN — COLCHICINE 0.6 MG: 0.6 TABLET, FILM COATED ORAL at 08:08

## 2023-08-03 RX ADMIN — ATORVASTATIN CALCIUM 80 MG: 40 TABLET, FILM COATED ORAL at 08:08

## 2023-08-03 RX ADMIN — CLOPIDOGREL BISULFATE 75 MG: 75 TABLET ORAL at 08:08

## 2023-08-03 RX ADMIN — ASPIRIN 650 MG: 325 TABLET ORAL at 08:08

## 2023-08-03 NOTE — PLAN OF CARE
Problem: Adult Inpatient Plan of Care  Goal: Plan of Care Review  Outcome: Ongoing, Progressing  Goal: Patient-Specific Goal (Individualized)  Outcome: Ongoing, Progressing  Goal: Absence of Hospital-Acquired Illness or Injury  Outcome: Ongoing, Progressing  Goal: Optimal Comfort and Wellbeing  Outcome: Ongoing, Progressing  Goal: Readiness for Transition of Care  Outcome: Ongoing, Progressing     Problem: Adjustment to Illness (Acute Coronary Syndrome)  Goal: Optimal Adaptation to Illness  Outcome: Ongoing, Progressing     Problem: Dysrhythmia (Acute Coronary Syndrome)  Goal: Normalized Cardiac Rhythm  Outcome: Ongoing, Progressing     Problem: Cardiac-Related Pain (Acute Coronary Syndrome)  Goal: Absence of Cardiac-Related Pain  Outcome: Ongoing, Progressing     Problem: Hemodynamic Instability (Acute Coronary Syndrome)  Goal: Effective Cardiac Pump Function  Outcome: Ongoing, Progressing     Problem: Tissue Perfusion (Acute Coronary Syndrome)  Goal: Adequate Tissue Perfusion  Outcome: Ongoing, Progressing     Problem: Arrhythmia/Dysrhythmia (Cardiac Catheterization)  Goal: Stable Heart Rate and Rhythm  Outcome: Ongoing, Progressing     Problem: Bleeding (Cardiac Catheterization)  Goal: Absence of Bleeding  Outcome: Ongoing, Progressing     Problem: Contrast-Induced Injury Risk (Cardiac Catheterization)  Goal: Absence of Contrast-Induced Injury  Outcome: Ongoing, Progressing     Problem: Embolism (Cardiac Catheterization)  Goal: Absence of Embolism Signs and Symptoms  Outcome: Ongoing, Progressing     Problem: Ongoing Anesthesia/Sedation Effects (Cardiac Catheterization)  Goal: Anesthesia/Sedation Recovery  Outcome: Ongoing, Progressing     Problem: Pain (Cardiac Catheterization)  Goal: Acceptable Pain Control  Outcome: Ongoing, Progressing     Problem: Vascular Access Protection (Cardiac Catheterization)  Goal: Absence of Vascular Access Complication  Outcome: Ongoing, Progressing     Problem: Chest Pain  Goal:  Resolution of Chest Pain Symptoms  Outcome: Ongoing, Progressing

## 2023-08-03 NOTE — PLAN OF CARE
A214/A214 RAMIROTerrance Mccarthy is a 38 y.o.male admitted on 8/1/2023 for NSTEMI (non-ST elevated myocardial infarction)   Code Status: Full Code MRN: [id number]   Review of patient's allergies indicates:  No Known Allergies  Past Medical History:   Diagnosis Date    Adult general medical examination 11/28/2016    Hyperlipidemia     Leukopenia     NSTEMI (non-ST elevated myocardial infarction)       PRN meds    sodium chloride 0.9%, , Continuous PRN  acetaminophen, 650 mg, Q6H PRN  acetaminophen, 650 mg, Q8H PRN  aluminum-magnesium hydroxide-simethicone, 30 mL, QID PRN  glucagon (human recombinant), 1 mg, PRN  glucose, 16 g, PRN  glucose, 24 g, PRN  HYDROcodone-acetaminophen, 1 tablet, Q6H PRN  melatonin, 6 mg, Nightly PRN  morphine, 2 mg, Q4H PRN  naloxone, 0.02 mg, PRN  nitroGLYCERIN, 0.4 mg, Q5 Min PRN  ondansetron, 4 mg, Q8H PRN  promethazine, 25 mg, Q6H PRN  senna-docusate 8.6-50 mg, 1 tablet, BID PRN      AVS Discharge instructions received and reviewed with pt.  Pt voiced understanding and all questions answered to satisfaction.  Stressed importance to making and keeping all follow up appointments.  Medications at bedside and reviewed with pt.  Tele monitor removed and brought to monitor tech.  IV d/c'd with tip intact, pressure dressing applied.  Pt will call when ready to be transported to [facility] hospital via w/c to be discharged home.               Lead Monitored: Lead II Rhythm: normal sinus rhythm    Cardiac/Telemetry Box Number: [id number]  VTE Required Core Measure: Pharmacological prophylaxis initiated/maintained Last Bowel Movement: 08/01/23  Diet Cardiac     Renaldo Score: 23  Fall Risk Score: 7  Accucheck []   Freq?      Lines/Drains/Airways       Peripheral Intravenous Line  Duration                  Peripheral IV - Single Lumen 08/01/23 2117 20 G Right Antecubital 1 day         Peripheral IV - Single Lumen 08/02/23 0030 20 G Anterior;Left;Proximal Forearm 1 day

## 2023-08-03 NOTE — ASSESSMENT & PLAN NOTE
Patient is not chronically on statin.will continue for now. Last Lipid Panel:   Lab Results   Component Value Date    CHOL 214 (H) 08/01/2023    HDL 41 08/01/2023    LDLCALC 141.8 08/01/2023    TRIG 156 (H) 08/01/2023    CHOLHDL 19.2 (L) 08/01/2023   -patient said he was on statin, but was told he could stop it if he controlled his diet  -will restart on d/c per cardiology recommendations

## 2023-08-03 NOTE — HOSPITAL COURSE
8/3/23-Patient seen and examined today, s/p [facility] yesterday which showed normal coronaries. Feels well. NO AEON. No recurrent CP. Denies SOB. Admits to increased stress recently. CRP and sed rate WNL. D-dimer negative.    FAMILY MEDICINE OFFICE VISIT      Patient: Toni Loera Date of Service: 2019   : 1939 MRN: [id number]     SUBJECTIVE:     CHIEF COMPLAINT:  Chief Complaint   Patient presents with   • Hand Injury         HISTORY OF PRESENT ILLNESS:  Toni Loera is a 79 year old male who presents today for hand swelling.    Patient was bitten by a dog 2 weeks ago in his right hand.  He had more swelling and warmth, that resolved but still has left 5th finger swelling and pain.  Pain 2-3/10.  He can move his hand easier.  He likes to exercise, swimming.  Pt has old scar right palm, from 6 years old.    Pt has been taking atorvastatin, metoprolol, asa regularly without side effects.  He denies any cardiovascular problems.    Pt was diagnosed with RA. No longer following up with rheumatologist  He denies any joint pain.        PAST MEDICAL HISTORY:  Past Medical History:   Diagnosis Date   • Myocardial infarction (CMS/HCC)        MEDICATIONS:  Current Outpatient Medications   Medication Sig   • metoPROLOL tartrate (LOPRESSOR) 25 MG tablet Take 1 tablet by mouth 2 times daily.   • atorvastatin (LIPITOR) 80 MG tablet Take 1 tablet by mouth daily.   • aspirin (ASPIRIN ADULT LOW DOSE) 81 MG EC tablet Take 1 tablet by mouth daily.   • amoxicillin-clavulanate (AUGMENTIN) 875-125 MG per tablet Take 1 tablet by mouth 2 times daily for 10 days.     No current facility-administered medications for this visit.        ALLERGIES:  ALLERGIES:  No Known Allergies    PAST SURGICAL HISTORY:  Past Surgical History:   Procedure Laterality Date   • Hernia repair     • Joint replacement      hip  and  [facility]   • [facility]  2012    Dr Fei FIORE [phone]       FAMILY HISTORY:  Family History   Problem Relation Age of Onset   • Patient is unaware of any medical problems Brother        SOCIAL HISTORY:  Social History     Tobacco Use   • Smoking status: Current Some Day Smoker   • Smokeless tobacco: Never Used    Substance Use Topics   • Alcohol use: Yes     Comment: socially, 1-2 times per 6 months 2-3 drinks with friends   • Drug use: Not on file       Review of Systems   Constitutional: Negative for activity change, appetite change, chills, diaphoresis, fatigue, fever and unexpected weight change.   HENT: Negative for congestion, ear discharge, ear pain, rhinorrhea, sinus pressure, sinus pain, sneezing, sore throat, trouble swallowing and voice change.    Eyes: Negative for photophobia, pain and visual disturbance.   Respiratory: Negative for cough, chest tightness, shortness of breath and wheezing.    Cardiovascular: Negative for chest pain, palpitations and leg swelling.   Gastrointestinal: Negative for abdominal pain, blood in stool, constipation, diarrhea, nausea and vomiting.   Endocrine: Negative for cold intolerance, heat intolerance, polydipsia, polyphagia and polyuria.   Genitourinary: Negative for decreased urine volume, difficulty urinating, dysuria, enuresis, flank pain, frequency, hematuria and urgency.   Musculoskeletal: Negative for gait problem and neck stiffness.        Right hand palmar swelling, tenderness, redness 5 th finger   Skin: Negative for rash.   Neurological: Negative for dizziness, tremors, seizures, syncope, facial asymmetry, speech difficulty, weakness, numbness and headaches.   Hematological: Does not bruise/bleed easily.   Psychiatric/Behavioral: Negative for agitation, behavioral problems, confusion and suicidal ideas.         OBJECTIVE:     Physical Exam   Constitutional: He is oriented to person, place, and time. He appears well-developed and well-nourished. No distress.   HENT:   Head: Normocephalic and atraumatic.   Right Ear: External ear normal.   Left Ear: External ear normal.   Nose: Nose normal.   Mouth/Throat: Oropharynx is clear and moist. No oropharyngeal exudate.   Eyes: Conjunctivae and EOM are normal. Pupils are equal, round, and reactive to light. Right eye exhibits no  discharge. Left eye exhibits no discharge. No scleral icterus.   Neck: Normal range of motion. Neck supple. No tracheal deviation present. No thyromegaly present.   Cardiovascular: Normal rate, regular rhythm and normal heart sounds.   Pulmonary/Chest: Effort normal and breath sounds normal. No stridor. No respiratory distress. He has no wheezes. He has no rales. He exhibits no tenderness.   Abdominal: Soft. Bowel sounds are normal. He exhibits no distension and no mass. There is no tenderness. There is no rebound and no guarding.   Musculoskeletal: Normal range of motion. He exhibits no tenderness or deformity.   Lymphadenopathy:     He has no cervical adenopathy.   Neurological: He is alert and oriented to person, place, and time. He displays normal reflexes. No cranial nerve deficit. He exhibits normal muscle tone. Coordination normal.   Skin: Skin is warm. No rash noted. He is not diaphoretic. No pallor.   Right palm 5 th finger swelling, redness, tenderness,   2 scabs   Psychiatric: He has a normal mood and affect. His behavior is normal. Judgment and thought content normal.   Nursing note and vitals reviewed.      Visit Vitals  /70   Pulse 76   Temp 97.8 °F (36.6 °C)   Resp 18   Wt 72.4 kg (159 lb 9.8 oz)   BMI 26.56 kg/m²         Wt Readings from Last 1 Encounters:   02/16/19 72.4 kg (159 lb 9.8 oz)          DIAGNOSTIC STUDIES:     LAB RESULTS:    Lab Results Reviewed and   No visits with results within 6 Month(s) from this visit.   Latest known visit with results is:   Lab Services on 07/22/2017   Component Date Value Ref Range Status   • PSA, Total 07/22/2017 1.68  <4.01 ng/ml Final   • COLOR 07/22/2017 YELLOW  YELLOW   Final   • APPEARANCE 07/22/2017 CLEAR    Final   • SPECIFIC GRAVITY 07/22/2017 1.017  1.005 - 1.030   Final   • pH 07/22/2017 5.0  5.0 - 7.0 Units Final   • PROTEIN(URINE) 07/22/2017 NEGATIVE  NEGATIVE mg/dl Final   • GLUCOSE(URINE) 07/22/2017 NEGATIVE  NEGATIVE mg/dl Final   • KETONES  07/22/2017 NEGATIVE  NEGATIVE mg/dl Final   • UROBILINOGEN 07/22/2017 0.2  0.0 - 1.0 mg/dl Final   • BILIRUBIN 07/22/2017 NEGATIVE  NEGATIVE   Final   • RBC-URINE 07/22/2017 NEGATIVE  NEGATIVE   Final   • NITRITE 07/22/2017 NEGATIVE  NEGATIVE   Final   • WBC-URINE 07/22/2017 NEGATIVE  NEGATIVE   Final   • Squamous EPI'S 07/22/2017 NONE SEEN  0 - 5 /HPF Final   • RBC 07/22/2017 1 to 3  0 - 3 /HPF Final   • WBC 07/22/2017 1 to 5  0 - 5 /HPF Final   • BACTERIA 07/22/2017 NONE SEEN  NONE SEEN /HPF Final   • Hyaline Casts 07/22/2017 NONE SEEN  0 - 5 /LPF Final   • SPECIMEN TYPE 07/22/2017 URINE, CLEAN CATCH/MIDSTREAM    Final   • MUCOUS 07/22/2017 PRESENT    Final   • WHITE BLOOD COUNT 07/22/2017 7.3  4.2 - 11.0 K/mcL Final   • RED CELL COUNT 07/22/2017 4.52  4.50 - 5.90 mil/mcL Final   • HEMOGLOBIN 07/22/2017 15.1  13.0 - 17.0 g/dl Final   • HEMATOCRIT 07/22/2017 45.0  39.0 - 51.0 % Final   • MEAN CORPUSCULAR VOLUME 07/22/2017 99.6  78.0 - 100.0 fL Final   • MEAN CORPUSCULAR HEMOGLOBIN 07/22/2017 33.4  26.0 - 34.0 pg Final   • MEAN CORPUSCULAR HGB CONC 07/22/2017 33.6  32.0 - 36.5 g/dl Final   • RDW-CV 07/22/2017 13.3  11.0 - 15.0 % Final   • PLATELET COUNT 07/22/2017 232  140 - 450 K/mcL Final   • Neutrophil 07/22/2017 54  % Final   • LYMPH 07/22/2017 29  % Final   • MONO 07/22/2017 12  % Final   • EOSIN 07/22/2017 4  % Final   • BASO 07/22/2017 1  % Final   • Absolute Neutrophil 07/22/2017 4  1.8 - 7.7 K/mcL Final   • Absolute Lymph 07/22/2017 2.1  1.0 - 4.0 K/mcL Final   • Absolute Mono 07/22/2017 0.9  0.3 - 0.9 K/mcL Final   • Absolute Eos 07/22/2017 0.3  0.1 - 0.5 K/mcL Final   • Absolute Baso 07/22/2017 0  0.0 - 0.3 K/mcL Final   • Sodium 07/22/2017 140  135 - 145 mmol/L Final   • Potassium 07/22/2017 4.4  3.4 - 5.1 mmol/L Final   • Chloride 07/22/2017 105  98 - 107 mmol/L Final   • Carbon Dioxide 07/22/2017 26  21 - 32 mmol/L Final   • Anion Gap 07/22/2017 13  10 - 20 mmol/L Final   • Glucose 07/22/2017 99  65 -  99 mg/dl Final   • BUN 07/22/2017 21* 6 - 20 mg/dl Final   • Creatinine 07/22/2017 0.76  0.67 - 1.17 mg/dl Final   • GFR Estimate,  07/22/2017 >90    Final   • GFR Estimate, Non  07/22/2017 87    Final   • BUN/Creatinine Ratio 07/22/2017 28* 7 - 25   Final   • TOTAL BILIRUBIN 07/22/2017 0.7  0.2 - 1.0 mg/dl Final   • AST/SGOT 07/22/2017 63* <38 Units/L Final   • ALK PHOSPHATASE 07/22/2017 60  45 - 117 Units/L Final   • Albumin 07/22/2017 3.7  3.6 - 5.1 g/dl Final   • TOTAL PROTEIN 07/22/2017 8.5* 6.4 - 8.2 g/dl Final   • GLOBULIN 07/22/2017 4.8* 2.0 - 4.0 g/dl Final   • A/G Ratio, Serum 07/22/2017 0.8* 1.0 - 2.4   Final   • CALCIUM 07/22/2017 9.2  8.4 - 10.2 mg/dl Final   • ALT/SGPT 07/22/2017 52  <79 Units/L Final   • FASTING STATUS 07/22/2017 12  hrs Final   • CHOLESTEROL 07/22/2017 123  <200 mg/dl Final   • HDL 07/22/2017 57  >39 mg/dl Final   • TRIGLYCERIDE 07/22/2017 57  <150 mg/dl Final   • CALCULATED LDL 07/22/2017 55  <130 mg/dl Final   • CALCULATED NON HDL 07/22/2017 66  mg/dl Final   • CHOL/HDL 07/22/2017 2.2  <4.5   Final   • TSH 07/22/2017 2.501  0.350 - 5.000 mcUnits/mL Final   • DIFF TYPE 07/22/2017 AUTOMATED DIFFERENTIAL    Final   • Fasting Status 07/22/2017 12  hrs Final        Assessment AND PLAN:     Dog bite right 5th finger with hand cellulitis: start augmentin, hand surgeon referral.  Call if worsening, fever, numbness.  Cad/h/o mi: patient asymptomatic, continue present management.  Lab work advised.  Tdap.    Return in about 10 days (around 2/26/2019), or if symptoms worsen or fail to improve, for hand cellulitis.    Instructions provided as documented in the Visit Summary.    Medical compliance with plan discussed and risk of noncompliance reviewed.    Patient education completed on disease process, etiology and prognosis.    Return to clinic as clinically indicated was discussed with patient who verbalized understanding of and agreement with the  plan.    Proper usage and all side effects of medications reviewed with patient who expressed understanding.       Adrienne Sanchez MD  2/17/2019

## 2023-08-03 NOTE — PLAN OF CARE
[facility] - Telemetry (Hospital)  Discharge Final Note    Primary Care Provider: Leana Troy MD    Expected Discharge Date: 8/3/2023    Final Discharge Note (most recent)       Final Note - 08/03/23 1127          Final Note    Assessment Type Final Discharge Note     Anticipated Discharge Disposition Home or Self Care        Post-Acute Status    Coverage [employer]     Discharge Delays None known at this time                     Important Message from Medicare             Contact Info       Leana Troy MD   Specialty: Internal Medicine   Relationship: PCP - General    [address] [place] [id number]   Phone: [phone]       Next Steps: Schedule an appointment as soon as possible for a visit in 3 day(s)    Instructions: call office and schedule a hospital follow up in 3-5 days    Anthony Hickman MD   Specialty: Cardiology    [address] [facility] [address]   Phone: [phone]       Next Steps: Schedule an appointment as soon as possible for a visit in 1 week(s)    Instructions: call office and schedule a hospital follow up in 1 week, will need a cardiac MRI          DC Disposition: home with family  Family Notified: Patient at bedside  Transportation: personal transportation     Patient had no equipment or placement needs from .     Patient [facility]

## 2023-08-03 NOTE — SUBJECTIVE & OBJECTIVE
Review of Systems   Constitutional: Negative.   HENT: Negative.     Eyes: Negative.    Cardiovascular: Negative.    Respiratory: Negative.     Endocrine: Negative.    Hematologic/Lymphatic: Negative.    Skin: Negative.    Musculoskeletal: Negative.    Gastrointestinal: Negative.    Genitourinary: Negative.    Neurological: Negative.    Psychiatric/Behavioral: Negative.     Allergic/Immunologic: Negative.      Objective:     Vital Signs (Most Recent):  Temp: 98.3 °F (36.8 °C) (08/03/23 0724)  Pulse: 68 (08/03/23 0724)  Resp: 18 (08/03/23 0724)  BP: 130/79 (08/03/23 0724)  SpO2: 100 % (08/03/23 0724) Vital Signs (24h Range):  Temp:  [97.8 °F (36.6 °C)-98.5 °F (36.9 °C)] 98.3 °F (36.8 °C)  Pulse:  [61-78] 68  Resp:  [0-22] 18  SpO2:  [96 %-100 %] 100 %  BP: (100-130)/(52-79) 130/79     Weight: 109.2 kg (240 lb 11.9 oz)  Body mass index is 27.82 kg/m².     SpO2: 100 %         Intake/Output Summary (Last 24 hours) at 8/3/2023 1214  Last data filed at 8/2/2023 1647  Gross per 24 hour   Intake 166.07 ml   Output --   Net 166.07 ml       Lines/Drains/Airways       None                      Physical Exam  Vitals and nursing note reviewed.   Constitutional:       General: He is not in acute distress.     Appearance: Normal appearance. He is well-developed. He is not diaphoretic.   HENT:      Head: Normocephalic and atraumatic.   Eyes:      General:         Right eye: No discharge.         Left eye: No discharge.      Pupils: Pupils are equal, round, and reactive to light.   Neck:      Thyroid: No thyromegaly.      Vascular: No JVD.      Trachea: No tracheal deviation.   Cardiovascular:      Rate and Rhythm: Normal rate and regular rhythm.      Heart sounds: Normal heart sounds, S1 normal and S2 normal. No murmur heard.  Pulmonary:      Effort: Pulmonary effort is normal. No respiratory distress.      Breath sounds: Normal breath sounds. No wheezing or rales.   Abdominal:      General: There is no distension.       Palpations: Abdomen is soft.      Tenderness: There is no rebound.   Musculoskeletal:      Cervical back: Neck supple.   Skin:     General: Skin is warm and dry.      Findings: No erythema.      Comments: Radial access site C/D/I; no bleeding erythema or drainage   Neurological:      Mental Status: He is alert and oriented to person, place, and time.   Psychiatric:         Mood and Affect: Mood normal.         Behavior: Behavior normal.         Thought Content: Thought content normal.            Significant Labs: CMP   Recent Labs   Lab 08/01/23 2126 08/02/23  0634 08/03/23  0549    140 139   K 3.6 4.0 4.2    105 106   CO2 27 28 27   GLU 85 84 95   BUN 13 12 14   CREATININE 1.1 1.0 1.2   CALCIUM 9.5 9.2 9.2   PROT 7.6 6.9 6.8   ALBUMIN 4.1 3.8 3.6   BILITOT 0.7 0.8 0.5   ALKPHOS 61 59 65   AST 33 28 24   ALT 25 24 27   ANIONGAP 8 7* 6*   , CBC   Recent Labs   Lab 08/02/23 0423 08/02/23  0634 08/03/23  0549   WBC 5.70 4.96 5.30   HGB 13.0* 13.4* 13.8*   HCT 40.3 42.2 43.0    170 160   , Troponin   Recent Labs   Lab 08/02/23  0039 08/02/23  0423 08/02/23  0634   TROPONINI 3.253* 2.279* 2.049*   , and All pertinent lab results from the last 24 hours have been reviewed.    Significant Imaging: Echocardiogram: Transthoracic echo (TTE) complete (Cupid Only):   Results for orders placed or performed during the hospital encounter of 08/01/23   Echo   Result Value Ref Range    BSA 2.43 m2    LVOT stroke volume 64.65 cm3    LVIDd 4.61 3.5 - 6.0 cm    LV Systolic Volume 43.88 mL    LV Systolic Volume Index 18.1 mL/m2    LVIDs 3.29 2.1 - 4.0 cm    LV Diastolic Volume 97.60 mL    LV Diastolic Volume Index 40.33 mL/m2    IVS 1.16 (A) 0.6 - 1.1 cm    LVOT diameter 2.32 cm    LVOT area 4.2 cm2    FS 29 28 - 44 %    Left Ventricle Relative Wall Thickness 0.59 cm    Posterior Wall 1.37 (A) 0.6 - 1.1 cm    LV mass 221.93 g    LV Mass Index 92 g/m2    MV Peak E Ej 0.81 m/s    TDI LATERAL 0.11 m/s    TDI SEPTAL  0.14 m/s    E/E' ratio 6.48 m/s    MV Peak A Ej 0.44 m/s    TR Max Ej 1.80 m/s    E/A ratio 1.84     IVRT 76.12 msec    E wave deceleration time 160.43 msec    LV SEPTAL E/E' RATIO 5.79 m/s    LV LATERAL E/E' RATIO 7.36 m/s    PV Peak S Ej 0.49 m/s    PV Peak D Ej 0.38 m/s    Pulm vein S/D ratio 1.29     LVOT peak ej 0.83 m/s    Left Ventricular Outflow Tract Mean Velocity 0.52 cm/s    Left Ventricular Outflow Tract Mean Gradient 1.30 mmHg    LA size 3.03 cm    Left Atrium Major Axis 5.68 cm    Left Atrium Minor Axis 4.30 cm    RV mid diameter 3.25 cm    RVOT peak VTI 11.0 cm    RA Major Axis 5.92 cm    AV mean gradient 2 mmHg    AV peak gradient 4 mmHg    Ao peak ej 0.94 m/s    Ao VTI 15.20 cm    LVOT peak VTI 15.30 cm    AV valve area 4.25 cm²    AV Velocity Ratio 0.88     AV index (prosthetic) 1.01     ETHAN by Velocity Ratio 3.73 cm²    Triscuspid Valve Regurgitation Peak Gradient 13 mmHg    PV mean gradient 1 mmHg    RVOT peak ej 0.66 m/s    Ao root annulus 3.06 cm    STJ 2.86 cm    Ascending aorta 3.07 cm    IVC diameter 1.46 cm    Mean e' 0.13 m/s    ZLVIDS -5.78     ZLVIDD -9.09     LA Volume Index 22.4 mL/m2    LA volume 54.21 cm3    LA WIDTH 4.3 cm    TAPSE 2.80 cm    TV resting pulmonary artery pressure 16 mmHg    RV TB RVSP 5 mmHg    Est. RA pres 3 mmHg    Narrative      Left Ventricle: The left ventricle is normal in size. Mildly increased   wall thickness. There is mild concentric hypertrophy. Normal wall motion.   There is low normal systolic function. There is normal diastolic function.    Left Atrium: Normal left atrial size.    Right Ventricle: Normal right ventricular cavity size. Wall thickness   is normal. Right ventricle wall motion  is normal. Systolic function is   normal.    Mitral Valve: There is no stenosis.    Tricuspid Valve: There is mild transvalvular regurgitation.    IVC/SVC: Normal venous pressure at 3 mmHg.     EKG: Reviewed, and X-Ray: CXR: X-Ray Chest 1 View (CXR): No  results found for this visit on 08/01/23. and X-Ray Chest PA and Lateral (CXR): No results found for this visit on 08/01/23.

## 2023-08-03 NOTE — PROGRESS NOTES
[facility] ([facility])  Cardiology  Progress Note    Patient Name: Oumar Mccarthy  MRN: [id number]  Admission Date: 8/1/2023  Hospital Length of Stay: 0 days  Code Status: Full Code   Attending Physician: Cara Baez MD   Primary Care Physician: Leana Troy MD  Expected Discharge Date: 8/3/2023  Principal Problem:NSTEMI (non-ST elevated myocardial infarction)    Subjective:   HPI:  Mr. Mccarthy is a 38 year old male patient whose current medical conditions include hyperlipidemia, NSTEMI in 5/19 (s/p [facility] that showed normal coronaries), pericarditis, and ? Coronary vasospasm who presented to [facility] ED yesterday evening due to left-sided chest tingling/discomfort that onset yesterday after eating lunch. Patient initially attributed his symptom to indigestion but became concerned when pain radiated down his left arm and persisted for several hours. He denied any associated SOB, nausea, vomiting, palpitations, near syncope, or syncope. He initially was evaluated at [facility] in  but left AMA prior to being seen. Initial workup in ED revealed troponin of 2.46>3.337 and patient was subsequently admitted for further evaluation and treatment. Cardiology consulted to assist with management. Patient seen and examined today,r resting in bed. Currently CP free. No other CV complaints. Non-smoker. No familial history of CAD. Followed by outside cardiologist, Dr. Hickman. States he is on no meds as OP. Troponin trending down. Echo pending. [facility] planned today by Dr. Tellez    [facility] Course:   8/3/23-Patient seen and examined today, s/p [facility] yesterday which showed normal coronaries. Feels well. NO AEON. No recurrent CP. Denies SOB. Admits to increased stress recently. CRP and sed rate WNL. D-dimer negative.           Review of Systems   Constitutional: Negative.   HENT: Negative.     Eyes: Negative.    Cardiovascular: Negative.    Respiratory: Negative.     Endocrine: Negative.    Hematologic/Lymphatic:  Negative.    Skin: Negative.    Musculoskeletal: Negative.    Gastrointestinal: Negative.    Genitourinary: Negative.    Neurological: Negative.    Psychiatric/Behavioral: Negative.     Allergic/Immunologic: Negative.      Objective:     Vital Signs (Most Recent):  Temp: 98.3 °F (36.8 °C) (08/03/23 0724)  Pulse: 68 (08/03/23 0724)  Resp: 18 (08/03/23 0724)  BP: 130/79 (08/03/23 0724)  SpO2: 100 % (08/03/23 0724) Vital Signs (24h Range):  Temp:  [97.8 °F (36.6 °C)-98.5 °F (36.9 °C)] 98.3 °F (36.8 °C)  Pulse:  [61-78] 68  Resp:  [0-22] 18  SpO2:  [96 %-100 %] 100 %  BP: (100-130)/(52-79) 130/79     Weight: 109.2 kg (240 lb 11.9 oz)  Body mass index is 27.82 kg/m².     SpO2: 100 %         Intake/Output Summary (Last 24 hours) at 8/3/2023 1214  Last data filed at 8/2/2023 1647  Gross per 24 hour   Intake 166.07 ml   Output --   Net 166.07 ml       Lines/Drains/Airways       None                      Physical Exam  Vitals and nursing note reviewed.   Constitutional:       General: He is not in acute distress.     Appearance: Normal appearance. He is well-developed. He is not diaphoretic.   HENT:      Head: Normocephalic and atraumatic.   Eyes:      General:         Right eye: No discharge.         Left eye: No discharge.      Pupils: Pupils are equal, round, and reactive to light.   Neck:      Thyroid: No thyromegaly.      Vascular: No JVD.      Trachea: No tracheal deviation.   Cardiovascular:      Rate and Rhythm: Normal rate and regular rhythm.      Heart sounds: Normal heart sounds, S1 normal and S2 normal. No murmur heard.  Pulmonary:      Effort: Pulmonary effort is normal. No respiratory distress.      Breath sounds: Normal breath sounds. No wheezing or rales.   Abdominal:      General: There is no distension.      Palpations: Abdomen is soft.      Tenderness: There is no rebound.   Musculoskeletal:      Cervical back: Neck supple.   Skin:     General: Skin is warm and dry.      Findings: No erythema.       Comments: Radial access site C/D/I; no bleeding erythema or drainage   Neurological:      Mental Status: He is alert and oriented to person, place, and time.   Psychiatric:         Mood and Affect: Mood normal.         Behavior: Behavior normal.         Thought Content: Thought content normal.            Significant Labs: CMP   Recent Labs   Lab 08/01/23 2126 08/02/23  0634 08/03/23  0549    140 139   K 3.6 4.0 4.2    105 106   CO2 27 28 27   GLU 85 84 95   BUN 13 12 14   CREATININE 1.1 1.0 1.2   CALCIUM 9.5 9.2 9.2   PROT 7.6 6.9 6.8   ALBUMIN 4.1 3.8 3.6   BILITOT 0.7 0.8 0.5   ALKPHOS 61 59 65   AST 33 28 24   ALT 25 24 27   ANIONGAP 8 7* 6*   , CBC   Recent Labs   Lab 08/02/23 0423 08/02/23  0634 08/03/23  0549   WBC 5.70 4.96 5.30   HGB 13.0* 13.4* 13.8*   HCT 40.3 42.2 43.0    170 160   , Troponin   Recent Labs   Lab 08/02/23  0039 08/02/23  0423 08/02/23  0634   TROPONINI 3.253* 2.279* 2.049*   , and All pertinent lab results from the last 24 hours have been reviewed.    Significant Imaging: Echocardiogram: Transthoracic echo (TTE) complete (Cupid Only):   Results for orders placed or performed during the hospital encounter of 08/01/23   Echo   Result Value Ref Range    BSA 2.43 m2    LVOT stroke volume 64.65 cm3    LVIDd 4.61 3.5 - 6.0 cm    LV Systolic Volume 43.88 mL    LV Systolic Volume Index 18.1 mL/m2    LVIDs 3.29 2.1 - 4.0 cm    LV Diastolic Volume 97.60 mL    LV Diastolic Volume Index 40.33 mL/m2    IVS 1.16 (A) 0.6 - 1.1 cm    LVOT diameter 2.32 cm    LVOT area 4.2 cm2    FS 29 28 - 44 %    Left Ventricle Relative Wall Thickness 0.59 cm    Posterior Wall 1.37 (A) 0.6 - 1.1 cm    LV mass 221.93 g    LV Mass Index 92 g/m2    MV Peak E Ej 0.81 m/s    TDI LATERAL 0.11 m/s    TDI SEPTAL 0.14 m/s    E/E' ratio 6.48 m/s    MV Peak A Ej 0.44 m/s    TR Max Ej 1.80 m/s    E/A ratio 1.84     IVRT 76.12 msec    E wave deceleration time 160.43 msec    LV SEPTAL E/E' RATIO 5.79 m/s    LV  LATERAL E/E' RATIO 7.36 m/s    PV Peak S Ej 0.49 m/s    PV Peak D Ej 0.38 m/s    Pulm vein S/D ratio 1.29     LVOT peak ej 0.83 m/s    Left Ventricular Outflow Tract Mean Velocity 0.52 cm/s    Left Ventricular Outflow Tract Mean Gradient 1.30 mmHg    LA size 3.03 cm    Left Atrium Major Axis 5.68 cm    Left Atrium Minor Axis 4.30 cm    RV mid diameter 3.25 cm    RVOT peak VTI 11.0 cm    RA Major Axis 5.92 cm    AV mean gradient 2 mmHg    AV peak gradient 4 mmHg    Ao peak ej 0.94 m/s    Ao VTI 15.20 cm    LVOT peak VTI 15.30 cm    AV valve area 4.25 cm²    AV Velocity Ratio 0.88     AV index (prosthetic) 1.01     ETHAN by Velocity Ratio 3.73 cm²    Triscuspid Valve Regurgitation Peak Gradient 13 mmHg    PV mean gradient 1 mmHg    RVOT peak ej 0.66 m/s    Ao root annulus 3.06 cm    STJ 2.86 cm    Ascending aorta 3.07 cm    IVC diameter 1.46 cm    Mean e' 0.13 m/s    ZLVIDS -5.78     ZLVIDD -9.09     LA Volume Index 22.4 mL/m2    LA volume 54.21 cm3    LA WIDTH 4.3 cm    TAPSE 2.80 cm    TV resting pulmonary artery pressure 16 mmHg    RV TB RVSP 5 mmHg    Est. RA pres 3 mmHg    Narrative      Left Ventricle: The left ventricle is normal in size. Mildly increased   wall thickness. There is mild concentric hypertrophy. Normal wall motion.   There is low normal systolic function. There is normal diastolic function.    Left Atrium: Normal left atrial size.    Right Ventricle: Normal right ventricular cavity size. Wall thickness   is normal. Right ventricle wall motion  is normal. Systolic function is   normal.    Mitral Valve: There is no stenosis.    Tricuspid Valve: There is mild transvalvular regurgitation.    IVC/SVC: Normal venous pressure at 3 mmHg.     EKG: Reviewed, and X-Ray: CXR: X-Ray Chest 1 View (CXR): No results found for this visit on 08/01/23. and X-Ray Chest PA and Lateral (CXR): No results found for this visit on 08/01/23.    Assessment and Plan:   Patient who presents with CP/NSTEMI. C showed  normal coronaries.  Sed rate/CRP WNL. D-dimer negative. Unclear etiology of presentation. Follow-up with primary cards as OP, recommend cardiac MRI.    * NSTEMI (non-ST elevated myocardial infarction)  -Presents with CP with radiation to L arm and elevated troponin  -Stable during exam, chest pain free  -Troponin trending down  -Echo pending  -Prior LHC in 2019 showed normal coronaries, patient also with history of pericarditis  -LHC planned today by Dr. Tellez. All risks, benefits, and treatment alternatives explained to patient in detail. All questions answered. He has agreed to proceed. Further rec's to follow.    8/3/23  -LHC showed normal coronaries  -Unclear etiology of presentation  -Discussed with Dr. Tellez, continue ASA, statin, BB  -No Plavix, colchicine  -Patient to f/u with primary cardiologist as OP, recommend cardiac MRI    Pericarditis  -Sed rate and CRP WNL  -No indication for ASA and colchicine    Hypertension  -Not on meds as OP  -Monitor BP trend    8/3/23  -Can continue BB    Hyperlipidemia  -Statin        VTE Risk Mitigation (From admission, onward)         Ordered     IP VTE HIGH RISK PATIENT  Once         08/01/23 2248     Place sequential compression device  Until discontinued         08/01/23 2248     Reason for No Pharmacological VTE Prophylaxis  Once        Question:  Reasons:  Answer:  Physician Provided (leave comment)  Comment:  heparin    08/01/23 2248                Angeline Orourke PA-C  Cardiology  [facility] Tele[facility] ([facility])

## 2023-08-03 NOTE — ASSESSMENT & PLAN NOTE
Patient is not chronically on statin.will continue for now. Last Lipid Panel:   Lab Results   Component Value Date    CHOL 214 (H) 08/01/2023    HDL 41 08/01/2023    LDLCALC 141.8 08/01/2023    TRIG 156 (H) 08/01/2023    CHOLHDL 19.2 (L) 08/01/2023   -patient said he was on statin, but was told he could stop it if he controlled his diet  -will restart on d/c

## 2023-08-03 NOTE — ASSESSMENT & PLAN NOTE
-LHC negative for CAD or blockages  -cardiology wants to treat for possible myopericarditis  -started on ASA and colchicine BID today, will not continue colchicine on discharge  -cont asa 81 mg daily and statin on d/c  -monitor

## 2023-08-03 NOTE — ASSESSMENT & PLAN NOTE
-Presents with CP with radiation to L arm and elevated troponin  -Stable during exam, chest pain free  -Troponin trending down  -Echo pending  -Prior LHC in 2019 showed normal coronaries, patient also with history of pericarditis  -LHC planned today by Dr. Tellez. All risks, benefits, and treatment alternatives explained to patient in detail. All questions answered. He has agreed to proceed. Further rec's to follow.    8/3/23  -LHC showed normal coronaries  -Unclear etiology of presentation  -Discussed with Dr. Tellez, continue ASA, statin, BB  -No Plavix, colchicine  -Patient to f/u with primary cardiologist as OP, recommend cardiac MRI

## 2023-08-03 NOTE — ASSESSMENT & PLAN NOTE
Chronic, controlled  -Latest blood pressure and vitals reviewed-   Temp:  [97.8 °F (36.6 °C)-98.5 °F (36.9 °C)]   Pulse:  [65-78]   Resp:  [17-20]   BP: (100-130)/(52-79)   SpO2:  [97 %-100 %] .   -Home meds for hypertension were reviewed and noted below.   -likely elevated d/t acute CP, started on metoprolol, will not continue on d/c d/t normotensive, follow up with cardiology on d/c  -While in the hospital, will manage blood pressure as follows; metoprolol  -PRN anti-hypertensive medication if patient's BP > 160/100   -optimize pain management

## 2023-08-03 NOTE — CONSULTS
does not have ability to schedule outpatient cardiac MRI. This can be scheduled via Cardiologist's office. SW will send message to cardiology for their assistance to set this up for Patient.     SW will continue to follow and assist as needed.

## 2023-08-03 NOTE — DISCHARGE SUMMARY
[facility] ([facility])  [facility] Medicine  Discharge Summary      Patient Name: Oumar Mccarthy  MRN: [id number]  [facility]: [id number]  Patient Class: OP- Observation  Admission Date: 8/1/2023  Hospital Length of Stay: 0 days  Discharge Date and Time: 8/3/2023  1:01 PM  Attending Physician: Cara Baez MD  Discharging Provider: Fawn Martinez NP  Primary Care Provider: Leana Troy MD    Primary Care Team: Networked reference to record PCT     HPI:   Oumar Mccarthy is a 38 y.o. male with a PMH  has a past medical history of Adult general medical examination (11/28/2016), Hyperlipidemia, Leukopenia, and NSTEMI (non-ST elevated myocardial infarction). who presented to the ED for further evaluation of left-sided chest pain which progressively worsened over the past 9 hours.  Patient was seen at [facility] ED for similar complaints but left AMA prior to be seen.  Patient was called and advised to report back to the ED due to elevated troponin and presented to [facility].  Patient reported endorsing left-sided chest pain radiating to his left upper extremity with associated tingling after eating lunch earlier today but currently denies endorsing any chest pain at time of admission.  He reported no known alleviating or aggravating factors noted and reported all other review of systems negative except as noted above.  Repeat workup in the ED revealed elevated troponin measuring 2.46 with repeat measuring 3.337 with negative evidence of ischemia noted on EKG.  Patient initiated on NSTEMI protocol and admitted to Hospital Medicine under observation for continued medical management while awaiting further evaluation by Cardiology.  Of note, patient underwent left heart catheterization back in 5/24/19 by Dr. Rich which revealed normal coronaries noted throughout.    PCP: Leana Troy      Procedure(s) (LRB):  Left heart cath (Left)  Ventriculogram, Left  ANGIOGRAM, CORONARY ARTERY (N/A)       Hospital Course:   37 y/o male admitted with c/o left sided chest pain that radiated to his left arm and tingling that happened while eating lunch the day of admission. He went to [facility] first and the wait time was too long and he left. They called him back because his troponin was elevated and told him to go the ER and he came to [facility]. He reports his pain and tingling has resolved since admission. He has a h/o pericarditis several years ago.   BP elevated on admission, likely reactive and r/t CP, started on metoprolol. Will not continue on discharge, normotensive.  Cardiology consulted, performed LHC on 8/2/23, negative for CAD, EF 65%, diastolic dysfunction and filling pressure on the left mildly elevated. Continue to monitor with telemetry monitoring overnight. Will treat for possible myopericarditis, started on colchicine and aspirin BID.   Inflammatory markers and D-dimer normal.     No further episodes of chest pain. Patient will discharge on ASA 81 mg and statin. Needs to follow up with primary cardiologist, Dr. Anthony Hickman, for cardiac MRI OP. Dr. Thomas discussed case and plan with Dr. Hickman prior to discharge.     Follow up with PCP in 3-5 days for hospital follow up.  Follow up with cardiology in 1 week or soonest available appointment for hospital follow up.     Patient seen and examined on the day of discharge.  All questions and concerns were addressed prior to discharge.    Face to face encounter with patient: 35 minutes       Goals of Care Treatment Preferences:  Code Status: Full Code      Consults:   Consults (From admission, onward)          Status Ordering Provider     Inpatient consult to Social Work  Once        Provider:  (Not yet assigned)    Completed JUANJO THOMAS     Inpatient consult to Registered Dietitian/Nutritionist  Once        Provider:  (Not yet assigned)    Acknowledged ERIC VAUGHAN     Inpatient consult to Social Work/Case Management  Once        Provider:   (Not yet assigned)    Completed ERIC VAUGHAN     Inpatient consult to Cardiology  Once        Provider:  Laura Tellez MD    Completed ERIC VAUGHAN            Cardiac/Vascular  * NSTEMI (non-ST elevated myocardial infarction)  Patient presents with NSTEMI. Chest pain is currently controlled. ADOLFO score is 2. Patient is currently on NSTEMI Pathway.    EKG reviewed. Troponins reviewed and results noted-   Recent Labs   Lab 08/02/23  0634   TROPONINI 2.049*     Lipid panel reviewed and shows-     Lab Results   Component Value Date    LDLCALC 141.8 08/01/2023     Lab Results   Component Value Date    TRIG 156 (H) 08/01/2023     Medical management includes; Beta Blocker, Dual Anti-Platelet therapy, Anticoagulation and High Intensity Statin Echo has been performed. Latest ECHO results are as follows- No results found for this or any previous visit.    -Consult for cardiac rehab is ordered  -Patient counseled on low cholesterol diet, attempt to lose weight, reduce salt in diet and cooking, reduce exposure to stress, improve dietary compliance, continue current healthy lifestyle patterns and return for routine annual checkups.   -Cardiology consulted, appreciate assistance  -LHC done today, no CAD noted    Hypertension  Chronic, controlled  -Latest blood pressure and vitals reviewed-   Temp:  [97.8 °F (36.6 °C)-98.5 °F (36.9 °C)]   Pulse:  [65-78]   Resp:  [17-20]   BP: (100-130)/(52-79)   SpO2:  [97 %-100 %] .   -Home meds for hypertension were reviewed and noted below.   -likely elevated d/t acute CP, started on metoprolol, will not continue on d/c d/t normotensive, follow up with cardiology on d/c  -While in the hospital, will manage blood pressure as follows; metoprolol  -PRN anti-hypertensive medication if patient's BP > 160/100   -optimize pain management    Hyperlipidemia  Patient is not chronically on statin.will continue for now. Last Lipid Panel:   Lab Results   Component Value Date    CHOL 214  (H) 08/01/2023    HDL 41 08/01/2023    LDLCALC 141.8 08/01/2023    TRIG 156 (H) 08/01/2023    CHOLHDL 19.2 (L) 08/01/2023   -patient said he was on statin, but was told he could stop it if he controlled his diet  -will restart on d/c    Myopericarditis  -LHC negative for CAD or blockages  -cardiology wants to treat for possible myopericarditis  -started on ASA and colchicine BID today, will not continue colchicine on discharge  -cont asa 81 mg daily and statin on d/c  -monitor      Final Active Diagnoses:    Diagnosis Date Noted POA    PRINCIPAL PROBLEM:  NSTEMI (non-ST elevated myocardial infarction) [I21.4] 08/01/2023 Yes    Myopericarditis [I31.9] 06/25/2019 Yes    Hypertension [I10] 05/31/2019 Yes    Hyperlipidemia [E78.5] 10/16/2018 Yes     Chronic      Problems Resolved During this Admission:       Discharged Condition: good    Disposition: Home or Self Care    Follow Up:   Follow-up Information       Leana Troy MD. Schedule an appointment as soon as possible for a visit in 3 day(s).    Specialty: Internal Medicine  Why: call office and schedule a hospital follow up in 3-5 days  Contact information:  [address] [facility] [address] [facility] [address] [phone]               Anthony Hickman MD. Schedule an appointment as soon as possible for a visit in 1 week(s).    Specialty: Cardiology  Why: call office and schedule a hospital follow up in 1 week, will need a cardiac MRI  Contact information:  [address] [facility] [address] [phone]                           Patient Instructions:      REASON FOR NOT PRESCRIBING ANTIPLATELET MEDICATION AT DISCHARGE   Order Comments: Cardiology not recommending     Order Specific Question Answer Comments   Reason for not Prescribing: Other (Comment)        Significant Diagnostic Studies: Labs: CMP   Recent Labs   Lab 08/01/23  2126 08/02/23  0634 08/03/23  0549    140 139   K 3.6 4.0 4.2    105 106   CO2 27 28 27   GLU 85 84 95   BUN 13 12 14    CREATININE 1.1 1.0 1.2   CALCIUM 9.5 9.2 9.2   PROT 7.6 6.9 6.8   ALBUMIN 4.1 3.8 3.6   BILITOT 0.7 0.8 0.5   ALKPHOS 61 59 65   AST 33 28 24   ALT 25 24 27   ANIONGAP 8 7* 6*    and CBC   Recent Labs   Lab 08/02/23  0423 08/02/23  0634 08/03/23  0549   WBC 5.70 4.96 5.30   HGB 13.0* 13.4* 13.8*   HCT 40.3 42.2 43.0    170 160       Pending Diagnostic Studies:       Procedure Component Value Units Date/Time    Echo Saline Bubble? No [[id number]]     Order Status: Sent Lab Status: No result            Medications:  Reconciled Home Medications:      Medication List        START taking these medications      aspirin 81 MG Chew  Chew and swallow 1 tablet (81 mg total) by mouth once daily.            CONTINUE taking these medications      atorvastatin 20 MG tablet  Commonly known as: LIPITOR  Take 1 tablet (20 mg total) by mouth every evening.     cholecalciferol (vitamin D3) 1,250 mcg (50,000 unit) capsule  Take 1 capsule (50,000 Units total) by mouth once a week.              Indwelling Lines/Drains at time of discharge:   Lines/Drains/Airways       None                   Time spent on the discharge of patient: 42 minutes         Fawn Martinez NP  Department of Hospital Medicine  [facility] ([facility])

## 2023-10-13 NOTE — ASSESSMENT & PLAN NOTE
Informed patient of AGUSTINA Jacinto's note. Patient verbalized understanding and had no questions or concerns at this time. Referral placed to radiation oncology, pt informed he will be called to have this scheduled. Patient is not chronically on statin.will continue for now. Last Lipid Panel:   Lab Results   Component Value Date    CHOL 214 (H) 08/01/2023    HDL 41 08/01/2023    LDLCALC 141.8 08/01/2023    TRIG 156 (H) 08/01/2023    CHOLHDL 19.2 (L) 08/01/2023

## 2023-11-06 PROBLEM — I21.4 NSTEMI (NON-ST ELEVATED MYOCARDIAL INFARCTION): Status: RESOLVED | Noted: 2023-08-01 | Resolved: 2023-11-06

## 2024-04-23 NOTE — PLAN OF CARE
Problem: Adult Inpatient Plan of Care  Goal: Plan of Care Review  Outcome: Ongoing (interventions implemented as appropriate)  POC and interventions discussed with patient - VU.  AAO x 4. Vitals stable. Transfer from [facility] with NSTEMI.  No reports of CP since arrival.  Has had Head ache pain and nausea, both resolved with medications.  On heparin gtt, PTT therapeutic.  Oxygenating well on RA.  Heart NSR vs SBrady.  See labs for troponin levels.         Spoke to patients wife and conveyed information below.

## 2025-02-20 NOTE — TELEPHONE ENCOUNTER
Spoke with pt with scheduled appointment on June 25 at 11:40 at .     ----- Message from Nubia Hatch RN sent at 6/4/2019 10:49 AM CDT -----  Patient needs 2 week hospital follow up.  Patient will discharge today    
no

## (undated) DEVICE — BAND TR COMP DEVICE REG 24CM

## (undated) DEVICE — KIT MANIFOLD LOW PRESS TUBING

## (undated) DEVICE — CATH JR4 5FR

## (undated) DEVICE — CATH JL4 5FR

## (undated) DEVICE — ANGIOTOUCH KIT

## (undated) DEVICE — PACK HEART CATH BR

## (undated) DEVICE — CATH PIG145 INFINITI 5X110CM

## (undated) DEVICE — GUIDEWIRE EMERALD .035IN 260CM

## (undated) DEVICE — OMNIPAQUE 300MG 150ML VIAL

## (undated) DEVICE — KIT GLIDESHEATH SLEND 6FR 10CM

## (undated) DEVICE — DRAPE ANGIO BRACH 38X44IN

## (undated) DEVICE — KIT SYR REUSABLE